# Patient Record
Sex: FEMALE | Race: WHITE | NOT HISPANIC OR LATINO | Employment: PART TIME | URBAN - NONMETROPOLITAN AREA
[De-identification: names, ages, dates, MRNs, and addresses within clinical notes are randomized per-mention and may not be internally consistent; named-entity substitution may affect disease eponyms.]

---
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
## 2020-01-02 ENCOUNTER — OFFICE VISIT (OUTPATIENT)
Dept: ENDOCRINOLOGY | Facility: CLINIC | Age: 20
End: 2020-01-02

## 2020-01-02 VITALS
HEIGHT: 68 IN | OXYGEN SATURATION: 99 % | WEIGHT: 186.4 LBS | HEART RATE: 107 BPM | BODY MASS INDEX: 28.25 KG/M2 | SYSTOLIC BLOOD PRESSURE: 120 MMHG | DIASTOLIC BLOOD PRESSURE: 78 MMHG

## 2020-01-02 DIAGNOSIS — E10.65 TYPE 1 DIABETES MELLITUS WITH HYPERGLYCEMIA (HCC): Primary | ICD-10-CM

## 2020-01-02 DIAGNOSIS — E55.9 VITAMIN D DEFICIENCY: ICD-10-CM

## 2020-01-02 PROCEDURE — 99214 OFFICE O/P EST MOD 30 MIN: CPT | Performed by: NURSE PRACTITIONER

## 2020-01-02 NOTE — PROGRESS NOTES
Subjective    Anna García is a 19 y.o. female. she is here today for follow-up.    History of Present Illness       History of Present Illness     Reason -  Diabetes      Duration/Timing:  Diabetes mellitus type 1, Age at onset of diabetes: 8 years  constant     not controlled                 Severity (Complications/Hospitalizations)  Secondary Macrovascular Complications:  No CAD, No CVA, No PAD  Secondary Microvascular Complications:  No Diabetic Nephropathy, No proteinuria, No Diabetic Retinopathy, No Diabetic Neuropathy     Context  Diabetes Regimen:  Insulin,       Lab Results   Component Value Date    HGBA1C 8.8 (H) 10/10/2019                            Blood Glucose Readings        Variable from 60 up to 300               Has had some low sugars before bed when missing super      Exercise:  Does not exercise     Associated Signs/Symptoms  Hyperglycemic Symptoms:  Positive  polyuria, No polydipsia, No polyphagia  Hypoglycemic Episodes:  + documented hypoglycemia                The following portions of the patient's history were reviewed and updated as appropriate:   Past Medical History:   Diagnosis Date   • Abdominal pain    • Acute bronchitis    • Acute pharyngitis    • Allergic rhinitis    • Asteatotic eczema    • Carbuncle of buttock    • Chalazion     - right upper and lower eyelids   • Conjunctivitis    • Constipation    • Contact dermatitis    • Diabetic ketoacidosis (CMS/HCC)     - history of   • Diarrhea    • DKA (diabetic ketoacidoses) (CMS/HCC)    • Esotropia    • Fatigue    • Folliculitis    • Hordeolum internum of right lower eyelid    • Influenza    • Laceration of skin of chin    • Nausea and vomiting    • Otitis media    • Tibial torsion      - left leg   • Type 1 diabetes mellitus (CMS/HCC)    • Vitamin D deficiency      Past Surgical History:   Procedure Laterality Date   • CRYOTHERAPY  10/13/2010    acne   • OTHER SURGICAL HISTORY  05/04/2011    Remove Impacted Cerumen 89574       Family History   Problem Relation Age of Onset   • Breast cancer Maternal Grandmother    • Hypertension Mother    • Asthma Sister    • Heart disease Maternal Grandfather    • Colon cancer Neg Hx    • Endometrial cancer Neg Hx    • Ovarian cancer Neg Hx      OB History    None       Current Outpatient Medications   Medication Sig Dispense Refill   • albuterol (PROAIR RESPICLICK) 108 (90 Base) MCG/ACT inhaler Inhale 2 puffs Every 4 (Four) Hours As Needed for Wheezing or Shortness of Air. 1 inhaler 11   • Blood Glucose/Ketone Monitor device Use as indicated 50 each 11   • clotrimazole-betamethasone (LOTRISONE) 1-0.05 % cream Apply  topically 2 (Two) Times a Day As Needed (itching). 45 g 1   • fluconazole (DIFLUCAN) 150 MG tablet Take 1 tablet by mouth today and repeat in 4 days if symptoms still present. 2 tablet 6   • GLUCAGON EMERGENCY 1 MG injection USE AS DIRECTED IN THE SETTING OF HYPOGLYCEMIA (LESS THAN 70) AND NONRESPONSIVENESS, WHEN IT IS UNSAFE TO TAKE ORAL GLUCOSE 1 each 6   • glucose blood (ACCU-CHEK SONA PLUS) test strip 200 each by Other route 6 (Six) Times a Day. Use as instructed 200 each 11   • glucose blood test strip Testing 4 times daily, DX E10.65 120 each 11   • insulin lispro (humaLOG) 100 UNIT/ML injection Up to 30 units with meals. 1 unit per 6 grams and 1 unit per 30 above 140 27 mL 5   • Insulin Pen Needle (B-D UF III MINI PEN NEEDLES) 31G X 5 MM misc USE 4-6 TIMES PER  each 10   • ondansetron ODT (ZOFRAN-ODT) 4 MG disintegrating tablet Take 1 tablet by mouth Every 8 (Eight) Hours As Needed for Nausea or Vomiting. 30 tablet 11   • TILIA FE 1-20/1-30/1-35 MG-MCG tablet TAKE 1 TABLET BY MOUTH DAILY 84 tablet 4   • TOUJEO SOLOSTAR 300 UNIT/ML solution pen-injector INJECT 40 UNITS UNDER THE SKIN INTO THE APPROPRIATE AREA AS DIRECTED AT BEDTIME 4.5 mL 11   • Urine Glucose-Ketones Test (KETO-DIASTIX) strip Test if BG greater than 250 50 each 11     No current facility-administered  "medications for this visit.      Allergies   Allergen Reactions   • Cefprozil Hives     Social History     Socioeconomic History   • Marital status:      Spouse name: Not on file   • Number of children: Not on file   • Years of education: Not on file   • Highest education level: Not on file   Tobacco Use   • Smoking status: Never Smoker   • Smokeless tobacco: Never Used   Substance and Sexual Activity   • Alcohol use: No   • Drug use: No   • Sexual activity: Never       Review of Systems  Review of Systems   Constitutional: Negative for activity change, appetite change, diaphoresis and fatigue.   HENT: Negative for facial swelling, sneezing, sore throat, tinnitus, trouble swallowing and voice change.    Eyes: Negative for photophobia, pain, discharge, redness, itching and visual disturbance.   Respiratory: Negative for apnea, cough, choking, chest tightness and shortness of breath.    Cardiovascular: Negative for chest pain, palpitations and leg swelling.   Gastrointestinal: Negative for abdominal distention, abdominal pain, constipation, diarrhea, nausea and vomiting.   Endocrine: Negative for cold intolerance, heat intolerance, polydipsia, polyphagia and polyuria.   Genitourinary: Negative for difficulty urinating, dysuria, frequency, hematuria and urgency.   Musculoskeletal: Negative for arthralgias, back pain, gait problem, joint swelling, myalgias, neck pain and neck stiffness.   Skin: Negative for color change, pallor, rash and wound.   Neurological: Negative for dizziness, tremors, weakness, light-headedness, numbness and headaches.   Hematological: Negative for adenopathy. Does not bruise/bleed easily.   Psychiatric/Behavioral: Negative for behavioral problems, confusion and sleep disturbance.        Objective    /78   Pulse 107   Ht 172.7 cm (68\")   Wt 84.6 kg (186 lb 6.4 oz)   SpO2 99%   BMI 28.34 kg/m²   Physical Exam   Constitutional: She is oriented to person, place, and time. She " appears well-developed and well-nourished. No distress.   HENT:   Head: Normocephalic and atraumatic.   Right Ear: External ear normal.   Left Ear: External ear normal.   Nose: Nose normal.   Eyes: Pupils are equal, round, and reactive to light. Conjunctivae and EOM are normal.   Neck: Normal range of motion. Neck supple. No tracheal deviation present. No thyromegaly present.   Cardiovascular: Normal rate, regular rhythm and normal heart sounds.   No murmur heard.  Pulmonary/Chest: Effort normal and breath sounds normal. No respiratory distress. She has no wheezes.   Abdominal: Soft. Bowel sounds are normal. There is no tenderness. There is no rebound and no guarding.   Musculoskeletal: Normal range of motion. She exhibits no edema, tenderness or deformity.   Neurological: She is alert and oriented to person, place, and time. No cranial nerve deficit.   Skin: Skin is warm and dry. No rash noted.   Psychiatric: She has a normal mood and affect. Her behavior is normal. Judgment and thought content normal.       Lab Review  Glucose (mg/dL)   Date Value   09/05/2019 117 (H)   06/03/2019 280 (H)   05/25/2019 310 (H)     Sodium (mmol/L)   Date Value   09/05/2019 141   06/03/2019 139   05/25/2019 137     Potassium (mmol/L)   Date Value   09/05/2019 3.8   06/03/2019 4.5   05/25/2019 4.0     Chloride (mmol/L)   Date Value   09/05/2019 98   06/03/2019 102   05/25/2019 101     CO2 (mmol/L)   Date Value   09/05/2019 27.4   06/03/2019 25.2   05/25/2019 20.0 (L)     BUN (mg/dL)   Date Value   09/05/2019 11   06/03/2019 10   05/25/2019 11     Creatinine (mg/dL)   Date Value   09/05/2019 0.63   06/03/2019 0.54 (L)   05/25/2019 0.66     Hemoglobin A1C (%)   Date Value   10/10/2019 8.8 (H)   06/03/2019 8.80 (H)   03/20/2019 11.1 (H)   01/09/2019 11.5 (H)   11/21/2018 13.0 (H)     Triglycerides (mg/dl)   Date Value   01/14/2016 41     LDL Cholesterol  (mg/dl)   Date Value   01/14/2016 99       Assessment/Plan      1. Type 1 diabetes  mellitus with hyperglycemia (CMS/[facility])    2. Vitamin D deficiency    .    Medications prescribed:  Outpatient Encounter Medications as of 1/2/2020   Medication Sig Dispense Refill   • albuterol (PROAIR RESPICLICK) 108 (90 Base) MCG/ACT inhaler Inhale 2 puffs Every 4 (Four) Hours As Needed for Wheezing or Shortness of Air. 1 inhaler 11   • Blood Glucose/Ketone Monitor device Use as indicated 50 each 11   • clotrimazole-betamethasone (LOTRISONE) 1-0.05 % cream Apply  topically 2 (Two) Times a Day As Needed (itching). 45 g 1   • fluconazole (DIFLUCAN) 150 MG tablet Take 1 tablet by mouth today and repeat in 4 days if symptoms still present. 2 tablet 6   • GLUCAGON EMERGENCY 1 MG injection USE AS DIRECTED IN THE SETTING OF HYPOGLYCEMIA (LESS THAN 70) AND NONRESPONSIVENESS, WHEN IT IS UNSAFE TO TAKE ORAL GLUCOSE 1 each 6   • glucose blood (ACCU-CHEK SONA PLUS) test strip 200 each by Other route 6 (Six) Times a Day. Use as instructed 200 each 11   • glucose blood test strip Testing 4 times daily, DX E10.65 120 each 11   • insulin lispro (humaLOG) 100 UNIT/ML injection Up to 30 units with meals. 1 unit per 6 grams and 1 unit per 30 above 140 27 mL 5   • Insulin Pen Needle (B-D UF III MINI PEN NEEDLES) 31G X 5 MM misc USE 4-6 TIMES PER  each 10   • ondansetron ODT (ZOFRAN-ODT) 4 MG disintegrating tablet Take 1 tablet by mouth Every 8 (Eight) Hours As Needed for Nausea or Vomiting. 30 tablet 11   • TILIA FE 1-20/1-30/1-35 MG-MCG tablet TAKE 1 TABLET BY MOUTH DAILY 84 tablet 4   • TOUJEO SOLOSTAR 300 UNIT/ML solution pen-injector INJECT 40 UNITS UNDER THE SKIN INTO THE APPROPRIATE AREA AS DIRECTED AT BEDTIME 4.5 mL 11   • Urine Glucose-Ketones Test (KETO-DIASTIX) strip Test if BG greater than 250 50 each 11     No facility-administered encounter medications on file as of 1/2/2020.        Orders placed during this encounter include:  Orders Placed This Encounter   Procedures   • TSH   • Comprehensive Metabolic Panel   •  Hemoglobin A1c   • Vitamin D 25 Hydroxy   • CBC & Differential     Order Specific Question:   Manual Differential     Answer:   No     Glycemic Management:     Lab Results   Component Value Date    HGBA1C 8.8 (H) 10/10/2019             Presently off pump         Toujeo taking 36 units--        Humalog  using 1 per 4 carb      Sliding scale      1 unit for every 30 above 140         Approve for  Insulin pump and or Continuous Glucose Sensor      #1  Patient has diabetes mellitus, insulin-dependent.     #2 She performs blood glucose testing at least times daily and blood glucose log was brought to office with variability from .     #3  She is requiring  Basal insulin  and Prandial Insulin for a total of at least  4 injections per day and has been doing this for at least 6 months      #4 Patient tests blood sugars at least 4 times daily and makes frequent self-adjustments and patient is injecting insulin at least 4 times daily. She has been doing this for more than 6 months . She tests frequently due to hypoglycemia and hyperglycemia.      #5 I have personally seen patient within the past 6 months     #6 We plan on seeing her every 2-3 months for continuous adjustment of her diabetes regimen      #7 patient has hypoglycemia with episodes of unawareness.     #8 patient has day-to-day variation in her mealtime which confounds the degree of insulin dosing with multiple daily injections.     #9 patient has completed diabetes education program with us.     #10 she has demonstrated the ability to self monitor her glucose.         #11 Patient is motivated in improving  diabetes control                 Lipid Management      Triglycerides   Date Value Ref Range Status   01/14/2016 41 20 - 199 mg/dl Final     Comment:     TRIG DESIRED: < 200 MG/DL     HDL Cholesterol   Date Value Ref Range Status   01/14/2016 61 60 - 200 mg/dl Final     Comment:     HDL DESIRED: > 60 MG/DL     LDL Cholesterol    Date Value Ref Range Status    01/14/2016 99 0 - 129 mg/dl Final     Comment:     Calculated LDL results only valid if Triglycerides are < 400 mg/dL.  LDL DESIRED: < 130 MG/DL  LDL DESIRED: < 130 MG/DL             Blood Pressure Management        nl w/o ace I        Microvascular Complication Monitoring:             No neuropathy      Immunizations:   Last pneumococcal immunization Jan 2016, pneumovax        Preventive Care:  Patient is not smoking     Weight Related:  Not overweight, No obesity     Bone Health     low vit D            Component      Latest Ref Rng & Units 6/3/2019   25 Hydroxy, Vitamin D      30.0 - 100.0 ng/ml 20.7 (L)      vitamin d 1000 units daily         Other Diabetes Related Aspects  Jan 2016     nl celiac            Lab Results   Component Value Date     TSH 2.230 06/03/2019            Lab Results   Component Value Date     [id number] 06/03/2019               Labs today            Tachycardia     States heart rate is always stay elevated     Denies drinking energy drinks     Will check TSH and refer to cardiology              Lab Results   Component Value Date     TSH 2.230 06/03/2019                     -------------------------------        Now on omeprazole 20 mg daily          4. Follow-up: Return for Recheck.

## 2020-02-11 DIAGNOSIS — R00.0 TACHYCARDIA: Primary | ICD-10-CM

## 2020-02-12 ENCOUNTER — OFFICE VISIT (OUTPATIENT)
Dept: CARDIOLOGY | Facility: CLINIC | Age: 20
End: 2020-02-12

## 2020-02-12 VITALS
OXYGEN SATURATION: 97 % | WEIGHT: 183.8 LBS | BODY MASS INDEX: 27.86 KG/M2 | HEIGHT: 68 IN | DIASTOLIC BLOOD PRESSURE: 70 MMHG | HEART RATE: 95 BPM | SYSTOLIC BLOOD PRESSURE: 124 MMHG

## 2020-02-12 DIAGNOSIS — R00.2 PALPITATION: ICD-10-CM

## 2020-02-12 DIAGNOSIS — E10.65 TYPE 1 DIABETES MELLITUS WITH HYPERGLYCEMIA (HCC): ICD-10-CM

## 2020-02-12 DIAGNOSIS — R00.0 TACHYCARDIA: Primary | ICD-10-CM

## 2020-02-12 PROCEDURE — 99214 OFFICE O/P EST MOD 30 MIN: CPT | Performed by: INTERNAL MEDICINE

## 2020-02-12 PROCEDURE — 93000 ELECTROCARDIOGRAM COMPLETE: CPT | Performed by: INTERNAL MEDICINE

## 2020-02-12 NOTE — PROGRESS NOTES
Anna García  19 y.o. female      1. Tachycardia    2. Type 1 diabetes mellitus with hyperglycemia (CMS/[facility])    3. 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  She is known to have type 1 diabetes mellitus with history of hospital admissions for DKA in the past.     She underwent a Holter monitor which showed the following findings:  The total QRS complexes studied were 285810.  The rhythm was sinus throughout the recording with heart rate ranging from 60 bpm to 176 bpm with an average heart rate of 97 bpm.  Several episodes of sinus tachycardia was noted.  There were no supraventricular ectopics were rare and 2 ventricular ectopics were noted.  No sustained supraventricular or ventricular arrhythmias were noted.  No significant pauses noted.  Impression: Holter recording showing essentially sinus rhythm with episodes of sinus tachycardia.  No sustained supraventricular or ventricular arrhythmias noted.    Echocardiogram showed normal left ventricular systolic function with no significant valve abnormalities.    EKG today showed sinus rhythm with heart rate of 92 bpm.  No ST-T wave changes.  No preexcitation noted.    She continues to have intermittent episodes of increased heart rate, though she has been maintaining a high fluid intake.  I did start her on low-dose metoprolol succinate on the previous visit but apparently this masked some of the symptoms of hypoglycemia and after discussion with Dr. Sexton, the medication was discontinued.    SUBJECTIVE    Allergies   Allergen Reactions   • Cefprozil Hives         Past Medical History:   Diagnosis Date   • Abdominal pain    • Acute bronchitis    • Acute pharyngitis    • Allergic rhinitis    • Asteatotic eczema    • Carbuncle of buttock    • Chalazion     - right upper and lower eyelids   • Conjunctivitis    • Constipation    • Contact dermatitis     • Diabetic ketoacidosis (CMS/HCC)     - history of   • Diarrhea    • DKA (diabetic ketoacidoses) (CMS/HCC)    • Esotropia    • Fatigue    • Folliculitis    • Hordeolum internum of right lower eyelid    • Influenza    • Laceration of skin of chin    • Nausea and vomiting    • Otitis media    • Tibial torsion      - left leg   • Type 1 diabetes mellitus (CMS/HCC)    • Vitamin D deficiency          Past Surgical History:   Procedure Laterality Date   • CRYOTHERAPY  10/13/2010    acne   • OTHER SURGICAL HISTORY  05/04/2011    Remove Impacted Cerumen 88602          Family History   Problem Relation Age of Onset   • Breast cancer Maternal Grandmother    • Hypertension Mother    • Asthma Sister    • Heart disease Maternal Grandfather    • Colon cancer Neg Hx    • Endometrial cancer Neg Hx    • Ovarian cancer Neg Hx          Social History     Socioeconomic History   • Marital status:      Spouse name: Not on file   • Number of children: Not on file   • Years of education: Not on file   • Highest education level: Not on file   Tobacco Use   • Smoking status: Never Smoker   • Smokeless tobacco: Never Used   Substance and Sexual Activity   • Alcohol use: No   • Drug use: No   • Sexual activity: Never         Current Outpatient Medications   Medication Sig Dispense Refill   • albuterol (PROAIR RESPICLICK) 108 (90 Base) MCG/ACT inhaler Inhale 2 puffs Every 4 (Four) Hours As Needed for Wheezing or Shortness of Air. 1 inhaler 11   • Blood Glucose/Ketone Monitor device Use as indicated 50 each 11   • glucose blood (ACCU-CHEK SONA PLUS) test strip 200 each by Other route 6 (Six) Times a Day. Use as instructed 200 each 11   • glucose blood test strip Testing 4 times daily, DX E10.65 120 each 11   • insulin lispro (humaLOG) 100 UNIT/ML injection Up to 30 units with meals. 1 unit per 6 grams and 1 unit per 30 above 140 27 mL 5   • ondansetron ODT (ZOFRAN-ODT) 4 MG disintegrating tablet Take 1 tablet by mouth Every 8 (Eight)  "Hours As Needed for Nausea or Vomiting. 30 tablet 11   • TILIA FE 1-20/1-30/1-35 MG-MCG tablet TAKE 1 TABLET BY MOUTH DAILY 84 tablet 4   • TOUJEO SOLOSTAR 300 UNIT/ML solution pen-injector INJECT 40 UNITS UNDER THE SKIN INTO THE APPROPRIATE AREA AS DIRECTED AT BEDTIME 4.5 mL 11   • Urine Glucose-Ketones Test (KETO-DIASTIX) strip Test if BG greater than 250 50 each 11   • clotrimazole-betamethasone (LOTRISONE) 1-0.05 % cream Apply  topically 2 (Two) Times a Day As Needed (itching). 45 g 1   • fluconazole (DIFLUCAN) 150 MG tablet Take 1 tablet by mouth today and repeat in 4 days if symptoms still present. 2 tablet 6   • GLUCAGON EMERGENCY 1 MG injection USE AS DIRECTED IN THE SETTING OF HYPOGLYCEMIA (LESS THAN 70) AND NONRESPONSIVENESS, WHEN IT IS UNSAFE TO TAKE ORAL GLUCOSE 1 each 6   • Insulin Pen Needle (B-D UF III MINI PEN NEEDLES) 31G X 5 MM misc USE 4-6 TIMES PER  each 10     No current facility-administered medications for this visit.          OBJECTIVE    /70 (BP Location: Left arm, Patient Position: Sitting, Cuff Size: Adult)   Pulse 95   Ht 172.7 cm (68\")   Wt 83.4 kg (183 lb 12.8 oz)   SpO2 97%   BMI 27.95 kg/m²         Review of Systems     Constitutional:  Denies recent weight loss, weight gain, fever or chills, no change in exercise tolerance     HENT:  Denies any hearing loss, epistaxis, hoarseness, or difficulty speaking.     Eyes: Wears eyeglasses or contact lenses     Respiratory:  Denies dyspnea with exertion,no cough, wheezing, or hemoptysis.     Cardiovascular:  palpations, no chest pain, orthopnea, PND, peripheral edema, syncope, or claudication.     Gastrointestinal:  Denies change in bowel habits, dyspepsia, ulcer disease, hematochezia, or melena.     Endocrine: Negative for cold intolerance, heat intolerance, polydipsia, polyphagia and polyuria.  Juvenile diabetes on insulin    Genitourinary: Negative.      Musculoskeletal: Denies any history of arthritic symptoms or back " problems     Skin:  Denies any change in hair or nails, rashes, or skin lesions.     Allergic/Immunologic: Negative.  Negative for environmental allergies, food allergies and immunocompromised state.     Neurological:  Denies any history of recurrent headaches, strokes, TIA, or seizure disorder.     Hematological: Denies any food allergies, seasonal allergies, bleeding disorders, or lymphadenopathy.     Psychiatric/Behavioral: Denies any history of depression, substance abuse, or change in cognitive function.         Physical Exam     Constitutional: Cooperative, alert and oriented, in no acute distress.     HENT:   Head: Normocephalic, normal hair patterns, no masses or tenderness.  Ears, Nose, and Throat: No gross abnormalities. No pallor or cyanosis.   Eyes: EOMS intact, PERRL, conjunctivae and lids unremarkable. Fundoscopic exam and visual fields not performed.   Neck: No palpable masses or adenopathy, no thyromegaly, no JVD, carotid pulses are full and equal bilaterally and without  Bruits.     Cardiovascular: Regular rhythm, S1 and S2 normal, no S3 or S4.No murmurs, gallops, or rubs detected.     Pulmonary/Chest: Chest: normal symmetry,  normal respiratory excursion, no intercostal retraction, no use of accessory muscles.            Pulmonary: Normal breath sounds. No rales or ronchi.    Abdominal: Abdomen soft, bowel sounds normoactive, no masses, no hepatosplenomegaly, non-tender, no bruits.     Musculoskeletal: No deformities, clubbing, cyanosis, erythema, or edema observed. There are no spinal abnormalities noted. Normal muscle strength and tone. Pulses full and equal in all extremities, no bruits auscultated.     Neurological: No gross motor or sensory deficits noted, affect appropriate, oriented to time, person, place.     Skin: Warm and dry to the touch, no apparent skin lesions or masses noted.     Psychiatric: She has a normal mood and affect. Her behavior is normal. Judgment and thought content  normal.         Procedures      Lab Results   Component Value Date    WBC 4.7 10/10/2019    HGB 13.2 10/10/2019    HCT 41.5 10/10/2019    MCV 89 10/10/2019     10/10/2019     Lab Results   Component Value Date    GLUCOSE 117 (H) 09/05/2019    BUN 11 09/05/2019    CREATININE 0.63 09/05/2019    EGFRIFNONA 122 09/05/2019    EGFRIFAFRI  01/09/2019      Comment:      Unable to calculate GFR, patient age <=18.    BCR 17.5 09/05/2019    CO2 27.4 09/05/2019    CALCIUM 9.8 09/05/2019    ALBUMIN 4.50 09/05/2019    AST 15 09/05/2019    ALT 12 09/05/2019     No results found for: CHOL  Lab Results   Component Value Date    TRIG 41 01/14/2016     Lab Results   Component Value Date    HDL 61 01/14/2016     No components found for: LDLCALC  Lab Results   Component Value Date    LDL 99 01/14/2016     No results found for: HDLLDLRATIO  No components found for: CHOLHDL  Lab Results   Component Value Date    HGBA1C 8.8 (H) 10/10/2019     Lab Results   Component Value Date    TSH 2.230 06/03/2019    F7VIDAC 152.0 03/20/2019    THYROIDAB 16 01/14/2016           ASSESSMENT AND PLAN  Ms. García has intermittent tachycardia which is sinus tachycardia as per the Holter monitor.  After discussing the possible etiologies in detail I have advised her to continue maintaining a high fluid intake up to 70 ounces per day.  Some degree of autonomic changes cannot be ruled out.  I have advised her to use compression stockings on a regular basis which could improve her tachycardia.  No medication has been advised at this time.    Anna was seen today for follow-up.    Diagnoses and all orders for this visit:    Tachycardia    Type 1 diabetes mellitus with hyperglycemia (CMS/[facility])    Palpitation        Patient's Body mass index is 27.95 kg/m². BMI is above normal parameters. Recommendations include: exercise counseling and nutrition counseling.  Patient is a non-smoker          Renetta Mcneil MD  2/12/2020  3:39 PM

## 2020-03-04 PROBLEM — E66.3 OVERWEIGHT (BMI 25.0-29.9): Status: ACTIVE | Noted: 2020-03-04

## 2020-03-06 ENCOUNTER — TELEPHONE (OUTPATIENT)
Dept: PULMONOLOGY | Facility: CLINIC | Age: 20
End: 2020-03-06

## 2020-03-09 ENCOUNTER — OFFICE VISIT (OUTPATIENT)
Dept: PULMONOLOGY | Facility: CLINIC | Age: 20
End: 2020-03-09

## 2020-03-09 VITALS
HEART RATE: 105 BPM | WEIGHT: 184 LBS | OXYGEN SATURATION: 98 % | DIASTOLIC BLOOD PRESSURE: 62 MMHG | HEIGHT: 68 IN | BODY MASS INDEX: 27.89 KG/M2 | SYSTOLIC BLOOD PRESSURE: 104 MMHG

## 2020-03-09 DIAGNOSIS — E66.3 OVERWEIGHT (BMI 25.0-29.9): ICD-10-CM

## 2020-03-09 DIAGNOSIS — J45.20 MILD INTERMITTENT ASTHMA WITHOUT COMPLICATION: Primary | ICD-10-CM

## 2020-03-09 PROCEDURE — 99213 OFFICE O/P EST LOW 20 MIN: CPT | Performed by: INTERNAL MEDICINE

## 2020-03-09 NOTE — PROGRESS NOTES
Pulmonary Office Follow-up     Anna García is seen in the office today for   Chief Complaint   Patient presents with   • Asthma   .    Subjective     History of Present Illness  Anna García is a 20 y.o. female with a PMH significant for asthma, type 1 diabetes mellitus, vitamin D deficiency, and GERD who presents for follow-up of asthma.     9/5/2019: She was doing better since starting Asmanex twice daily so I recommended continuing with it and advised that if she does develop persistent dyspnea while on metoprolol I would recommend changing it to a different agent that would not have the beta blocking effect.    12/5/2019: She was doing well after de-escalating from Asmanex with rare albuterol use.  I counseled her on indications for restarting Asmanex and encouraged regular comprehensive exercise.    3/9/20: She states that she continues to do well off Asmanex and has only needed her albuterol once since she saw me last.  She admits that she has not been very active but is planning to start walking regularly with the weather warming up.  Cough, sputum, chest pain, or leg swelling.  She continues to have issues with her blood sugars but is hoping to get a new insulin pump soon.  Patient was taken off her metoprolol as it was not helping with her heart rate and she has not noticed any issues with tachycardia since that time.      Review of Systems: History obtained from chart review and the patient.  Review of Systems   Constitutional: Negative for unexpected weight change.   HENT: Negative for congestion and postnasal drip.    Respiratory: Negative for cough, chest tightness, shortness of breath and wheezing.    Cardiovascular: Negative for chest pain and palpitations.     As described in the HPI. Otherwise, remainder of ROS (14 systems) were negative.    Patient Active Problem List   Diagnosis   • Vitamin D deficiency   • Type 1 diabetes mellitus with hyperglycemia (CMS/[facility])   •  Menorrhagia with regular cycle   • Recurrent vaginitis   • Tachycardia   • Palpitation   • Mild intermittent asthma without complication   • Overweight (BMI 25.0-29.9)         Current Outpatient Medications:   •  albuterol (PROAIR RESPICLICK) 108 (90 Base) MCG/ACT inhaler, Inhale 2 puffs Every 4 (Four) Hours As Needed for Wheezing or Shortness of Air., Disp: 1 inhaler, Rfl: 11  •  Blood Glucose/Ketone Monitor device, Use as indicated, Disp: 50 each, Rfl: 11  •  clotrimazole-betamethasone (LOTRISONE) 1-0.05 % cream, Apply  topically 2 (Two) Times a Day As Needed (itching)., Disp: 45 g, Rfl: 1  •  fluconazole (DIFLUCAN) 150 MG tablet, Take 1 tablet by mouth today and repeat in 4 days if symptoms still present., Disp: 2 tablet, Rfl: 6  •  GLUCAGON EMERGENCY 1 MG injection, USE AS DIRECTED IN THE SETTING OF HYPOGLYCEMIA (LESS THAN 70) AND NONRESPONSIVENESS, WHEN IT IS UNSAFE TO TAKE ORAL GLUCOSE, Disp: 1 each, Rfl: 6  •  glucose blood (ACCU-CHEK SONA PLUS) test strip, 200 each by Other route 6 (Six) Times a Day. Use as instructed, Disp: 200 each, Rfl: 11  •  glucose blood test strip, Testing 4 times daily, DX E10.65, Disp: 120 each, Rfl: 11  •  insulin lispro (humaLOG) 100 UNIT/ML injection, Up to 30 units with meals. 1 unit per 6 grams and 1 unit per 30 above 140, Disp: 27 mL, Rfl: 5  •  Insulin Pen Needle (B-D UF III MINI PEN NEEDLES) 31G X 5 MM misc, USE 4-6 TIMES PER DAY, Disp: 200 each, Rfl: 10  •  ondansetron ODT (ZOFRAN-ODT) 4 MG disintegrating tablet, Take 1 tablet by mouth Every 8 (Eight) Hours As Needed for Nausea or Vomiting., Disp: 30 tablet, Rfl: 11  •  ONE TOUCH ULTRA TEST test strip, TEST BLOOD SUGAR 6 TIMES DAILY AS DIRECTED, Disp: 150 each, Rfl: 4  •  TILIA FE 1-20/1-30/1-35 MG-MCG tablet, TAKE 1 TABLET BY MOUTH DAILY, Disp: 84 tablet, Rfl: 4  •  TOUJEO SOLOSTAR 300 UNIT/ML solution pen-injector, INJECT 40 UNITS UNDER THE SKIN INTO THE APPROPRIATE AREA AS DIRECTED AT BEDTIME, Disp: 4.5 mL, Rfl: 11  •   "Urine Glucose-Ketones Test (KETO-DIASTIX) strip, Test if BG greater than 250, Disp: 50 each, Rfl: 11    Allergies   Allergen Reactions   • Cefprozil Hives       Past Medical History:   Diagnosis Date   • Abdominal pain    • Acute bronchitis    • Acute pharyngitis    • Allergic rhinitis    • Asteatotic eczema    • Carbuncle of buttock    • Chalazion     - right upper and lower eyelids   • Conjunctivitis    • Constipation    • Contact dermatitis    • Diabetic ketoacidosis (CMS/HCC)     - history of   • Diarrhea    • DKA (diabetic ketoacidoses) (CMS/HCC)    • Esotropia    • Fatigue    • Folliculitis    • Hordeolum internum of right lower eyelid    • Influenza    • Laceration of skin of chin    • Nausea and vomiting    • Otitis media    • Tibial torsion      - left leg   • Type 1 diabetes mellitus (CMS/HCC)    • Vitamin D deficiency      Past Surgical History:   Procedure Laterality Date   • CRYOTHERAPY  10/13/2010    acne   • OTHER SURGICAL HISTORY  05/04/2011    Remove Impacted Cerumen 26413      Social History     Socioeconomic History   • Marital status:      Spouse name: Not on file   • Number of children: Not on file   • Years of education: Not on file   • Highest education level: Not on file   Tobacco Use   • Smoking status: Never Smoker   • Smokeless tobacco: Never Used   Substance and Sexual Activity   • Alcohol use: No   • Drug use: No   • Sexual activity: Never     Family History   Problem Relation Age of Onset   • Breast cancer Maternal Grandmother    • Hypertension Mother    • Asthma Sister    • Heart disease Maternal Grandfather    • Colon cancer Neg Hx    • Endometrial cancer Neg Hx    • Ovarian cancer Neg Hx           Objective     Blood pressure 104/62, pulse 105, height 172.7 cm (68\"), weight 83.5 kg (184 lb), SpO2 98 %, not currently breastfeeding.  Physical Exam   Constitutional: She is oriented to person, place, and time. Vital signs are normal. She appears well-developed and well-nourished. "   HENT:   Head: Normocephalic and atraumatic.   Nose: Nose normal.   Mouth/Throat: Uvula is midline, oropharynx is clear and moist and mucous membranes are normal. Tonsils are 1+ on the right. Tonsils are 1+ on the left. No tonsillar exudate.   Mallampati 1   Eyes: Pupils are equal, round, and reactive to light. Conjunctivae, EOM and lids are normal.   Neck: Trachea normal and normal range of motion. No tracheal tenderness present. No thyroid mass present.   Cardiovascular: Normal rate, regular rhythm and normal heart sounds. PMI is not displaced. Exam reveals no gallop.   No murmur heard.  Pulmonary/Chest: Effort normal and breath sounds normal. No respiratory distress. She has no decreased breath sounds. She has no wheezes. She has no rhonchi. She exhibits no tenderness.   Abdominal: Soft. Normal appearance and bowel sounds are normal. There is no hepatomegaly. There is no tenderness.   Musculoskeletal:   Normal gait, no extremity edema   Lymphadenopathy:        Head (right side): No submandibular adenopathy present.        Head (left side): No submandibular adenopathy present.     She has no cervical adenopathy.        Right: No supraclavicular adenopathy present.        Left: No supraclavicular adenopathy present.   Neurological: She is alert and oriented to person, place, and time.   Skin: Skin is warm and dry. No rash noted. No cyanosis. Nails show no clubbing.   Psychiatric: She has a normal mood and affect. Her speech is normal and behavior is normal. Judgment normal.   Nursing note and vitals reviewed.      PFTs: 8/5/19 (independently reviewed and interpreted by me)  Ratio 46  FVC 3.41/ 79%  FEV1 1.55/ 41%  TLC 4.28/ 75%  DLCO 32.21/ 108%  Severe obstruction with no significant bronchodilator response.  Borderline TLC.  Normal diffusing capacity.  No comparative data available.    Radiology (independently reviewed and interpreted by me): CXR 8/5/19 showed NACPD         Assessment/Plan     Anna was seen  today for asthma.    Diagnoses and all orders for this visit:    Mild intermittent asthma without complication    Overweight (BMI 25.0-29.9)         Discussion/ Recommendations:   She continues to do well with rare albuterol use I think is reasonable to monitor.  I did advise her to try using her albuterol prior to exercise if she noticed that dyspnea is an issue.  If she does else increased dyspnea or albuterol use, I would recommend starting back on ICS.    -Continue off Asmanex.  If dyspnea and albuterol use increases, will need to discuss restarting an ICS.  -Use ProAir Respiclick 2 puffs every 4 hours as needed for dyspnea or wheeze.  Okay to use prior to exercise if necessary.  -Counseled on allergen and trigger avoidance.  -Encouraged her to start regular, progressive exercise.  -Encouraged her to get annual flu vaccine soon.    Patient's Body mass index is 27.98 kg/m². BMI is within normal parameters. No follow-up required..           Return in about 6 months (around 9/9/2020) for Recheck asthma.      Thank you for allowing me to participate in the care of Anna García. Please do not hesitate to contact me with any questions.         This document has been electronically signed by Jocelin Vergara MD on March 9, 2020 2:30 PM      Dictated using Dragon

## 2020-03-21 ENCOUNTER — NURSE TRIAGE (OUTPATIENT)
Dept: CALL CENTER | Facility: HOSPITAL | Age: 20
End: 2020-03-21

## 2020-03-21 ENCOUNTER — HOSPITAL ENCOUNTER (EMERGENCY)
Facility: HOSPITAL | Age: 20
Discharge: HOME | End: 2020-03-21
Attending: FAMILY MEDICINE | Admitting: FAMILY MEDICINE

## 2020-03-21 VITALS
HEART RATE: 98 BPM | RESPIRATION RATE: 16 BRPM | HEIGHT: 68 IN | SYSTOLIC BLOOD PRESSURE: 109 MMHG | WEIGHT: 184.9 LBS | DIASTOLIC BLOOD PRESSURE: 71 MMHG | TEMPERATURE: 98.4 F | BODY MASS INDEX: 28.02 KG/M2 | OXYGEN SATURATION: 99 %

## 2020-03-21 DIAGNOSIS — R73.9 HYPERGLYCEMIA: Primary | ICD-10-CM

## 2020-03-21 LAB
ACETONE BLD QL: NEGATIVE
ALBUMIN SERPL-MCNC: 4.1 G/DL (ref 3.5–5.2)
ALBUMIN/GLOB SERPL: 1.1 G/DL
ALP SERPL-CCNC: 79 U/L (ref 39–117)
ALT SERPL W P-5'-P-CCNC: 9 U/L (ref 1–33)
ANION GAP SERPL CALCULATED.3IONS-SCNC: 17 MMOL/L (ref 5–15)
AST SERPL-CCNC: 15 U/L (ref 1–32)
BASOPHILS # BLD AUTO: 0.04 10*3/MM3 (ref 0–0.2)
BASOPHILS NFR BLD AUTO: 0.7 % (ref 0–1.5)
BILIRUB SERPL-MCNC: 0.2 MG/DL (ref 0.2–1.2)
BILIRUB UR QL STRIP: NEGATIVE
BUN BLD-MCNC: 11 MG/DL (ref 6–20)
BUN/CREAT SERPL: 13.4 (ref 7–25)
CALCIUM SPEC-SCNC: 9.4 MG/DL (ref 8.6–10.5)
CHLORIDE SERPL-SCNC: 97 MMOL/L (ref 98–107)
CLARITY UR: CLEAR
CO2 SERPL-SCNC: 23 MMOL/L (ref 22–29)
COLOR UR: ABNORMAL
CREAT BLD-MCNC: 0.82 MG/DL (ref 0.57–1)
DEPRECATED RDW RBC AUTO: 39.5 FL (ref 37–54)
EOSINOPHIL # BLD AUTO: 0.03 10*3/MM3 (ref 0–0.4)
EOSINOPHIL NFR BLD AUTO: 0.5 % (ref 0.3–6.2)
ERYTHROCYTE [DISTWIDTH] IN BLOOD BY AUTOMATED COUNT: 12.4 % (ref 12.3–15.4)
GFR SERPL CREATININE-BSD FRML MDRD: 89 ML/MIN/1.73
GLOBULIN UR ELPH-MCNC: 3.7 GM/DL
GLUCOSE BLD-MCNC: 379 MG/DL (ref 65–99)
GLUCOSE BLDC GLUCOMTR-MCNC: 246 MG/DL (ref 70–130)
GLUCOSE UR STRIP-MCNC: ABNORMAL MG/DL
HCT VFR BLD AUTO: 41.5 % (ref 34–46.6)
HGB BLD-MCNC: 14.1 G/DL (ref 12–15.9)
HGB UR QL STRIP.AUTO: NEGATIVE
HOLD SPECIMEN: NORMAL
IMM GRANULOCYTES # BLD AUTO: 0.02 10*3/MM3 (ref 0–0.05)
IMM GRANULOCYTES NFR BLD AUTO: 0.3 % (ref 0–0.5)
KETONES UR QL STRIP: ABNORMAL
LEUKOCYTE ESTERASE UR QL STRIP.AUTO: NEGATIVE
LIPASE SERPL-CCNC: 14 U/L (ref 13–60)
LYMPHOCYTES # BLD AUTO: 1.84 10*3/MM3 (ref 0.7–3.1)
LYMPHOCYTES NFR BLD AUTO: 30.9 % (ref 19.6–45.3)
MCH RBC QN AUTO: 29.6 PG (ref 26.6–33)
MCHC RBC AUTO-ENTMCNC: 34 G/DL (ref 31.5–35.7)
MCV RBC AUTO: 87 FL (ref 79–97)
MONOCYTES # BLD AUTO: 0.51 10*3/MM3 (ref 0.1–0.9)
MONOCYTES NFR BLD AUTO: 8.6 % (ref 5–12)
NEUTROPHILS # BLD AUTO: 3.51 10*3/MM3 (ref 1.7–7)
NEUTROPHILS NFR BLD AUTO: 59 % (ref 42.7–76)
NITRITE UR QL STRIP: NEGATIVE
NRBC BLD AUTO-RTO: 0 /100 WBC (ref 0–0.2)
PH UR STRIP.AUTO: 6 [PH] (ref 5–9)
PLATELET # BLD AUTO: 368 10*3/MM3 (ref 140–450)
PMV BLD AUTO: 9.9 FL (ref 6–12)
POTASSIUM BLD-SCNC: 4 MMOL/L (ref 3.5–5.2)
PROT SERPL-MCNC: 7.8 G/DL (ref 6–8.5)
PROT UR QL STRIP: NEGATIVE
RBC # BLD AUTO: 4.77 10*6/MM3 (ref 3.77–5.28)
SODIUM BLD-SCNC: 137 MMOL/L (ref 136–145)
SP GR UR STRIP: 1.04 (ref 1–1.03)
UROBILINOGEN UR QL STRIP: ABNORMAL
WBC NRBC COR # BLD: 5.95 10*3/MM3 (ref 3.4–10.8)
WHOLE BLOOD HOLD SPECIMEN: NORMAL
WHOLE BLOOD HOLD SPECIMEN: NORMAL

## 2020-03-21 PROCEDURE — 99283 EMERGENCY DEPT VISIT LOW MDM: CPT

## 2020-03-21 PROCEDURE — 82009 KETONE BODYS QUAL: CPT | Performed by: FAMILY MEDICINE

## 2020-03-21 PROCEDURE — 63710000001 INSULIN REGULAR HUMAN PER 5 UNITS: Performed by: FAMILY MEDICINE

## 2020-03-21 PROCEDURE — 85025 COMPLETE CBC W/AUTO DIFF WBC: CPT | Performed by: FAMILY MEDICINE

## 2020-03-21 PROCEDURE — 96374 THER/PROPH/DIAG INJ IV PUSH: CPT

## 2020-03-21 PROCEDURE — 96361 HYDRATE IV INFUSION ADD-ON: CPT

## 2020-03-21 PROCEDURE — 25010000002 ONDANSETRON PER 1 MG: Performed by: FAMILY MEDICINE

## 2020-03-21 PROCEDURE — 82962 GLUCOSE BLOOD TEST: CPT

## 2020-03-21 PROCEDURE — 80053 COMPREHEN METABOLIC PANEL: CPT | Performed by: FAMILY MEDICINE

## 2020-03-21 PROCEDURE — 81003 URINALYSIS AUTO W/O SCOPE: CPT | Performed by: FAMILY MEDICINE

## 2020-03-21 PROCEDURE — 83690 ASSAY OF LIPASE: CPT | Performed by: FAMILY MEDICINE

## 2020-03-21 RX ORDER — ONDANSETRON 2 MG/ML
4 INJECTION INTRAMUSCULAR; INTRAVENOUS ONCE
Status: COMPLETED | OUTPATIENT
Start: 2020-03-21 | End: 2020-03-21

## 2020-03-21 RX ORDER — SODIUM CHLORIDE 0.9 % (FLUSH) 0.9 %
10 SYRINGE (ML) INJECTION AS NEEDED
Status: DISCONTINUED | OUTPATIENT
Start: 2020-03-21 | End: 2020-03-22 | Stop reason: HOSPADM

## 2020-03-21 RX ADMIN — ONDANSETRON 4 MG: 2 INJECTION INTRAMUSCULAR; INTRAVENOUS at 21:12

## 2020-03-21 RX ADMIN — HUMAN INSULIN 16.8 UNITS: 100 INJECTION, SOLUTION SUBCUTANEOUS at 21:15

## 2020-03-21 RX ADMIN — SODIUM CHLORIDE 1000 ML: 900 INJECTION, SOLUTION INTRAVENOUS at 19:45

## 2020-03-21 NOTE — TELEPHONE ENCOUNTER
"She is type 1 diabetic has blood sugar of 440 after insulin, some nausea, her keytones are 2.2, nausea fever 100.5 sent to be seen.     Reason for Disposition  • [1] Blood glucose > 240 mg/dL (13.3 mmol/L) AND [2] blood ketones > 1.4 mmol/L    Additional Information  • Negative: Unconscious or difficult to awaken  • Negative: Acting confused (e.g., disoriented, slurred speech)  • Negative: Very weak (e.g., can't stand)  • Negative: Sounds like a life-threatening emergency to the triager  • Negative: [1] Vomiting AND [2] signs of dehydration (e.g., very dry mouth, lightheaded, dark urine)  • Negative: [1] Blood glucose > 240 mg/dL (13.3 mmol/L) AND [2] rapid breathing  • Negative: Blood glucose > 500 mg/dL (27.8 mmol/L)  • Negative: [1] Blood glucose > 240 mg/dL (13.3 mmol/L) AND [2] urine ketones moderate-large (or more than 1+)  • Negative: [1] Blood glucose > 240 mg/dL (13.3 mmol/L) AND [2] vomiting AND [3] unable to check for ketones (in blood or urine)    Answer Assessment - Initial Assessment Questions  1. BLOOD GLUCOSE: \"What is your blood glucose level?\"       440 last blood sugar was 498  2. ONSET: \"When did you check the blood glucose?\"      Just now  3. USUAL RANGE: \"What is your glucose level usually?\" (e.g., usual fasting morning value, usual evening value)      Usually 250 -270 in mornings  4. KETONES: \"Do you check for ketones (urine or blood test strips)?\" If yes, ask: \"What does the test show now?\"       keytones 2.2  5. TYPE 1 or 2:  \"Do you know what type of diabetes you have?\"  (e.g., Type 1, Type 2, Gestational; doesn't know)       Type 1  6. INSULIN: \"Do you take insulin?\" \"What type of insulin(s) do you use? What is the mode of delivery? (syringe, pen (e.g., injection or  pump)?\"       Takes humalog and toujeo  7. DIABETES PILLS: \"Do you take any pills for your diabetes?\" If yes, ask: \"Have you missed taking any pills recently?\"      Takes insulin only  8. OTHER SYMPTOMS: \"Do you have any " "symptoms?\" (e.g., fever, frequent urination, difficulty breathing, dizziness, weakness, vomiting)      Fever,freq. Urination,nausea  9. PREGNANCY: \"Is there any chance you are pregnant?\" \"When was your last menstrual period?\"      No, last period week ago    Protocols used: DIABETES - HIGH BLOOD SUGAR-ADULT-AH      "

## 2020-04-16 RX ORDER — BLOOD KETONE TEST, STRIPS
STRIP MISCELLANEOUS
Qty: 60 EACH | Refills: 4 | Status: SHIPPED | OUTPATIENT
Start: 2020-04-16 | End: 2022-07-15

## 2020-06-17 ENCOUNTER — OFFICE VISIT (OUTPATIENT)
Dept: ENDOCRINOLOGY | Facility: CLINIC | Age: 20
End: 2020-06-17

## 2020-06-17 ENCOUNTER — LAB (OUTPATIENT)
Dept: LAB | Facility: HOSPITAL | Age: 20
End: 2020-06-17

## 2020-06-17 VITALS
WEIGHT: 189 LBS | HEART RATE: 97 BPM | DIASTOLIC BLOOD PRESSURE: 70 MMHG | BODY MASS INDEX: 28.64 KG/M2 | OXYGEN SATURATION: 98 % | SYSTOLIC BLOOD PRESSURE: 125 MMHG | HEIGHT: 68 IN

## 2020-06-17 DIAGNOSIS — E10.65 TYPE 1 DIABETES MELLITUS WITH HYPERGLYCEMIA (HCC): Primary | ICD-10-CM

## 2020-06-17 DIAGNOSIS — E55.9 VITAMIN D DEFICIENCY: ICD-10-CM

## 2020-06-17 LAB
25(OH)D3 SERPL-MCNC: 19.8 NG/ML (ref 30–100)
ALBUMIN SERPL-MCNC: 3.9 G/DL (ref 3.5–5.2)
ALBUMIN/GLOB SERPL: 1.1 G/DL
ALP SERPL-CCNC: 81 U/L (ref 39–117)
ALT SERPL W P-5'-P-CCNC: 15 U/L (ref 1–33)
ANION GAP SERPL CALCULATED.3IONS-SCNC: 12 MMOL/L (ref 5–15)
AST SERPL-CCNC: 15 U/L (ref 1–32)
BASOPHILS # BLD AUTO: 0.05 10*3/MM3 (ref 0–0.2)
BASOPHILS NFR BLD AUTO: 0.8 % (ref 0–1.5)
BILIRUB SERPL-MCNC: <0.2 MG/DL (ref 0.2–1.2)
BUN BLD-MCNC: 9 MG/DL (ref 6–20)
BUN/CREAT SERPL: 17 (ref 7–25)
CALCIUM SPEC-SCNC: 9.2 MG/DL (ref 8.6–10.5)
CHLORIDE SERPL-SCNC: 101 MMOL/L (ref 98–107)
CO2 SERPL-SCNC: 24 MMOL/L (ref 22–29)
CREAT BLD-MCNC: 0.53 MG/DL (ref 0.57–1)
DEPRECATED RDW RBC AUTO: 43.2 FL (ref 37–54)
EOSINOPHIL # BLD AUTO: 0.06 10*3/MM3 (ref 0–0.4)
EOSINOPHIL NFR BLD AUTO: 1 % (ref 0.3–6.2)
ERYTHROCYTE [DISTWIDTH] IN BLOOD BY AUTOMATED COUNT: 13.2 % (ref 12.3–15.4)
GFR SERPL CREATININE-BSD FRML MDRD: 147 ML/MIN/1.73
GLOBULIN UR ELPH-MCNC: 3.4 GM/DL
GLUCOSE BLD-MCNC: 166 MG/DL (ref 65–99)
HCT VFR BLD AUTO: 40.6 % (ref 34–46.6)
HGB BLD-MCNC: 13.3 G/DL (ref 12–15.9)
IMM GRANULOCYTES # BLD AUTO: 0.01 10*3/MM3 (ref 0–0.05)
IMM GRANULOCYTES NFR BLD AUTO: 0.2 % (ref 0–0.5)
LYMPHOCYTES # BLD AUTO: 2.74 10*3/MM3 (ref 0.7–3.1)
LYMPHOCYTES NFR BLD AUTO: 45.4 % (ref 19.6–45.3)
MCH RBC QN AUTO: 29.2 PG (ref 26.6–33)
MCHC RBC AUTO-ENTMCNC: 32.8 G/DL (ref 31.5–35.7)
MCV RBC AUTO: 89 FL (ref 79–97)
MONOCYTES # BLD AUTO: 0.54 10*3/MM3 (ref 0.1–0.9)
MONOCYTES NFR BLD AUTO: 9 % (ref 5–12)
NEUTROPHILS # BLD AUTO: 2.63 10*3/MM3 (ref 1.7–7)
NEUTROPHILS NFR BLD AUTO: 43.6 % (ref 42.7–76)
NRBC BLD AUTO-RTO: 0 /100 WBC (ref 0–0.2)
PLATELET # BLD AUTO: 385 10*3/MM3 (ref 140–450)
PMV BLD AUTO: 10.5 FL (ref 6–12)
POTASSIUM BLD-SCNC: 3.7 MMOL/L (ref 3.5–5.2)
PROT SERPL-MCNC: 7.3 G/DL (ref 6–8.5)
RBC # BLD AUTO: 4.56 10*6/MM3 (ref 3.77–5.28)
SODIUM BLD-SCNC: 137 MMOL/L (ref 136–145)
TSH SERPL DL<=0.05 MIU/L-ACNC: 5.54 UIU/ML (ref 0.27–4.2)
WBC NRBC COR # BLD: 6.03 10*3/MM3 (ref 3.4–10.8)

## 2020-06-17 PROCEDURE — 85025 COMPLETE CBC W/AUTO DIFF WBC: CPT | Performed by: NURSE PRACTITIONER

## 2020-06-17 PROCEDURE — 95251 CONT GLUC MNTR ANALYSIS I&R: CPT | Performed by: NURSE PRACTITIONER

## 2020-06-17 PROCEDURE — 84443 ASSAY THYROID STIM HORMONE: CPT | Performed by: NURSE PRACTITIONER

## 2020-06-17 PROCEDURE — 99214 OFFICE O/P EST MOD 30 MIN: CPT | Performed by: NURSE PRACTITIONER

## 2020-06-17 PROCEDURE — 82306 VITAMIN D 25 HYDROXY: CPT | Performed by: NURSE PRACTITIONER

## 2020-06-17 PROCEDURE — 80053 COMPREHEN METABOLIC PANEL: CPT | Performed by: NURSE PRACTITIONER

## 2020-06-17 PROCEDURE — 83036 HEMOGLOBIN GLYCOSYLATED A1C: CPT | Performed by: NURSE PRACTITIONER

## 2020-06-17 PROCEDURE — 36415 COLL VENOUS BLD VENIPUNCTURE: CPT | Performed by: NURSE PRACTITIONER

## 2020-06-17 RX ORDER — URINE GLUCOSE-ACET TEST STRIP
STRIP MISCELLANEOUS
Qty: 50 EACH | Refills: 11 | Status: SHIPPED | OUTPATIENT
Start: 2020-06-17 | End: 2021-04-22 | Stop reason: SDUPTHER

## 2020-06-17 NOTE — PROGRESS NOTES
Subjective    Anna García is a 20 y.o. female. she is here today for follow-up.    History of Present Illness     IN OFFICE VISIT       History of Present Illness      Reason -  Diabetes        Duration/Timing:  Diabetes mellitus type 1, Age at onset of diabetes: 8 years    Timing - constant      Quality -  not controlled               Severity (Complications/Hospitalizations)    Secondary Macrovascular Complications:  No CAD, No CVA, No PAD  Secondary Microvascular Complications:  No Diabetic Nephropathy, No proteinuria, No Diabetic Retinopathy, No Diabetic Neuropathy     Context  Diabetes Regimen:  Insulin,             Lab Results   Component Value Date     HGBA1C 8.8 (H) 10/10/2019                              Blood Glucose Readings                Dexcom g6        Dexcom G6     May 19 - June 17, 2020      Average BG -- 310     93 % high     7% in range              Exercise:  Does not exercise     Associated Signs/Symptoms  Hyperglycemic Symptoms:  Positive  polyuria, No polydipsia, No polyphagia  Hypoglycemic Episodes:  + documented hypoglycemia            The following portions of the patient's history were reviewed and updated as appropriate:   Past Medical History:   Diagnosis Date   • Abdominal pain    • Acute bronchitis    • Acute pharyngitis    • Allergic rhinitis    • Asteatotic eczema    • Carbuncle of buttock    • Chalazion     - right upper and lower eyelids   • Conjunctivitis    • Constipation    • Contact dermatitis    • Diabetic ketoacidosis (CMS/HCC)     - history of   • Diarrhea    • DKA (diabetic ketoacidoses) (CMS/HCC)    • Esotropia    • Fatigue    • Folliculitis    • Hordeolum internum of right lower eyelid    • Influenza    • Laceration of skin of chin    • Nausea and vomiting    • Otitis media    • Tibial torsion      - left leg   • Type 1 diabetes mellitus (CMS/HCC)    • Vitamin D deficiency      Past Surgical History:   Procedure Laterality Date   • CRYOTHERAPY  10/13/2010     acne   • OTHER SURGICAL HISTORY  05/04/2011    Remove Impacted Cerumen 26256      Family History   Problem Relation Age of Onset   • Breast cancer Maternal Grandmother    • Hypertension Mother    • Asthma Sister    • Heart disease Maternal Grandfather    • Colon cancer Neg Hx    • Endometrial cancer Neg Hx    • Ovarian cancer Neg Hx      OB History    None       Current Outpatient Medications   Medication Sig Dispense Refill   • albuterol (PROAIR RESPICLICK) 108 (90 Base) MCG/ACT inhaler Inhale 2 puffs Every 4 (Four) Hours As Needed for Wheezing or Shortness of Air. 1 inhaler 11   • Blood Glucose/Ketone Monitor device Use as indicated 50 each 11   • clotrimazole-betamethasone (LOTRISONE) 1-0.05 % cream Apply  topically 2 (Two) Times a Day As Needed (itching). 45 g 1   • fluconazole (DIFLUCAN) 150 MG tablet Take 1 tablet by mouth today and repeat in 4 days if symptoms still present. 2 tablet 6   • GLUCAGON EMERGENCY 1 MG injection USE AS DIRECTED IN THE SETTING OF HYPOGLYCEMIA (LESS THAN 70) AND NONRESPONSIVENESS, WHEN IT IS UNSAFE TO TAKE ORAL GLUCOSE 1 each 6   • glucose blood (ACCU-CHEK SONA PLUS) test strip 200 each by Other route 6 (Six) Times a Day. Use as instructed 200 each 11   • glucose blood test strip Testing 4 times daily, DX E10.65 120 each 11   • Insulin Glargine, 1 Unit Dial, (Toujeo SoloStar) 300 UNIT/ML solution pen-injector injection Inject 45 Units under the skin into the appropriate area as directed Daily. 4.5 mL 11   • insulin lispro (humaLOG) 100 UNIT/ML injection Up to 30 units with meals. 1 unit per 6 grams and 1 unit per 30 above 140 27 mL 5   • Insulin Pen Needle (B-D UF III MINI PEN NEEDLES) 31G X 5 MM misc USE 4-6 TIMES PER  each 10   • ondansetron ODT (ZOFRAN-ODT) 4 MG disintegrating tablet Take 1 tablet by mouth Every 8 (Eight) Hours As Needed for Nausea or Vomiting. 30 tablet 11   • ONE TOUCH ULTRA TEST test strip TEST BLOOD SUGAR 6 TIMES DAILY AS DIRECTED 150 each 4   •  PRECISION XTRA strip CHECK AS NEEDED WHEN BLOOD SUGAR ABOVE 250 60 each 4   • sertraline (ZOLOFT) 50 MG tablet Take 50 mg by mouth Daily.     • TILIA FE 1-20/1-30/1-35 MG-MCG tablet TAKE 1 TABLET BY MOUTH DAILY 84 tablet 4   • Urine Glucose-Ketones Test (KETO-DIASTIX) strip Test if BG greater than 250 50 each 11   • Glucagon (Gvoke HypoPen 1-Pack) 1 MG/0.2ML solution auto-injector Inject 1 mg under the skin into the appropriate area as directed As Needed (hypoglycemia). 2 mL 11     No current facility-administered medications for this visit.      Allergies   Allergen Reactions   • Cefprozil Hives     Social History     Socioeconomic History   • Marital status:      Spouse name: Not on file   • Number of children: Not on file   • Years of education: Not on file   • Highest education level: Not on file   Tobacco Use   • Smoking status: Never Smoker   • Smokeless tobacco: Never Used   Substance and Sexual Activity   • Alcohol use: No   • Drug use: No   • Sexual activity: Never       Review of Systems  Review of Systems   Constitutional: Negative for activity change, appetite change, diaphoresis and fatigue.   HENT: Negative for facial swelling, sneezing, sore throat, tinnitus, trouble swallowing and voice change.    Eyes: Negative for photophobia, pain, discharge, redness, itching and visual disturbance.   Respiratory: Negative for apnea, cough, choking, chest tightness and shortness of breath.    Cardiovascular: Negative for chest pain, palpitations and leg swelling.   Gastrointestinal: Negative for abdominal distention, abdominal pain, constipation, diarrhea, nausea and vomiting.   Endocrine: Negative for cold intolerance, heat intolerance, polydipsia, polyphagia and polyuria.   Genitourinary: Negative for difficulty urinating, dysuria, frequency, hematuria and urgency.   Musculoskeletal: Negative for arthralgias, back pain, gait problem, joint swelling, myalgias, neck pain and neck stiffness.   Skin: Negative  "for color change, pallor, rash and wound.   Neurological: Negative for dizziness, tremors, weakness, light-headedness, numbness and headaches.   Hematological: Negative for adenopathy. Does not bruise/bleed easily.   Psychiatric/Behavioral: Negative for behavioral problems, confusion and sleep disturbance.        Objective    /70 (BP Location: Right arm)   Pulse 97   Ht 172.7 cm (68\")   Wt 85.7 kg (189 lb)   SpO2 98%   BMI 28.74 kg/m²   Physical Exam   Constitutional: She is oriented to person, place, and time. She appears well-developed and well-nourished. No distress.   HENT:   Head: Normocephalic and atraumatic.   Right Ear: External ear normal.   Left Ear: External ear normal.   Nose: Nose normal.   Eyes: Pupils are equal, round, and reactive to light. Conjunctivae and EOM are normal.   Neck: Normal range of motion. Neck supple. No tracheal deviation present. No thyromegaly present.   Cardiovascular: Normal rate, regular rhythm and normal heart sounds.   No murmur heard.  Pulmonary/Chest: Effort normal and breath sounds normal. No respiratory distress. She has no wheezes.   Abdominal: Soft. Bowel sounds are normal. There is no tenderness. There is no rebound and no guarding.   Musculoskeletal: Normal range of motion. She exhibits no edema, tenderness or deformity.   Neurological: She is alert and oriented to person, place, and time. No cranial nerve deficit.   Skin: Skin is warm and dry. No rash noted.   Psychiatric: She has a normal mood and affect. Her behavior is normal. Judgment and thought content normal.       Lab Review  Glucose (mg/dL)   Date Value   03/21/2020 379 (H)   09/05/2019 117 (H)   06/03/2019 280 (H)     Sodium (mmol/L)   Date Value   03/21/2020 137   09/05/2019 141   06/03/2019 139     Potassium (mmol/L)   Date Value   03/21/2020 4.0   09/05/2019 3.8   06/03/2019 4.5     Chloride (mmol/L)   Date Value   03/21/2020 97 (L)   09/05/2019 98   06/03/2019 102     CO2 (mmol/L)   Date Value "   03/21/2020 23.0   09/05/2019 27.4   06/03/2019 25.2     BUN (mg/dL)   Date Value   03/21/2020 11   09/05/2019 11   06/03/2019 10     Creatinine (mg/dL)   Date Value   03/21/2020 0.82   09/05/2019 0.63   06/03/2019 0.54 (L)     Hemoglobin A1C (%)   Date Value   10/10/2019 8.8 (H)   06/03/2019 8.80 (H)   03/20/2019 11.1 (H)   01/09/2019 11.5 (H)   11/21/2018 13.0 (H)     Triglycerides (mg/dl)   Date Value   01/14/2016 41     LDL Cholesterol  (mg/dl)   Date Value   01/14/2016 99       Assessment/Plan      1. Type 1 diabetes mellitus with hyperglycemia (CMS/[facility])    2. Vitamin D deficiency    .    Medications prescribed:  Outpatient Encounter Medications as of 6/17/2020   Medication Sig Dispense Refill   • albuterol (PROAIR RESPICLICK) 108 (90 Base) MCG/ACT inhaler Inhale 2 puffs Every 4 (Four) Hours As Needed for Wheezing or Shortness of Air. 1 inhaler 11   • Blood Glucose/Ketone Monitor device Use as indicated 50 each 11   • clotrimazole-betamethasone (LOTRISONE) 1-0.05 % cream Apply  topically 2 (Two) Times a Day As Needed (itching). 45 g 1   • fluconazole (DIFLUCAN) 150 MG tablet Take 1 tablet by mouth today and repeat in 4 days if symptoms still present. 2 tablet 6   • GLUCAGON EMERGENCY 1 MG injection USE AS DIRECTED IN THE SETTING OF HYPOGLYCEMIA (LESS THAN 70) AND NONRESPONSIVENESS, WHEN IT IS UNSAFE TO TAKE ORAL GLUCOSE 1 each 6   • glucose blood (ACCU-CHEK SONA PLUS) test strip 200 each by Other route 6 (Six) Times a Day. Use as instructed 200 each 11   • glucose blood test strip Testing 4 times daily, DX E10.65 120 each 11   • Insulin Glargine, 1 Unit Dial, (Toujeo SoloStar) 300 UNIT/ML solution pen-injector injection Inject 45 Units under the skin into the appropriate area as directed Daily. 4.5 mL 11   • insulin lispro (humaLOG) 100 UNIT/ML injection Up to 30 units with meals. 1 unit per 6 grams and 1 unit per 30 above 140 27 mL 5   • Insulin Pen Needle (B-D UF III MINI PEN NEEDLES) 31G X 5 MM misc USE  4-6 TIMES PER  each 10   • ondansetron ODT (ZOFRAN-ODT) 4 MG disintegrating tablet Take 1 tablet by mouth Every 8 (Eight) Hours As Needed for Nausea or Vomiting. 30 tablet 11   • ONE TOUCH ULTRA TEST test strip TEST BLOOD SUGAR 6 TIMES DAILY AS DIRECTED 150 each 4   • PRECISION XTRA strip CHECK AS NEEDED WHEN BLOOD SUGAR ABOVE 250 60 each 4   • sertraline (ZOLOFT) 50 MG tablet Take 50 mg by mouth Daily.     • TILIA FE 1-20/1-30/1-35 MG-MCG tablet TAKE 1 TABLET BY MOUTH DAILY 84 tablet 4   • Urine Glucose-Ketones Test (KETO-DIASTIX) strip Test if BG greater than 250 50 each 11   • [DISCONTINUED] insulin lispro (humaLOG) 100 UNIT/ML injection Up to 30 units with meals. 1 unit per 6 grams and 1 unit per 30 above 140 27 mL 5   • [DISCONTINUED] Insulin Pen Needle (B-D UF III MINI PEN NEEDLES) 31G X 5 MM misc USE 4-6 TIMES PER  each 10   • [DISCONTINUED] TOUJEO SOLOSTAR 300 UNIT/ML solution pen-injector INJECT 40 UNITS UNDER THE SKIN INTO THE APPROPRIATE AREA AS DIRECTED AT BEDTIME 4.5 mL 11   • [DISCONTINUED] Urine Glucose-Ketones Test (KETO-DIASTIX) strip Test if BG greater than 250 50 each 11   • Glucagon (Gvoke HypoPen 1-Pack) 1 MG/0.2ML solution auto-injector Inject 1 mg under the skin into the appropriate area as directed As Needed (hypoglycemia). 2 mL 11     No facility-administered encounter medications on file as of 6/17/2020.        Orders placed during this encounter include:  No orders of the defined types were placed in this encounter.    Glycemic Management:            Lab Results   Component Value Date     HGBA1C 8.8 (H) 10/10/2019                 Toujeo taking 36 units--increase to 38        Humalog  using 1 per 4 carb ---        +       Sliding scale      1 unit for every 30 above 140        Dexcom G6     May 19 - June 17, 2020      Average BG -- 310     93 % high     7% in range     Follow up iin 3 days          Approve for  Insulin pump and or Continuous Glucose Sensor      #1  Patient has  diabetes mellitus, insulin-dependent.     #2 She performs blood glucose testing at least times daily and blood glucose log was brought to office with variability from .     #3  She is requiring  Basal insulin  and Prandial Insulin for a total of at least  4 injections per day and has been doing this for at least 6 months      #4 Patient tests blood sugars at least 4 times daily and makes frequent self-adjustments and patient is injecting insulin at least 4 times daily. She has been doing this for more than 6 months . She tests frequently due to hypoglycemia and hyperglycemia.      #5 I have personally seen patient within the past 6 months     #6 We plan on seeing her every 2-3 months for continuous adjustment of her diabetes regimen      #7 patient has hypoglycemia with episodes of unawareness.     #8 patient has day-to-day variation in her mealtime which confounds the degree of insulin dosing with multiple daily injections.     #9 patient has completed diabetes education program with us.     #10 she has demonstrated the ability to self monitor her glucose.         #11 Patient is motivated in improving  diabetes control         Lipid Management                Triglycerides   Date Value Ref Range Status   01/14/2016 41 20 - 199 mg/dl Final       Comment:       TRIG DESIRED: < 200 MG/DL              HDL Cholesterol   Date Value Ref Range Status   01/14/2016 61 60 - 200 mg/dl Final       Comment:       HDL DESIRED: > 60 MG/DL              LDL Cholesterol    Date Value Ref Range Status   01/14/2016 99 0 - 129 mg/dl Final       Comment:       Calculated LDL results only valid if Triglycerides are < 400 mg/dL.  LDL DESIRED: < 130 MG/DL  LDL DESIRED: < 130 MG/DL               Blood Pressure Management        nl w/o ace I        Microvascular Complication Monitoring:             No neuropathy        Preventive Care:  Patient is not smoking     Weight Related:  Not overweight, No obesity     Bone Health     low vit D               Component      Latest Ref Rng & Units 6/3/2019   25 Hydroxy, Vitamin D      30.0 - 100.0 ng/ml 20.7 (L)       vitamin d 1000 units daily         Other Diabetes Related Aspects  Jan 2016     nl celiac                Lab Results   Component Value Date     TSH 2.230 06/03/2019                Lab Results   Component Value Date     [id number] 06/03/2019                                    Lab Results   Component Value Date     TSH 2.230 06/03/2019                     -------------------------------        Now on omeprazole 20 mg daily             4. Follow-up: Return in about 6 years (around 6/17/2026) for Recheck.

## 2020-06-18 ENCOUNTER — TELEPHONE (OUTPATIENT)
Dept: ENDOCRINOLOGY | Facility: CLINIC | Age: 20
End: 2020-06-18

## 2020-06-18 DIAGNOSIS — R79.89 ABNORMAL THYROID BLOOD TEST: Primary | ICD-10-CM

## 2020-06-18 LAB — HBA1C MFR BLD: 11.6 % (ref 4.8–5.6)

## 2020-06-18 NOTE — TELEPHONE ENCOUNTER
Pt aware   ----- Message from BUBBA Mejias sent at 6/18/2020  3:03 PM CDT -----  Let her knwo the A1c was 11.6; we made adjustments yesterday; thyroid was low; we don't treat with one abnormal but we need to repeat with antibodies I will put in order she can do anytime , vitamin d low at 19 needs 50,000 untis weekly

## 2020-06-19 ENCOUNTER — LAB (OUTPATIENT)
Dept: LAB | Facility: HOSPITAL | Age: 20
End: 2020-06-19

## 2020-06-19 PROCEDURE — 84443 ASSAY THYROID STIM HORMONE: CPT | Performed by: NURSE PRACTITIONER

## 2020-06-19 PROCEDURE — 84439 ASSAY OF FREE THYROXINE: CPT | Performed by: NURSE PRACTITIONER

## 2020-06-19 PROCEDURE — 86376 MICROSOMAL ANTIBODY EACH: CPT | Performed by: NURSE PRACTITIONER

## 2020-06-19 PROCEDURE — 84480 ASSAY TRIIODOTHYRONINE (T3): CPT | Performed by: NURSE PRACTITIONER

## 2020-06-19 PROCEDURE — 36415 COLL VENOUS BLD VENIPUNCTURE: CPT | Performed by: NURSE PRACTITIONER

## 2020-06-20 LAB
T3 SERPL-MCNC: 159 NG/DL (ref 80–200)
T4 FREE SERPL-MCNC: 0.98 NG/DL (ref 0.93–1.7)
TSH SERPL DL<=0.05 MIU/L-ACNC: 1.72 UIU/ML (ref 0.27–4.2)

## 2020-06-21 LAB — THYROPEROXIDASE AB SERPL-ACNC: <9 IU/ML (ref 0–34)

## 2020-06-22 ENCOUNTER — TELEPHONE (OUTPATIENT)
Dept: ENDOCRINOLOGY | Facility: CLINIC | Age: 20
End: 2020-06-22

## 2020-06-22 NOTE — TELEPHONE ENCOUNTER
Pt aware   ----- Message from BUBBA Mejias sent at 6/22/2020  7:57 AM CDT -----  Repeat thyroid levels are normal the test for Hashimoto's is negative

## 2020-06-29 ENCOUNTER — DOCUMENTATION (OUTPATIENT)
Dept: ENDOCRINOLOGY | Facility: CLINIC | Age: 20
End: 2020-06-29

## 2020-07-27 ENCOUNTER — OFFICE VISIT (OUTPATIENT)
Dept: ENDOCRINOLOGY | Facility: CLINIC | Age: 20
End: 2020-07-27

## 2020-07-27 VITALS
DIASTOLIC BLOOD PRESSURE: 64 MMHG | WEIGHT: 198.7 LBS | HEIGHT: 68 IN | OXYGEN SATURATION: 99 % | SYSTOLIC BLOOD PRESSURE: 102 MMHG | BODY MASS INDEX: 30.11 KG/M2 | HEART RATE: 108 BPM

## 2020-07-27 DIAGNOSIS — E55.9 VITAMIN D DEFICIENCY: ICD-10-CM

## 2020-07-27 DIAGNOSIS — E10.65 TYPE 1 DIABETES MELLITUS WITH HYPERGLYCEMIA (HCC): Primary | ICD-10-CM

## 2020-07-27 PROCEDURE — 99214 OFFICE O/P EST MOD 30 MIN: CPT | Performed by: NURSE PRACTITIONER

## 2020-07-27 NOTE — PROGRESS NOTES
Subjective    Anna García is a 20 y.o. female. she is here today for follow-up.    History of Present Illness      IN OFFICE VISIT     History of Present Illness      Reason -  Diabetes         Duration/Timing:  Diabetes mellitus type 1, Age at onset of diabetes: 8 years     Timing - constant      Quality -  not controlled               Severity (Complications/Hospitalizations)     Secondary Macrovascular Complications:  No CAD, No CVA, No PAD  Secondary Microvascular Complications:  No Diabetic Nephropathy, No proteinuria, No Diabetic Retinopathy, No Diabetic Neuropathy     Context  Diabetes Regimen:  Insulin,                                          Lab Results   Component Value Date    HGBA1C 11.60 (H) 06/17/2020                   Blood Glucose Readings            now on Omni pod            Dexcom G6         downloaded and reviewed    Dated from July 14 - July 27, 2020      Average BG -- 312     81 % very high     17% high         2%  in range                  Exercise:  Does not exercise     Associated Signs/Symptoms  Hyperglycemic Symptoms:  Positive  polyuria, No polydipsia, No polyphagia  Hypoglycemic Episodes:  + documented hypoglycemia          The following portions of the patient's history were reviewed and updated as appropriate:   Past Medical History:   Diagnosis Date   • Abdominal pain    • Acute bronchitis    • Acute pharyngitis    • Allergic rhinitis    • Asteatotic eczema    • Carbuncle of buttock    • Chalazion     - right upper and lower eyelids   • Conjunctivitis    • Constipation    • Contact dermatitis    • Diabetic ketoacidosis (CMS/HCC)     - history of   • Diarrhea    • DKA (diabetic ketoacidoses) (CMS/HCC)    • Esotropia    • Fatigue    • Folliculitis    • Hordeolum internum of right lower eyelid    • Influenza    • Laceration of skin of chin    • Nausea and vomiting    • Otitis media    • Tibial torsion      - left leg   • Type 1 diabetes mellitus (CMS/HCC)    • Vitamin D  deficiency      Past Surgical History:   Procedure Laterality Date   • CRYOTHERAPY  10/13/2010    acne   • OTHER SURGICAL HISTORY  05/04/2011    Remove Impacted Cerumen 18621      Family History   Problem Relation Age of Onset   • Breast cancer Maternal Grandmother    • Hypertension Mother    • Asthma Sister    • Heart disease Maternal Grandfather    • Colon cancer Neg Hx    • Endometrial cancer Neg Hx    • Ovarian cancer Neg Hx      OB History    None       Current Outpatient Medications   Medication Sig Dispense Refill   • glucose blood (ACCU-CHEK SONA PLUS) test strip 200 each by Other route 6 (Six) Times a Day. Use as instructed 200 each 11   • glucose blood test strip Testing 4 times daily, DX E10.65 120 each 11   • insulin lispro (humaLOG) 100 UNIT/ML injection Up to 30 units with meals. 1 unit per 6 grams and 1 unit per 30 above 140 27 mL 5   • Insulin Pen Needle (B-D UF III MINI PEN NEEDLES) 31G X 5 MM misc USE 4-6 TIMES PER  each 10   • ONE TOUCH ULTRA TEST test strip TEST BLOOD SUGAR 6 TIMES DAILY AS DIRECTED 150 each 4   • PRECISION XTRA strip CHECK AS NEEDED WHEN BLOOD SUGAR ABOVE 250 60 each 4   • sertraline (ZOLOFT) 50 MG tablet Take 50 mg by mouth Daily.     • TILIA FE 1-20/1-30/1-35 MG-MCG tablet TAKE 1 TABLET BY MOUTH DAILY 84 tablet 4   • albuterol (PROAIR RESPICLICK) 108 (90 Base) MCG/ACT inhaler Inhale 2 puffs Every 4 (Four) Hours As Needed for Wheezing or Shortness of Air. 1 inhaler 11   • Blood Glucose/Ketone Monitor device Use as indicated 50 each 11   • clotrimazole-betamethasone (LOTRISONE) 1-0.05 % cream Apply  topically 2 (Two) Times a Day As Needed (itching). 45 g 1   • fluconazole (DIFLUCAN) 150 MG tablet Take 1 tablet by mouth today and repeat in 4 days if symptoms still present. 2 tablet 6   • Glucagon (Gvoke HypoPen 1-Pack) 1 MG/0.2ML solution auto-injector Inject 1 mg under the skin into the appropriate area as directed As Needed (hypoglycemia). 2 mL 11   • GLUCAGON EMERGENCY  1 MG injection USE AS DIRECTED IN THE SETTING OF HYPOGLYCEMIA (LESS THAN 70) AND NONRESPONSIVENESS, WHEN IT IS UNSAFE TO TAKE ORAL GLUCOSE 1 each 6   • Insulin Glargine, 1 Unit Dial, (Toujeo SoloStar) 300 UNIT/ML solution pen-injector injection Inject 45 Units under the skin into the appropriate area as directed Daily. 4.5 mL 11   • insulin lispro (HumaLOG) 100 UNIT/ML injection 100 units daily through 30 mL 12   • ondansetron ODT (ZOFRAN-ODT) 4 MG disintegrating tablet Take 1 tablet by mouth Every 8 (Eight) Hours As Needed for Nausea or Vomiting. 30 tablet 11   • Urine Glucose-Ketones Test (KETO-DIASTIX) strip Test if BG greater than 250 50 each 11     No current facility-administered medications for this visit.      Allergies   Allergen Reactions   • Cefprozil Hives     Social History     Socioeconomic History   • Marital status:      Spouse name: Not on file   • Number of children: Not on file   • Years of education: Not on file   • Highest education level: Not on file   Tobacco Use   • Smoking status: Never Smoker   • Smokeless tobacco: Never Used   Substance and Sexual Activity   • Alcohol use: No   • Drug use: No   • Sexual activity: Never       Review of Systems  Review of Systems   Constitutional: Negative for activity change, appetite change, diaphoresis and fatigue.   HENT: Negative for facial swelling, sneezing, sore throat, tinnitus, trouble swallowing and voice change.    Eyes: Negative for photophobia, pain, discharge, redness, itching and visual disturbance.   Respiratory: Negative for apnea, cough, choking, chest tightness and shortness of breath.    Cardiovascular: Negative for chest pain, palpitations and leg swelling.   Gastrointestinal: Negative for abdominal distention, abdominal pain, constipation, diarrhea, nausea and vomiting.   Endocrine: Negative for cold intolerance, heat intolerance, polydipsia, polyphagia and polyuria.   Genitourinary: Negative for difficulty urinating, dysuria,  "frequency, hematuria and urgency.   Musculoskeletal: Negative for arthralgias, back pain, gait problem, joint swelling, myalgias, neck pain and neck stiffness.   Skin: Negative for color change, pallor, rash and wound.   Neurological: Negative for dizziness, tremors, weakness, light-headedness, numbness and headaches.   Hematological: Negative for adenopathy. Does not bruise/bleed easily.   Psychiatric/Behavioral: Negative for behavioral problems, confusion and sleep disturbance.        Objective    /64   Pulse 108   Ht 172.7 cm (68\")   Wt 90.1 kg (198 lb 11.2 oz)   SpO2 99%   BMI 30.21 kg/m²   Physical Exam   Constitutional: She is oriented to person, place, and time. She appears well-developed and well-nourished. No distress.   HENT:   Head: Normocephalic and atraumatic.   Right Ear: External ear normal.   Left Ear: External ear normal.   Nose: Nose normal.   Eyes: Pupils are equal, round, and reactive to light. Conjunctivae and EOM are normal.   Neck: Normal range of motion. Neck supple. No tracheal deviation present. No thyromegaly present.   Cardiovascular: Normal rate, regular rhythm and normal heart sounds.   No murmur heard.  Pulmonary/Chest: Effort normal and breath sounds normal. No respiratory distress. She has no wheezes.   Abdominal: Soft. Bowel sounds are normal. There is no tenderness. There is no rebound and no guarding.   Musculoskeletal: Normal range of motion. She exhibits no edema, tenderness or deformity.   Neurological: She is alert and oriented to person, place, and time. No cranial nerve deficit.   Skin: Skin is warm and dry. No rash noted.   Psychiatric: She has a normal mood and affect. Her behavior is normal. Judgment and thought content normal.       Lab Review  Glucose (mg/dL)   Date Value   06/17/2020 166 (H)   03/21/2020 379 (H)   09/05/2019 117 (H)     Sodium (mmol/L)   Date Value   06/17/2020 137   03/21/2020 137   09/05/2019 141     Potassium (mmol/L)   Date Value "   06/17/2020 3.7   03/21/2020 4.0   09/05/2019 3.8     Chloride (mmol/L)   Date Value   06/17/2020 101   03/21/2020 97 (L)   09/05/2019 98     CO2 (mmol/L)   Date Value   06/17/2020 24.0   03/21/2020 23.0   09/05/2019 27.4     BUN (mg/dL)   Date Value   06/17/2020 9   03/21/2020 11   09/05/2019 11     Creatinine (mg/dL)   Date Value   06/17/2020 0.53 (L)   03/21/2020 0.82   09/05/2019 0.63     Hemoglobin A1C (%)   Date Value   06/17/2020 11.60 (H)   10/10/2019 8.8 (H)   06/03/2019 8.80 (H)   03/20/2019 11.1 (H)   01/09/2019 11.5 (H)   11/21/2018 13.0 (H)     Triglycerides (mg/dl)   Date Value   01/14/2016 41     LDL Cholesterol  (mg/dl)   Date Value   01/14/2016 99       Assessment/Plan      1. Type 1 diabetes mellitus with hyperglycemia (CMS/[facility])    2. Vitamin D deficiency    .    Medications prescribed:  Outpatient Encounter Medications as of 7/27/2020   Medication Sig Dispense Refill   • glucose blood (ACCU-CHEK SONA PLUS) test strip 200 each by Other route 6 (Six) Times a Day. Use as instructed 200 each 11   • glucose blood test strip Testing 4 times daily, DX E10.65 120 each 11   • insulin lispro (humaLOG) 100 UNIT/ML injection Up to 30 units with meals. 1 unit per 6 grams and 1 unit per 30 above 140 27 mL 5   • Insulin Pen Needle (B-D UF III MINI PEN NEEDLES) 31G X 5 MM misc USE 4-6 TIMES PER  each 10   • ONE TOUCH ULTRA TEST test strip TEST BLOOD SUGAR 6 TIMES DAILY AS DIRECTED 150 each 4   • PRECISION XTRA strip CHECK AS NEEDED WHEN BLOOD SUGAR ABOVE 250 60 each 4   • sertraline (ZOLOFT) 50 MG tablet Take 50 mg by mouth Daily.     • TILIA FE 1-20/1-30/1-35 MG-MCG tablet TAKE 1 TABLET BY MOUTH DAILY 84 tablet 4   • albuterol (PROAIR RESPICLICK) 108 (90 Base) MCG/ACT inhaler Inhale 2 puffs Every 4 (Four) Hours As Needed for Wheezing or Shortness of Air. 1 inhaler 11   • Blood Glucose/Ketone Monitor device Use as indicated 50 each 11   • clotrimazole-betamethasone (LOTRISONE) 1-0.05 % cream Apply   topically 2 (Two) Times a Day As Needed (itching). 45 g 1   • fluconazole (DIFLUCAN) 150 MG tablet Take 1 tablet by mouth today and repeat in 4 days if symptoms still present. 2 tablet 6   • Glucagon (Gvoke HypoPen 1-Pack) 1 MG/0.2ML solution auto-injector Inject 1 mg under the skin into the appropriate area as directed As Needed (hypoglycemia). 2 mL 11   • GLUCAGON EMERGENCY 1 MG injection USE AS DIRECTED IN THE SETTING OF HYPOGLYCEMIA (LESS THAN 70) AND NONRESPONSIVENESS, WHEN IT IS UNSAFE TO TAKE ORAL GLUCOSE 1 each 6   • Insulin Glargine, 1 Unit Dial, (Toujeo SoloStar) 300 UNIT/ML solution pen-injector injection Inject 45 Units under the skin into the appropriate area as directed Daily. 4.5 mL 11   • insulin lispro (HumaLOG) 100 UNIT/ML injection 100 units daily through 30 mL 12   • ondansetron ODT (ZOFRAN-ODT) 4 MG disintegrating tablet Take 1 tablet by mouth Every 8 (Eight) Hours As Needed for Nausea or Vomiting. 30 tablet 11   • Urine Glucose-Ketones Test (KETO-DIASTIX) strip Test if BG greater than 250 50 each 11     No facility-administered encounter medications on file as of 7/27/2020.        Orders placed during this encounter include:  Orders Placed This Encounter   Procedures   • Comprehensive Metabolic Panel   • Hemoglobin A1c   • Vitamin D 25 Hydroxy   • Vitamin B12   • TSH   • Protein / Creatinine Ratio, Urine - Urine, Clean Catch   • Microalbumin / Creatinine Urine Ratio - Urine, Clean Catch   • CBC & Differential     Order Specific Question:   Manual Differential     Answer:   No     Glycemic Management:    diabetes mellitus type 1 with hyperglycemia            Lab Results   Component Value Date    HGBA1C 11.60 (H) 06/17/2020      Dexcom G6         downloaded and reviewed    Dated from July 14 - July 27, 2020      Average BG -- 312     81 % very high     17% high         2%  in range         now on Omni pod     Basal     MN - 1.45 ---- changed to 1.55 by patient       Carb  ratio      6      Correction     30       Target 150         Previous regimen      Toujeo taking  38        Humalog  using 1 per 4 carb ---           +         Sliding scale      1 unit for every 30 above 140                Lipid Management        Lab Results   Component Value Date    LDL 99 01/14/2016                Blood Pressure Management        nl w/o ace I        Microvascular Complication Monitoring:             No neuropathy         Preventive Care:  Patient is not smoking     Weight Related:  Not overweight, No obesity     Bone Health     low vit D              Component      Latest Ref Rng & Units 6/3/2019   25 Hydroxy, Vitamin D      30.0 - 100.0 ng/ml 20.7 (L)       vitamin d 1000 units daily         Other Diabetes Related Aspects  Jan 2016     nl celiac      Lab Results   Component Value Date    TSH 1.720 06/19/2020             Lab Results   Component Value Date    [id number] 347 06/03/2019                   -------------------------------        Now on omeprazole 20 mg daily                4. Follow-up: Return for Recheck.

## 2020-09-08 PROBLEM — E66.09 CLASS 1 OBESITY DUE TO EXCESS CALORIES WITHOUT SERIOUS COMORBIDITY WITH BODY MASS INDEX (BMI) OF 30.0 TO 30.9 IN ADULT: Status: ACTIVE | Noted: 2020-03-04

## 2020-09-08 NOTE — PROGRESS NOTES
Pulmonary Office Follow-up     Anna García is seen in the office today for   Chief Complaint   Patient presents with   • Asthma     6-month follow-up   .    Subjective     History of Present Illness  Anna García is a 20 y.o. female with a PMH significant for asthma, type 1 diabetes mellitus, vitamin D deficiency, and GERD who presents for follow-up of asthma.     9/5/2019: She was doing better since starting Asmanex twice daily so I recommended continuing with it and advised that if she does develop persistent dyspnea while on metoprolol I would recommend changing it to a different agent that would not have the beta blocking effect.    12/5/2019: She was doing well after de-escalating from Asmanex with rare albuterol use.  I counseled her on indications for restarting Asmanex and encouraged regular comprehensive exercise.    3/9/20: She continued to do well with rare albuterol use so I recommended monitoring her symptoms and restarting Asmanex if she had increased dyspnea and albuterol.    9/9/20: She states she has been doing well and not had any issues with her asthma.  She has not needed to use her albuterol recently.  Patient denies any cough, sputum, wheeze, or chest pain.  She did finally get her new insulin pump and states that her blood sugars are better controlled.  Her estimated A1c has trended down from over 11 to just over 8.  She denies any issues with hypoglycemia.  Patient is starting school to become a teacher.  She is already gotten her influenza vaccination.      Review of Systems: History obtained from chart review and the patient.  Review of Systems   Constitutional: Negative for unexpected weight change.   HENT: Negative for congestion and postnasal drip.    Respiratory: Negative for cough, chest tightness, shortness of breath and wheezing.    Cardiovascular: Negative for chest pain and palpitations.     As described in the HPI. Otherwise, remainder of ROS (14 systems)  were negative.    Patient Active Problem List   Diagnosis   • Vitamin D deficiency   • Type 1 diabetes mellitus with hyperglycemia (CMS/[facility])   • Menorrhagia with regular cycle   • Recurrent vaginitis   • Tachycardia   • Palpitation   • Mild intermittent asthma without complication   • Class 1 obesity due to excess calories without serious comorbidity with body mass index (BMI) of 30.0 to 30.9 in adult         Current Outpatient Medications:   •  albuterol (PROAIR RESPICLICK) 108 (90 Base) MCG/ACT inhaler, Inhale 2 puffs Every 4 (Four) Hours As Needed for Wheezing or Shortness of Air., Disp: 1 inhaler, Rfl: 11  •  Blood Glucose/Ketone Monitor device, Use as indicated, Disp: 50 each, Rfl: 11  •  clotrimazole-betamethasone (LOTRISONE) 1-0.05 % cream, Apply  topically 2 (Two) Times a Day As Needed (itching)., Disp: 45 g, Rfl: 1  •  fluconazole (DIFLUCAN) 150 MG tablet, Take 1 tablet by mouth today and repeat in 4 days if symptoms still present., Disp: 2 tablet, Rfl: 6  •  Glucagon (Gvoke HypoPen 1-Pack) 1 MG/0.2ML solution auto-injector, Inject 1 mg under the skin into the appropriate area as directed As Needed (hypoglycemia)., Disp: 2 mL, Rfl: 11  •  GLUCAGON EMERGENCY 1 MG injection, USE AS DIRECTED IN THE SETTING OF HYPOGLYCEMIA (LESS THAN 70) AND NONRESPONSIVENESS, WHEN IT IS UNSAFE TO TAKE ORAL GLUCOSE, Disp: 1 each, Rfl: 6  •  glucose blood (ACCU-CHEK SONA PLUS) test strip, 200 each by Other route 6 (Six) Times a Day. Use as instructed, Disp: 200 each, Rfl: 11  •  glucose blood test strip, Testing 4 times daily, DX E10.65, Disp: 120 each, Rfl: 11  •  insulin lispro (HumaLOG) 100 UNIT/ML injection, 100 units daily through, Disp: 30 mL, Rfl: 12  •  Insulin Pen Needle (B-D UF III MINI PEN NEEDLES) 31G X 5 MM misc, USE 4-6 TIMES PER DAY, Disp: 200 each, Rfl: 10  •  ondansetron ODT (ZOFRAN-ODT) 4 MG disintegrating tablet, Take 1 tablet by mouth Every 8 (Eight) Hours As Needed for Nausea or Vomiting., Disp: 30 tablet,  Rfl: 11  •  ONE TOUCH ULTRA TEST test strip, TEST BLOOD SUGAR 6 TIMES DAILY AS DIRECTED, Disp: 150 each, Rfl: 4  •  PRECISION XTRA strip, CHECK AS NEEDED WHEN BLOOD SUGAR ABOVE 250, Disp: 60 each, Rfl: 4  •  sertraline (ZOLOFT) 50 MG tablet, Take 50 mg by mouth Daily., Disp: , Rfl:   •  TILIA FE 1-20/1-30/1-35 MG-MCG tablet, TAKE 1 TABLET BY MOUTH DAILY, Disp: 84 tablet, Rfl: 4  •  Urine Glucose-Ketones Test (KETO-DIASTIX) strip, Test if BG greater than 250, Disp: 50 each, Rfl: 11    Allergies   Allergen Reactions   • Cefprozil Hives       Past Medical History:   Diagnosis Date   • Abdominal pain    • Acute bronchitis    • Acute pharyngitis    • Allergic rhinitis    • Asteatotic eczema    • Carbuncle of buttock    • Chalazion     - right upper and lower eyelids   • Conjunctivitis    • Constipation    • Contact dermatitis    • Diabetic ketoacidosis (CMS/HCC)     - history of   • Diarrhea    • DKA (diabetic ketoacidoses) (CMS/HCC)    • Esotropia    • Fatigue    • Folliculitis    • Hordeolum internum of right lower eyelid    • Influenza    • Laceration of skin of chin    • Nausea and vomiting    • Otitis media    • Tibial torsion      - left leg   • Type 1 diabetes mellitus (CMS/HCC)    • Vitamin D deficiency      Past Surgical History:   Procedure Laterality Date   • CRYOTHERAPY  10/13/2010    acne   • OTHER SURGICAL HISTORY  05/04/2011    Remove Impacted Cerumen 92171      Social History     Socioeconomic History   • Marital status:      Spouse name: Not on file   • Number of children: Not on file   • Years of education: Not on file   • Highest education level: Not on file   Tobacco Use   • Smoking status: Never Smoker   • Smokeless tobacco: Never Used   Substance and Sexual Activity   • Alcohol use: No   • Drug use: No   • Sexual activity: Never     Family History   Problem Relation Age of Onset   • Breast cancer Maternal Grandmother    • Hypertension Mother    • Asthma Sister    • Heart disease Maternal  "Grandfather    • Colon cancer Neg Hx    • Endometrial cancer Neg Hx    • Ovarian cancer Neg Hx           Objective     Blood pressure 102/78, pulse 95, height 172.7 cm (68\"), weight 93.9 kg (207 lb), SpO2 97 %, not currently breastfeeding.  Physical Exam   Constitutional: She is oriented to person, place, and time. Vital signs are normal. She appears well-developed and well-nourished.   HENT:   Head: Normocephalic and atraumatic.   Masked   Eyes: Pupils are equal, round, and reactive to light. Conjunctivae, EOM and lids are normal.   Neck: Trachea normal and normal range of motion. No tracheal tenderness present. No thyroid mass present.   Cardiovascular: Normal rate, regular rhythm and normal heart sounds. PMI is not displaced. Exam reveals no gallop.   No murmur heard.  Pulmonary/Chest: Effort normal and breath sounds normal. No respiratory distress. She has no decreased breath sounds. She has no wheezes. She has no rhonchi. She exhibits no tenderness.   Abdominal: Soft. Normal appearance and bowel sounds are normal. There is no hepatomegaly. There is no tenderness.   Musculoskeletal:   Normal gait, no extremity edema   Lymphadenopathy:        Head (right side): No submandibular adenopathy present.        Head (left side): No submandibular adenopathy present.     She has no cervical adenopathy.        Right: No supraclavicular adenopathy present.        Left: No supraclavicular adenopathy present.   Neurological: She is alert and oriented to person, place, and time.   Skin: Skin is warm and dry. No rash noted. No cyanosis. Nails show no clubbing.   Psychiatric: She has a normal mood and affect. Her speech is normal and behavior is normal. Judgment normal.   Nursing note and vitals reviewed.      PFTs: 8/5/19 (independently reviewed and interpreted by me)  Ratio 46  FVC 3.41/ 79%  FEV1 1.55/ 41%  TLC 4.28/ 75%  DLCO 32.21/ 108%  Severe obstruction with no significant bronchodilator response.  Borderline TLC.  " Normal diffusing capacity.  No comparative data available.    Radiology (independently reviewed and interpreted by me): CXR 8/5/19 showed NACPD         Assessment/Plan     Anna was seen today for asthma.    Diagnoses and all orders for this visit:    Mild intermittent asthma without complication    Class 1 obesity due to excess calories without serious comorbidity with body mass index (BMI) of 30.0 to 30.9 in adult         Discussion/ Recommendations:   She she remained stable with rare albuterol use and she is not having any issues with allergies.  I did encourage her to try to stay active and to use her albuterol when needed.    -Use ProAir Respiclick 2 puffs every 4 hours as needed for dyspnea or wheeze.  Okay to use prior to exercise if necessary.  -Counseled on allergen and trigger avoidance.  -Encouraged her to start regular, progressive exercise.  -Up-to-date with influenza vaccination.    Patient's Body mass index is 31.47 kg/m². BMI is within normal parameters. No follow-up required..           Return in about 6 months (around 3/9/2021) for Recheck asthma.      Thank you for allowing me to participate in the care of Anna García. Please do not hesitate to contact me with any questions.         This document has been electronically signed by Jocelin Vergara MD on September 9, 2020 14:04      Dictated using Dragon

## 2020-09-09 ENCOUNTER — OFFICE VISIT (OUTPATIENT)
Dept: PULMONOLOGY | Facility: CLINIC | Age: 20
End: 2020-09-09

## 2020-09-09 VITALS
HEIGHT: 68 IN | WEIGHT: 207 LBS | SYSTOLIC BLOOD PRESSURE: 102 MMHG | OXYGEN SATURATION: 97 % | BODY MASS INDEX: 31.37 KG/M2 | DIASTOLIC BLOOD PRESSURE: 78 MMHG | HEART RATE: 95 BPM

## 2020-09-09 DIAGNOSIS — J45.30 MILD PERSISTENT ASTHMA WITHOUT COMPLICATION: ICD-10-CM

## 2020-09-09 DIAGNOSIS — J45.20 MILD INTERMITTENT ASTHMA WITHOUT COMPLICATION: Primary | ICD-10-CM

## 2020-09-09 DIAGNOSIS — E66.09 CLASS 1 OBESITY DUE TO EXCESS CALORIES WITHOUT SERIOUS COMORBIDITY WITH BODY MASS INDEX (BMI) OF 30.0 TO 30.9 IN ADULT: ICD-10-CM

## 2020-09-09 PROCEDURE — 99213 OFFICE O/P EST LOW 20 MIN: CPT | Performed by: INTERNAL MEDICINE

## 2020-09-28 ENCOUNTER — DOCUMENTATION (OUTPATIENT)
Dept: ENDOCRINOLOGY | Facility: CLINIC | Age: 20
End: 2020-09-28

## 2020-10-19 ENCOUNTER — TELEPHONE (OUTPATIENT)
Dept: ENDOCRINOLOGY | Facility: CLINIC | Age: 20
End: 2020-10-19

## 2020-10-19 NOTE — TELEPHONE ENCOUNTER
I called and spoke with pt and let her know we could try changing her basel rate to 1.65 and also to be drinking lots of fluids to help her pee out the sugars and to watch her sugars through out the night and if need be change her site if she starts smelling insulin

## 2020-10-19 NOTE — TELEPHONE ENCOUNTER
Would like for you to call her about her sugars running in [phone] says that she has an appointment on 10/27 and wants to know if there is something she can do before that appointment

## 2020-10-27 ENCOUNTER — TELEMEDICINE (OUTPATIENT)
Dept: ENDOCRINOLOGY | Facility: CLINIC | Age: 20
End: 2020-10-27

## 2020-10-27 ENCOUNTER — TELEPHONE (OUTPATIENT)
Dept: ENDOCRINOLOGY | Facility: CLINIC | Age: 20
End: 2020-10-27

## 2020-10-27 DIAGNOSIS — E10.65 TYPE 1 DIABETES MELLITUS WITH HYPERGLYCEMIA (HCC): Primary | ICD-10-CM

## 2020-10-27 PROCEDURE — 95251 CONT GLUC MNTR ANALYSIS I&R: CPT | Performed by: INTERNAL MEDICINE

## 2020-10-27 PROCEDURE — 99213 OFFICE O/P EST LOW 20 MIN: CPT | Performed by: INTERNAL MEDICINE

## 2020-10-27 RX ORDER — INSULIN LISPRO-AABC 100 [IU]/ML
INJECTION, SOLUTION INTRAVENOUS; SUBCUTANEOUS
Qty: 30 ML | Refills: 11 | Status: SHIPPED | OUTPATIENT
Start: 2020-10-27 | End: 2020-12-15 | Stop reason: SDUPTHER

## 2020-10-27 RX ORDER — GLUCAGON 3 MG/1
1 POWDER NASAL AS NEEDED
Qty: 2 EACH | Refills: 11 | Status: SHIPPED | OUTPATIENT
Start: 2020-10-27 | End: 2020-12-03 | Stop reason: SDUPTHER

## 2020-10-27 NOTE — PROGRESS NOTES
Subjective    Anna García is a 20 y.o. female. she is here today for follow-up.          This was a Telephone Encounter. Benefits and Disadvantages of a Telephone Visit were discussed and accepted by patient. .  Patient agreed to receive service through Telephone Visit as patient is being compliant with social distancing recommendations imparted by CDC.     You have chosen to receive care through a telephone visit. Do you consent to use a telephone visit for your medical care today? Yes        History of Present Illness      IN OFFICE VISIT     History of Present Illness      Reason -  Diabetes         Duration/Timing:  Diabetes mellitus type 1, Age at onset of diabetes: 8 years     Timing - constant      Quality -  not controlled               Severity (Complications/Hospitalizations)     Secondary Macrovascular Complications:  No CAD, No CVA, No PAD  Secondary Microvascular Complications:  No Diabetic Nephropathy, No proteinuria, No Diabetic Retinopathy, No Diabetic Neuropathy     Context  Diabetes Regimen:      omnipod  and dexcom         Lab Results   Component Value Date    HGBA1C 11.60 (H) 06/17/2020                   Exercise:  Does not exercise     Associated Signs/Symptoms  Hyperglycemic Symptoms:  Positive  polyuria, No polydipsia, No polyphagia  Hypoglycemic Episodes:  + documented hypoglycemia  See dexcom below           The following portions of the patient's history were reviewed and updated as appropriate:   Past Medical History:   Diagnosis Date   • Abdominal pain    • Acute bronchitis    • Acute pharyngitis    • Allergic rhinitis    • Asteatotic eczema    • Carbuncle of buttock    • Chalazion     - right upper and lower eyelids   • Conjunctivitis    • Constipation    • Contact dermatitis    • Diabetic ketoacidosis (CMS/HCC)     - history of   • Diarrhea    • DKA (diabetic ketoacidoses) (CMS/HCC)    • Esotropia    • Fatigue    • Folliculitis    • Hordeolum internum of right lower eyelid     • Influenza    • Laceration of skin of chin    • Nausea and vomiting    • Otitis media    • Tibial torsion      - left leg   • Type 1 diabetes mellitus (CMS/HCC)    • Vitamin D deficiency      Past Surgical History:   Procedure Laterality Date   • CRYOTHERAPY  10/13/2010    acne   • OTHER SURGICAL HISTORY  05/04/2011    Remove Impacted Cerumen 71504      Family History   Problem Relation Age of Onset   • Breast cancer Maternal Grandmother    • Hypertension Mother    • Asthma Sister    • Heart disease Maternal Grandfather    • Colon cancer Neg Hx    • Endometrial cancer Neg Hx    • Ovarian cancer Neg Hx      OB History    No obstetric history on file.       Current Outpatient Medications   Medication Sig Dispense Refill   • albuterol (PROAIR RESPICLICK) 108 (90 Base) MCG/ACT inhaler Inhale 2 puffs Every 4 (Four) Hours As Needed for Wheezing or Shortness of Air. 1 inhaler 11   • Blood Glucose/Ketone Monitor device Use as indicated 50 each 11   • clotrimazole-betamethasone (LOTRISONE) 1-0.05 % cream Apply  topically 2 (Two) Times a Day As Needed (itching). 45 g 1   • fluconazole (DIFLUCAN) 150 MG tablet Take 1 tablet by mouth today and repeat in 4 days if symptoms still present. 2 tablet 6   • Glucagon (Baqsimi One Pack) 3 MG/DOSE powder 1 each into the nostril(s) as directed by provider As Needed (Hypoglycemia). Apply intranasal if hypoglycemia 2 each 11   • glucose blood (ACCU-CHEK SONA PLUS) test strip 200 each by Other route 6 (Six) Times a Day. Use as instructed 200 each 11   • glucose blood test strip Testing 4 times daily, DX E10.65 120 each 11   • Insulin Lispro-aabc (Lyumjev) 100 UNIT/ML injection 35 units three times daily 30 mL 11   • Insulin Pen Needle (B-D UF III MINI PEN NEEDLES) 31G X 5 MM misc USE 4-6 TIMES PER  each 10   • ondansetron ODT (ZOFRAN-ODT) 4 MG disintegrating tablet Take 1 tablet by mouth Every 8 (Eight) Hours As Needed for Nausea or Vomiting. 30 tablet 11   • ONE TOUCH ULTRA TEST  test strip TEST BLOOD SUGAR 6 TIMES DAILY AS DIRECTED 150 each 4   • PRECISION XTRA strip CHECK AS NEEDED WHEN BLOOD SUGAR ABOVE 250 60 each 4   • sertraline (ZOLOFT) 50 MG tablet Take 50 mg by mouth Daily.     • TILIA FE 1-20/1-30/1-35 MG-MCG tablet TAKE 1 TABLET BY MOUTH DAILY 84 tablet 4   • Urine Glucose-Ketones Test (KETO-DIASTIX) strip Test if BG greater than 250 50 each 11     No current facility-administered medications for this visit.      Allergies   Allergen Reactions   • Cefprozil Hives     Social History     Socioeconomic History   • Marital status:      Spouse name: Not on file   • Number of children: Not on file   • Years of education: Not on file   • Highest education level: Not on file   Tobacco Use   • Smoking status: Never Smoker   • Smokeless tobacco: Never Used   Substance and Sexual Activity   • Alcohol use: No   • Drug use: No   • Sexual activity: Never       Review of Systems  Review of Systems   Constitutional: Negative for activity change, appetite change, diaphoresis and fatigue.   HENT: Negative for facial swelling, sneezing, sore throat, tinnitus, trouble swallowing and voice change.    Eyes: Negative for photophobia, pain, discharge, redness, itching and visual disturbance.   Respiratory: Negative for apnea, cough, choking, chest tightness and shortness of breath.    Cardiovascular: Negative for chest pain, palpitations and leg swelling.   Gastrointestinal: Negative for abdominal distention, abdominal pain, constipation, diarrhea, nausea and vomiting.   Endocrine: Negative for cold intolerance, heat intolerance, polydipsia, polyphagia and polyuria.   Genitourinary: Negative for difficulty urinating, dysuria, frequency, hematuria and urgency.   Musculoskeletal: Negative for arthralgias, back pain, gait problem, joint swelling, myalgias, neck pain and neck stiffness.   Skin: Negative for color change, pallor, rash and wound.   Neurological: Negative for dizziness, tremors,  weakness, light-headedness, numbness and headaches.   Hematological: Negative for adenopathy. Does not bruise/bleed easily.   Psychiatric/Behavioral: Negative for behavioral problems, confusion and sleep disturbance.        Objective    There were no vitals taken for this visit.    Reported Weight   Lab Review  Glucose (mg/dL)   Date Value   06/17/2020 166 (H)   03/21/2020 379 (H)   09/05/2019 117 (H)     Sodium (mmol/L)   Date Value   06/17/2020 137   03/21/2020 137   09/05/2019 141     Potassium (mmol/L)   Date Value   06/17/2020 3.7   03/21/2020 4.0   09/05/2019 3.8     Chloride (mmol/L)   Date Value   06/17/2020 101   03/21/2020 97 (L)   09/05/2019 98     CO2 (mmol/L)   Date Value   06/17/2020 24.0   03/21/2020 23.0   09/05/2019 27.4     BUN (mg/dL)   Date Value   06/17/2020 9   03/21/2020 11   09/05/2019 11     Creatinine (mg/dL)   Date Value   06/17/2020 0.53 (L)   03/21/2020 0.82   09/05/2019 0.63     Hemoglobin A1C (%)   Date Value   06/17/2020 11.60 (H)   10/10/2019 8.8 (H)   06/03/2019 8.80 (H)   03/20/2019 11.1 (H)   01/09/2019 11.5 (H)   11/21/2018 13.0 (H)     Triglycerides (mg/dl)   Date Value   01/14/2016 41     LDL Cholesterol  (mg/dl)   Date Value   01/14/2016 99       Assessment/Plan      1. Type 1 diabetes mellitus with hyperglycemia (CMS/[facility])    .    Medications prescribed:  Outpatient Encounter Medications as of 10/27/2020   Medication Sig Dispense Refill   • albuterol (PROAIR RESPICLICK) 108 (90 Base) MCG/ACT inhaler Inhale 2 puffs Every 4 (Four) Hours As Needed for Wheezing or Shortness of Air. 1 inhaler 11   • Blood Glucose/Ketone Monitor device Use as indicated 50 each 11   • clotrimazole-betamethasone (LOTRISONE) 1-0.05 % cream Apply  topically 2 (Two) Times a Day As Needed (itching). 45 g 1   • fluconazole (DIFLUCAN) 150 MG tablet Take 1 tablet by mouth today and repeat in 4 days if symptoms still present. 2 tablet 6   • Glucagon (Baqsimi One Pack) 3 MG/DOSE powder 1 each into the  nostril(s) as directed by provider As Needed (Hypoglycemia). Apply intranasal if hypoglycemia 2 each 11   • glucose blood (ACCU-CHEK SONA PLUS) test strip 200 each by Other route 6 (Six) Times a Day. Use as instructed 200 each 11   • glucose blood test strip Testing 4 times daily, DX E10.65 120 each 11   • Insulin Lispro-aabc (Lyumjev) 100 UNIT/ML injection 35 units three times daily 30 mL 11   • Insulin Pen Needle (B-D UF III MINI PEN NEEDLES) 31G X 5 MM misc USE 4-6 TIMES PER  each 10   • ondansetron ODT (ZOFRAN-ODT) 4 MG disintegrating tablet Take 1 tablet by mouth Every 8 (Eight) Hours As Needed for Nausea or Vomiting. 30 tablet 11   • ONE TOUCH ULTRA TEST test strip TEST BLOOD SUGAR 6 TIMES DAILY AS DIRECTED 150 each 4   • PRECISION XTRA strip CHECK AS NEEDED WHEN BLOOD SUGAR ABOVE 250 60 each 4   • sertraline (ZOLOFT) 50 MG tablet Take 50 mg by mouth Daily.     • TILIA FE 1-20/1-30/1-35 MG-MCG tablet TAKE 1 TABLET BY MOUTH DAILY 84 tablet 4   • Urine Glucose-Ketones Test (KETO-DIASTIX) strip Test if BG greater than 250 50 each 11   • [DISCONTINUED] Glucagon (Gvoke HypoPen 1-Pack) 1 MG/0.2ML solution auto-injector Inject 1 mg under the skin into the appropriate area as directed As Needed (hypoglycemia). 2 mL 11   • [DISCONTINUED] GLUCAGON EMERGENCY 1 MG injection USE AS DIRECTED IN THE SETTING OF HYPOGLYCEMIA (LESS THAN 70) AND NONRESPONSIVENESS, WHEN IT IS UNSAFE TO TAKE ORAL GLUCOSE 1 each 6   • [DISCONTINUED] insulin lispro (HumaLOG) 100 UNIT/ML injection 100 units daily through 30 mL 12     No facility-administered encounter medications on file as of 10/27/2020.        Orders placed during this encounter include:  No orders of the defined types were placed in this encounter.    Glycemic Management:    diabetes mellitus type 1 with hyperglycemia                Lab Results   Component Value Date    HGBA1C 11.60 (H) 06/17/2020      Dexcom G6  and  Omni pod         downloaded and reviewed  2 weeks       Running too high  States bolusing for all meals but I told her I believe she is missing boluses         Basal     MN - 1.45 ---- changed to 1.55 - 1.65       Carb ratio      6 , bolusing after meals , start bolusing before and change lyumjev       Correction     30       Target 150         nasal glucagon      Lipid Management        Lab Results   Component Value Date    LDL 99 01/14/2016                Blood Pressure Management        nl w/o ace I        Microvascular Complication Monitoring:             No neuropathy         Preventive Care:  Patient is not smoking     Weight Related:  Not overweight, No obesity     Bone Health     low vit D              Component      Latest Ref Rng & Units 6/3/2019   25 Hydroxy, Vitamin D      30.0 - 100.0 ng/ml 20.7 (L)       vitamin d 1000 units daily         Other Diabetes Related Aspects  Jan 2016     nl celiac      Lab Results   Component Value Date    TSH 1.720 06/19/2020             Lab Results   Component Value Date    [id number] 347 06/03/2019                   -------------------------------        Now on omeprazole 20 mg daily                4. Follow-up: No follow-ups on file.      I spent 15 minutes reviewing patient electronic chart , reviewing medications , past history , active problems.   I provided advice regarding management of medical conditions, refilled prescriptions , ordered labs and arranged for future appointment.   Patient was advised to contact us if there were any unanswered questions or ongoing concerns.

## 2020-11-06 ENCOUNTER — DOCUMENTATION (OUTPATIENT)
Dept: ENDOCRINOLOGY | Facility: CLINIC | Age: 20
End: 2020-11-06

## 2020-11-24 ENCOUNTER — DOCUMENTATION (OUTPATIENT)
Dept: ENDOCRINOLOGY | Facility: CLINIC | Age: 20
End: 2020-11-24

## 2020-11-24 NOTE — PROGRESS NOTES
Attempted PA for Lyumjev pen injector for pt. Cover my meds says drug is covered by current benefit plan and no further pa is activity is needed.

## 2020-12-03 ENCOUNTER — TELEMEDICINE (OUTPATIENT)
Dept: ENDOCRINOLOGY | Facility: CLINIC | Age: 20
End: 2020-12-03

## 2020-12-03 DIAGNOSIS — R79.89 ABNORMAL THYROID BLOOD TEST: ICD-10-CM

## 2020-12-03 DIAGNOSIS — E55.9 VITAMIN D DEFICIENCY: ICD-10-CM

## 2020-12-03 DIAGNOSIS — E10.65 TYPE 1 DIABETES MELLITUS WITH HYPERGLYCEMIA (HCC): Primary | ICD-10-CM

## 2020-12-03 PROCEDURE — 99442 PR PHYS/QHP TELEPHONE EVALUATION 11-20 MIN: CPT | Performed by: INTERNAL MEDICINE

## 2020-12-03 RX ORDER — GLUCAGON 3 MG/1
1 POWDER NASAL AS NEEDED
Qty: 2 EACH | Refills: 11 | Status: SHIPPED | OUTPATIENT
Start: 2020-12-03 | End: 2022-07-15 | Stop reason: SDUPTHER

## 2020-12-03 NOTE — PROGRESS NOTES
Subjective    Anna García is a 20 y.o. female. she is here today for follow-up.          This was a Telephone Encounter. Benefits and Disadvantages of a Telephone Visit were discussed and accepted by patient. .  Patient agreed to receive service through Telephone Visit as patient is being compliant with social distancing recommendations imparted by CDC.     You have chosen to receive care through a telephone visit. Do you consent to use a telephone visit for your medical care today? Yes        History of Present Illness      IN OFFICE VISIT     History of Present Illness      Reason -  Diabetes         Duration/Timing:  Diabetes mellitus type 1, Age at onset of diabetes: 8 years     Timing - constant      Quality -  not controlled               Severity (Complications/Hospitalizations)     Secondary Macrovascular Complications:  No CAD, No CVA, No PAD  Secondary Microvascular Complications:  No Diabetic Nephropathy, No proteinuria, No Diabetic Retinopathy, No Diabetic Neuropathy     Context  Diabetes Regimen:      omnipod  and dexcom         Lab Results   Component Value Date    HGBA1C 11.60 (H) 06/17/2020                   Exercise:  Does not exercise     Associated Signs/Symptoms  Hyperglycemic Symptoms:  Positive  polyuria, No polydipsia, No polyphagia  Hypoglycemic Episodes:  + documented hypoglycemia  See dexcom below           The following portions of the patient's history were reviewed and updated as appropriate:   Past Medical History:   Diagnosis Date   • Abdominal pain    • Acute bronchitis    • Acute pharyngitis    • Allergic rhinitis    • Asteatotic eczema    • Carbuncle of buttock    • Chalazion     - right upper and lower eyelids   • Conjunctivitis    • Constipation    • Contact dermatitis    • Diabetic ketoacidosis (CMS/HCC)     - history of   • Diarrhea    • DKA (diabetic ketoacidoses) (CMS/HCC)    • Esotropia    • Fatigue    • Folliculitis    • Hordeolum internum of right lower eyelid     • Influenza    • Laceration of skin of chin    • Nausea and vomiting    • Otitis media    • Tibial torsion      - left leg   • Type 1 diabetes mellitus (CMS/HCC)    • Vitamin D deficiency      Past Surgical History:   Procedure Laterality Date   • CRYOTHERAPY  10/13/2010    acne   • OTHER SURGICAL HISTORY  05/04/2011    Remove Impacted Cerumen 61587      Family History   Problem Relation Age of Onset   • Breast cancer Maternal Grandmother    • Hypertension Mother    • Asthma Sister    • Heart disease Maternal Grandfather    • Colon cancer Neg Hx    • Endometrial cancer Neg Hx    • Ovarian cancer Neg Hx      OB History    No obstetric history on file.       Current Outpatient Medications   Medication Sig Dispense Refill   • albuterol (PROAIR RESPICLICK) 108 (90 Base) MCG/ACT inhaler Inhale 2 puffs Every 4 (Four) Hours As Needed for Wheezing or Shortness of Air. 1 inhaler 11   • Blood Glucose/Ketone Monitor device Use as indicated 50 each 11   • clotrimazole-betamethasone (LOTRISONE) 1-0.05 % cream Apply  topically 2 (Two) Times a Day As Needed (itching). 45 g 1   • fluconazole (DIFLUCAN) 150 MG tablet Take 1 tablet by mouth today and repeat in 4 days if symptoms still present. 2 tablet 6   • Glucagon (Baqsimi One Pack) 3 MG/DOSE powder 1 each into the nostril(s) as directed by provider As Needed (Hypoglycemia). Apply intranasal if hypoglycemia 2 each 11   • glucose blood (ACCU-CHEK SONA PLUS) test strip 200 each by Other route 6 (Six) Times a Day. Use as instructed 200 each 11   • glucose blood test strip Testing 4 times daily, DX E10.65 120 each 11   • Insulin Lispro-aabc (Lyumjev) 100 UNIT/ML injection 35 units three times daily 30 mL 11   • Insulin Pen Needle (B-D UF III MINI PEN NEEDLES) 31G X 5 MM misc USE 4-6 TIMES PER  each 10   • ondansetron ODT (ZOFRAN-ODT) 4 MG disintegrating tablet Take 1 tablet by mouth Every 8 (Eight) Hours As Needed for Nausea or Vomiting. 30 tablet 11   • ONE TOUCH ULTRA TEST  test strip TEST BLOOD SUGAR 6 TIMES DAILY AS DIRECTED 150 each 4   • PRECISION XTRA strip CHECK AS NEEDED WHEN BLOOD SUGAR ABOVE 250 60 each 4   • sertraline (ZOLOFT) 50 MG tablet Take 50 mg by mouth Daily.     • TILIA FE 1-20/1-30/1-35 MG-MCG tablet TAKE 1 TABLET BY MOUTH DAILY 84 tablet 4   • Urine Glucose-Ketones Test (KETO-DIASTIX) strip Test if BG greater than 250 50 each 11     No current facility-administered medications for this visit.      Allergies   Allergen Reactions   • Cefprozil Hives     Social History     Socioeconomic History   • Marital status:      Spouse name: Not on file   • Number of children: Not on file   • Years of education: Not on file   • Highest education level: Not on file   Tobacco Use   • Smoking status: Never Smoker   • Smokeless tobacco: Never Used   Substance and Sexual Activity   • Alcohol use: No   • Drug use: No   • Sexual activity: Never       Review of Systems  Review of Systems   Constitutional: Negative for activity change, appetite change, diaphoresis and fatigue.   HENT: Negative for facial swelling, sneezing, sore throat, tinnitus, trouble swallowing and voice change.    Eyes: Negative for photophobia, pain, discharge, redness, itching and visual disturbance.   Respiratory: Negative for apnea, cough, choking, chest tightness and shortness of breath.    Cardiovascular: Negative for chest pain, palpitations and leg swelling.   Gastrointestinal: Negative for abdominal distention, abdominal pain, constipation, diarrhea, nausea and vomiting.   Endocrine: Negative for cold intolerance, heat intolerance, polydipsia, polyphagia and polyuria.   Genitourinary: Negative for difficulty urinating, dysuria, frequency, hematuria and urgency.   Musculoskeletal: Negative for arthralgias, back pain, gait problem, joint swelling, myalgias, neck pain and neck stiffness.   Skin: Negative for color change, pallor, rash and wound.   Neurological: Negative for dizziness, tremors,  weakness, light-headedness, numbness and headaches.   Hematological: Negative for adenopathy. Does not bruise/bleed easily.   Psychiatric/Behavioral: Negative for behavioral problems, confusion and sleep disturbance.        Objective    There were no vitals taken for this visit.    Reported Weight   Lab Review  Glucose (mg/dL)   Date Value   06/17/2020 166 (H)   03/21/2020 379 (H)   09/05/2019 117 (H)     Sodium (mmol/L)   Date Value   06/17/2020 137   03/21/2020 137   09/05/2019 141     Potassium (mmol/L)   Date Value   06/17/2020 3.7   03/21/2020 4.0   09/05/2019 3.8     Chloride (mmol/L)   Date Value   06/17/2020 101   03/21/2020 97 (L)   09/05/2019 98     CO2 (mmol/L)   Date Value   06/17/2020 24.0   03/21/2020 23.0   09/05/2019 27.4     BUN (mg/dL)   Date Value   06/17/2020 9   03/21/2020 11   09/05/2019 11     Creatinine (mg/dL)   Date Value   06/17/2020 0.53 (L)   03/21/2020 0.82   09/05/2019 0.63     Hemoglobin A1C (%)   Date Value   06/17/2020 11.60 (H)   10/10/2019 8.8 (H)   06/03/2019 8.80 (H)   03/20/2019 11.1 (H)   01/09/2019 11.5 (H)   11/21/2018 13.0 (H)     Triglycerides (mg/dl)   Date Value   01/14/2016 41     LDL Cholesterol  (mg/dl)   Date Value   01/14/2016 99       Assessment/Plan      1. Type 1 diabetes mellitus with hyperglycemia (CMS/[facility])    2. Vitamin D deficiency    3. Abnormal thyroid blood test    .    Medications prescribed:  Outpatient Encounter Medications as of 12/3/2020   Medication Sig Dispense Refill   • albuterol (PROAIR RESPICLICK) 108 (90 Base) MCG/ACT inhaler Inhale 2 puffs Every 4 (Four) Hours As Needed for Wheezing or Shortness of Air. 1 inhaler 11   • Blood Glucose/Ketone Monitor device Use as indicated 50 each 11   • clotrimazole-betamethasone (LOTRISONE) 1-0.05 % cream Apply  topically 2 (Two) Times a Day As Needed (itching). 45 g 1   • fluconazole (DIFLUCAN) 150 MG tablet Take 1 tablet by mouth today and repeat in 4 days if symptoms still present. 2 tablet 6   • Glucagon  (Baqsimi One Pack) 3 MG/DOSE powder 1 each into the nostril(s) as directed by provider As Needed (Hypoglycemia). Apply intranasal if hypoglycemia 2 each 11   • glucose blood (ACCU-CHEK SONA PLUS) test strip 200 each by Other route 6 (Six) Times a Day. Use as instructed 200 each 11   • glucose blood test strip Testing 4 times daily, DX E10.65 120 each 11   • Insulin Lispro-aabc (Lyumjev) 100 UNIT/ML injection 35 units three times daily 30 mL 11   • Insulin Pen Needle (B-D UF III MINI PEN NEEDLES) 31G X 5 MM misc USE 4-6 TIMES PER  each 10   • ondansetron ODT (ZOFRAN-ODT) 4 MG disintegrating tablet Take 1 tablet by mouth Every 8 (Eight) Hours As Needed for Nausea or Vomiting. 30 tablet 11   • ONE TOUCH ULTRA TEST test strip TEST BLOOD SUGAR 6 TIMES DAILY AS DIRECTED 150 each 4   • PRECISION XTRA strip CHECK AS NEEDED WHEN BLOOD SUGAR ABOVE 250 60 each 4   • sertraline (ZOLOFT) 50 MG tablet Take 50 mg by mouth Daily.     • TILIA FE 1-20/1-30/1-35 MG-MCG tablet TAKE 1 TABLET BY MOUTH DAILY 84 tablet 4   • Urine Glucose-Ketones Test (KETO-DIASTIX) strip Test if BG greater than 250 50 each 11   • [DISCONTINUED] Glucagon (Baqsimi One Pack) 3 MG/DOSE powder 1 each into the nostril(s) as directed by provider As Needed (Hypoglycemia). Apply intranasal if hypoglycemia 2 each 11     No facility-administered encounter medications on file as of 12/3/2020.        Orders placed during this encounter include:  No orders of the defined types were placed in this encounter.    Glycemic Management:    diabetes mellitus type 1 with hyperglycemia                Lab Results   Component Value Date    HGBA1C 11.60 (H) 06/17/2020      Dexcom G6  and  Omni pod         downloaded and reviewed  2 weeks              States bolusing for all meals but I told her I believe she is missing boluses   This is a repeat pattern       Basal     MN - 1.45 ---- changed to 1.55 - 1.65     Between 10 am to 10 pm ,increase daily 0.1 units per hour until  during the day the sugars are in the upper 100s     Limit 2.5 units hour    Carb ratio      6 , bolusing after meals , start bolusing before and change lyumjev  -- didn't do     Try again       Correction     30       Target 150         nasal glucagon     No charge for dexcom since I just billed within 1 month      Lipid Management        Lab Results   Component Value Date    LDL 99 01/14/2016                Blood Pressure Management        nl w/o ace I        Microvascular Complication Monitoring:             No neuropathy         Preventive Care:  Patient is not smoking     Weight Related:  Not overweight, No obesity     Bone Health     low vit D              Component      Latest Ref Rng & Units 6/3/2019   25 Hydroxy, Vitamin D      30.0 - 100.0 ng/ml 20.7 (L)       vitamin d 1000 units daily         Other Diabetes Related Aspects  Jan 2016     nl celiac      Lab Results   Component Value Date    TSH 1.720 06/19/2020             Lab Results   Component Value Date    [id number] 347 06/03/2019                   -------------------------------        Now on omeprazole 20 mg daily                4. Follow-up: No follow-ups on file.      I spent 15 minutes reviewing patient electronic chart , reviewing medications , past history , active problems.   I provided advice regarding management of medical conditions, refilled prescriptions , ordered labs and arranged for future appointment.   Patient was advised to contact us if there were any unanswered questions or ongoing concerns.

## 2020-12-11 ENCOUNTER — TELEPHONE (OUTPATIENT)
Dept: ENDOCRINOLOGY | Facility: CLINIC | Age: 20
End: 2020-12-11

## 2020-12-11 NOTE — TELEPHONE ENCOUNTER
Pt left a  vm stating that her insurance will not cover the 28 day supplies of Insulin Lispro-aabc (Lyumjev) 100 UNIT/ML injection     She is needing this changed to a 30 day supply, sent to     [id number] - [place] [address] AT [facility] - [phone] [facility] [phone] FX

## 2020-12-15 RX ORDER — INSULIN LISPRO-AABC 100 [IU]/ML
INJECTION, SOLUTION INTRAVENOUS; SUBCUTANEOUS
Qty: 30 ML | Refills: 11 | Status: SHIPPED | OUTPATIENT
Start: 2020-12-15 | End: 2022-07-15

## 2020-12-29 ENCOUNTER — TELEPHONE (OUTPATIENT)
Dept: ENDOCRINOLOGY | Facility: CLINIC | Age: 20
End: 2020-12-29

## 2020-12-29 NOTE — TELEPHONE ENCOUNTER
Pt left a vm yesterday stating that she had some concerns and our office needed to contact her per her PCP, we tried to call and could not get in touch, she has left another VM now stating that she has been having constant diarrhea for the past week and a half, she went to [facility] and they gave her an IV to help with the dehydration, however her stools are completely mucus and bright yellow. She is wanting to know if this is something she needs to come in to have checked or if this is going to be normal now. She did not state any medication changes or why her PCP told her to contact us, as this sounds like it needs to go through them?

## 2020-12-29 NOTE — TELEPHONE ENCOUNTER
Spoke with pt. She was recently hospitalized for DKA. This is why PCP instructed her to call. Pt explained she switched insulins and ran out in between. She has the insulin now and sugars are in the 100s. She has ketone strips and is monitoring. Discussed keeping hydrated due to diarrhea. Pt stated electrolyte water and BRAT diet. Also, stated she will be tested for COVID tomorrow. Pt will call back if further questions arise.

## 2020-12-31 ENCOUNTER — NURSE TRIAGE (OUTPATIENT)
Dept: CALL CENTER | Facility: HOSPITAL | Age: 20
End: 2020-12-31

## 2021-01-03 ENCOUNTER — APPOINTMENT (OUTPATIENT)
Dept: GENERAL RADIOLOGY | Facility: HOSPITAL | Age: 21
End: 2021-01-03

## 2021-01-03 ENCOUNTER — HOSPITAL ENCOUNTER (EMERGENCY)
Facility: HOSPITAL | Age: 21
Discharge: HOME | End: 2021-01-03
Attending: EMERGENCY MEDICINE | Admitting: EMERGENCY MEDICINE

## 2021-01-03 VITALS
TEMPERATURE: 97.6 F | WEIGHT: 208.3 LBS | HEIGHT: 68 IN | SYSTOLIC BLOOD PRESSURE: 133 MMHG | RESPIRATION RATE: 20 BRPM | OXYGEN SATURATION: 98 % | DIASTOLIC BLOOD PRESSURE: 72 MMHG | BODY MASS INDEX: 31.57 KG/M2 | HEART RATE: 93 BPM

## 2021-01-03 DIAGNOSIS — R19.7 DIARRHEA OF PRESUMED INFECTIOUS ORIGIN: Primary | ICD-10-CM

## 2021-01-03 LAB
ACETONE BLD QL: NEGATIVE
ALBUMIN SERPL-MCNC: 3.6 G/DL (ref 3.5–5.2)
ALBUMIN/GLOB SERPL: 1.4 G/DL
ALP SERPL-CCNC: 91 U/L (ref 39–117)
ALT SERPL W P-5'-P-CCNC: 16 U/L (ref 1–33)
ANION GAP SERPL CALCULATED.3IONS-SCNC: 10 MMOL/L (ref 5–15)
AST SERPL-CCNC: 17 U/L (ref 1–32)
BASOPHILS # BLD AUTO: 0.04 10*3/MM3 (ref 0–0.2)
BASOPHILS NFR BLD AUTO: 0.4 % (ref 0–1.5)
BILIRUB SERPL-MCNC: 0.2 MG/DL (ref 0–1.2)
BILIRUB UR QL STRIP: NEGATIVE
BUN SERPL-MCNC: 5 MG/DL (ref 6–20)
BUN/CREAT SERPL: 11.6 (ref 7–25)
CALCIUM SPEC-SCNC: 8.8 MG/DL (ref 8.6–10.5)
CHLORIDE SERPL-SCNC: 104 MMOL/L (ref 98–107)
CLARITY UR: ABNORMAL
CO2 SERPL-SCNC: 25 MMOL/L (ref 22–29)
COLOR UR: YELLOW
CREAT SERPL-MCNC: 0.43 MG/DL (ref 0.57–1)
DEPRECATED RDW RBC AUTO: 42 FL (ref 37–54)
EOSINOPHIL # BLD AUTO: 0.07 10*3/MM3 (ref 0–0.4)
EOSINOPHIL NFR BLD AUTO: 0.8 % (ref 0.3–6.2)
ERYTHROCYTE [DISTWIDTH] IN BLOOD BY AUTOMATED COUNT: 13.2 % (ref 12.3–15.4)
GFR SERPL CREATININE-BSD FRML MDRD: >150 ML/MIN/1.73
GLOBULIN UR ELPH-MCNC: 2.6 GM/DL
GLUCOSE BLDC GLUCOMTR-MCNC: 74 MG/DL (ref 70–130)
GLUCOSE BLDC GLUCOMTR-MCNC: 94 MG/DL (ref 70–130)
GLUCOSE SERPL-MCNC: 123 MG/DL (ref 65–99)
GLUCOSE UR STRIP-MCNC: NEGATIVE MG/DL
HCG SERPL QL: NEGATIVE
HCT VFR BLD AUTO: 41.9 % (ref 34–46.6)
HGB BLD-MCNC: 13.9 G/DL (ref 12–15.9)
HGB UR QL STRIP.AUTO: NEGATIVE
IMM GRANULOCYTES # BLD AUTO: 0.02 10*3/MM3 (ref 0–0.05)
IMM GRANULOCYTES NFR BLD AUTO: 0.2 % (ref 0–0.5)
KETONES UR QL STRIP: ABNORMAL
LEUKOCYTE ESTERASE UR QL STRIP.AUTO: NEGATIVE
LIPASE SERPL-CCNC: 8 U/L (ref 13–60)
LYMPHOCYTES # BLD AUTO: 1.87 10*3/MM3 (ref 0.7–3.1)
LYMPHOCYTES NFR BLD AUTO: 20.3 % (ref 19.6–45.3)
MCH RBC QN AUTO: 28.9 PG (ref 26.6–33)
MCHC RBC AUTO-ENTMCNC: 33.2 G/DL (ref 31.5–35.7)
MCV RBC AUTO: 87.1 FL (ref 79–97)
MONOCYTES # BLD AUTO: 0.99 10*3/MM3 (ref 0.1–0.9)
MONOCYTES NFR BLD AUTO: 10.7 % (ref 5–12)
NEUTROPHILS NFR BLD AUTO: 6.24 10*3/MM3 (ref 1.7–7)
NEUTROPHILS NFR BLD AUTO: 67.6 % (ref 42.7–76)
NITRITE UR QL STRIP: NEGATIVE
NRBC BLD AUTO-RTO: 0 /100 WBC (ref 0–0.2)
PH UR STRIP.AUTO: 6 [PH] (ref 5–9)
PLATELET # BLD AUTO: 360 10*3/MM3 (ref 140–450)
PMV BLD AUTO: 9.5 FL (ref 6–12)
POTASSIUM SERPL-SCNC: 3.9 MMOL/L (ref 3.5–5.2)
PROT SERPL-MCNC: 6.2 G/DL (ref 6–8.5)
PROT UR QL STRIP: NEGATIVE
RBC # BLD AUTO: 4.81 10*6/MM3 (ref 3.77–5.28)
SODIUM SERPL-SCNC: 139 MMOL/L (ref 136–145)
SP GR UR STRIP: 1.01 (ref 1–1.03)
UROBILINOGEN UR QL STRIP: ABNORMAL
WBC # BLD AUTO: 9.23 10*3/MM3 (ref 3.4–10.8)

## 2021-01-03 PROCEDURE — 81003 URINALYSIS AUTO W/O SCOPE: CPT | Performed by: EMERGENCY MEDICINE

## 2021-01-03 PROCEDURE — 96374 THER/PROPH/DIAG INJ IV PUSH: CPT

## 2021-01-03 PROCEDURE — 83690 ASSAY OF LIPASE: CPT | Performed by: EMERGENCY MEDICINE

## 2021-01-03 PROCEDURE — 82962 GLUCOSE BLOOD TEST: CPT

## 2021-01-03 PROCEDURE — 74022 RADEX COMPL AQT ABD SERIES: CPT

## 2021-01-03 PROCEDURE — 82009 KETONE BODYS QUAL: CPT | Performed by: EMERGENCY MEDICINE

## 2021-01-03 PROCEDURE — 80053 COMPREHEN METABOLIC PANEL: CPT | Performed by: EMERGENCY MEDICINE

## 2021-01-03 PROCEDURE — 84703 CHORIONIC GONADOTROPIN ASSAY: CPT | Performed by: EMERGENCY MEDICINE

## 2021-01-03 PROCEDURE — 85025 COMPLETE CBC W/AUTO DIFF WBC: CPT | Performed by: EMERGENCY MEDICINE

## 2021-01-03 PROCEDURE — 96361 HYDRATE IV INFUSION ADD-ON: CPT

## 2021-01-03 PROCEDURE — 99283 EMERGENCY DEPT VISIT LOW MDM: CPT

## 2021-01-03 RX ORDER — DICYCLOMINE HYDROCHLORIDE 10 MG/1
10 CAPSULE ORAL 4 TIMES DAILY PRN
Qty: 4 CAPSULE | Refills: 0 | Status: SHIPPED | OUTPATIENT
Start: 2021-01-03 | End: 2022-07-15

## 2021-01-03 RX ORDER — DEXTROSE MONOHYDRATE 25 G/50ML
50 INJECTION, SOLUTION INTRAVENOUS
Status: DISCONTINUED | OUTPATIENT
Start: 2021-01-03 | End: 2021-01-03 | Stop reason: HOSPADM

## 2021-01-03 RX ORDER — LOPERAMIDE HYDROCHLORIDE 2 MG/1
2 CAPSULE ORAL 4 TIMES DAILY PRN
Qty: 6 CAPSULE | Refills: 0 | Status: SHIPPED | OUTPATIENT
Start: 2021-01-03 | End: 2022-07-15

## 2021-01-03 RX ORDER — SODIUM CHLORIDE 0.9 % (FLUSH) 0.9 %
10 SYRINGE (ML) INJECTION AS NEEDED
Status: DISCONTINUED | OUTPATIENT
Start: 2021-01-03 | End: 2021-01-03 | Stop reason: HOSPADM

## 2021-01-03 RX ORDER — ONDANSETRON 4 MG/1
4 TABLET, ORALLY DISINTEGRATING ORAL EVERY 8 HOURS PRN
Qty: 10 TABLET | Refills: 0 | Status: ON HOLD | OUTPATIENT
Start: 2021-01-03 | End: 2022-04-04 | Stop reason: SDUPTHER

## 2021-01-03 RX ORDER — SODIUM CHLORIDE 9 MG/ML
125 INJECTION, SOLUTION INTRAVENOUS CONTINUOUS
Status: DISCONTINUED | OUTPATIENT
Start: 2021-01-03 | End: 2021-01-03 | Stop reason: HOSPADM

## 2021-01-03 RX ORDER — AZITHROMYCIN 500 MG/1
500 TABLET, FILM COATED ORAL DAILY
Qty: 3 TABLET | Refills: 0 | Status: SHIPPED | OUTPATIENT
Start: 2021-01-03 | End: 2021-01-06

## 2021-01-03 RX ADMIN — DEXTROSE MONOHYDRATE 50 ML: 500 INJECTION PARENTERAL at 20:49

## 2021-01-03 RX ADMIN — SODIUM CHLORIDE 1000 ML: 900 INJECTION, SOLUTION INTRAVENOUS at 19:14

## 2021-01-04 NOTE — ED NOTES
Checked FSBS 94, pt wants to wait in the D50, plans to eat peanut butter and crackers and if sugar keeps going down, then she will take the D50.     Laura Maharaj RN  01/03/21 1957

## 2021-01-04 NOTE — DISCHARGE INSTRUCTIONS
Return ED fever, abdominal pain, vomiting, dehydration, bleeding, worse condition, any other concerns

## 2021-01-04 NOTE — ED PROVIDER NOTES
Subjective   21yo female pmh significant dm1 presents ED c/o 2wk hx nonbloody diarrheal stools x 8-12/day.  ROS (+) abdominal cramping.  Denies fever/chest pain/cough/soa/dysuria/hematuria/melena/hematochoezia/hematemesis/sick contact/antibiotic use.      History provided by:  Patient  Diarrhea  The primary symptoms include abdominal pain, nausea and diarrhea. Primary symptoms do not include fever or vomiting.   The illness is also significant for chills.       Review of Systems   Constitutional: Positive for chills. Negative for fever.   HENT: Negative.    Eyes: Negative for redness.   Cardiovascular: Negative.    Gastrointestinal: Positive for abdominal pain, diarrhea and nausea. Negative for blood in stool and vomiting.   Genitourinary: Negative.    Musculoskeletal: Negative.    Allergic/Immunologic: Negative for immunocompromised state.   All other systems reviewed and are negative.      Past Medical History:   Diagnosis Date   • Abdominal pain    • Acute bronchitis    • Acute pharyngitis    • Allergic rhinitis    • Asteatotic eczema    • Carbuncle of buttock    • Chalazion     - right upper and lower eyelids   • Conjunctivitis    • Constipation    • Contact dermatitis    • Diabetic ketoacidosis (CMS/HCC)     - history of   • Diarrhea    • DKA (diabetic ketoacidoses) (CMS/HCC)    • Esotropia    • Fatigue    • Folliculitis    • Hordeolum internum of right lower eyelid    • Influenza    • Laceration of skin of chin    • Nausea and vomiting    • Otitis media    • Tibial torsion      - left leg   • Type 1 diabetes mellitus (CMS/HCC)    • Vitamin D deficiency        Allergies   Allergen Reactions   • Cefprozil Hives       Past Surgical History:   Procedure Laterality Date   • CRYOTHERAPY  10/13/2010    acne   • OTHER SURGICAL HISTORY  05/04/2011    Remove Impacted Cerumen [id number]        Family History   Problem Relation Age of Onset   • Breast cancer Maternal Grandmother    • Hypertension Mother    • Asthma Sister    •  Heart disease Maternal Grandfather    • Colon cancer Neg Hx    • Endometrial cancer Neg Hx    • Ovarian cancer Neg Hx        Social History     Socioeconomic History   • Marital status:      Spouse name: Not on file   • Number of children: Not on file   • Years of education: Not on file   • Highest education level: Not on file   Tobacco Use   • Smoking status: Never Smoker   • Smokeless tobacco: Never Used   Substance and Sexual Activity   • Alcohol use: No   • Drug use: No   • Sexual activity: Never           Objective   Physical Exam  Vitals signs and nursing note reviewed.   Constitutional:       Appearance: Normal appearance.   HENT:      Head: Normocephalic and atraumatic.      Mouth/Throat:      Mouth: Mucous membranes are moist.   Eyes:      Pupils: Pupils are equal, round, and reactive to light.   Neck:      Musculoskeletal: Normal range of motion and neck supple. No neck rigidity.   Cardiovascular:      Rate and Rhythm: Normal rate and regular rhythm.      Pulses: Normal pulses.      Heart sounds: Normal heart sounds. No murmur. No friction rub. No gallop.    Pulmonary:      Effort: Pulmonary effort is normal. No respiratory distress.      Breath sounds: Normal breath sounds. No wheezing, rhonchi or rales.   Abdominal:      General: Bowel sounds are normal. There is no distension.      Palpations: Abdomen is soft.      Tenderness: There is no abdominal tenderness. There is no right CVA tenderness, left CVA tenderness, guarding or rebound.   Musculoskeletal: Normal range of motion.   Lymphadenopathy:      Cervical: No cervical adenopathy.   Skin:     General: Skin is warm and dry.   Neurological:      Mental Status: She is alert.         Procedures           ED Course      Labs Reviewed   COMPREHENSIVE METABOLIC PANEL - Abnormal; Notable for the following components:       Result Value    Glucose 123 (*)     BUN 5 (*)     Creatinine 0.43 (*)     All other components within normal limits    Narrative:      GFR Normal >60  Chronic Kidney Disease <60  Kidney Failure <15     LIPASE - Abnormal; Notable for the following components:    Lipase 8 (*)     All other components within normal limits   URINALYSIS W/ MICROSCOPIC IF INDICATED (NO CULTURE) - Abnormal; Notable for the following components:    Appearance, UA Cloudy (*)     Ketones, UA Trace (*)     All other components within normal limits    Narrative:     Urine microscopic not indicated.   CBC WITH AUTO DIFFERENTIAL - Abnormal; Notable for the following components:    Monocytes, Absolute 0.99 (*)     All other components within normal limits   HCG, SERUM, QUALITATIVE - Normal   ACETONE - Normal   POCT GLUCOSE FINGERSTICK - Normal   POCT GLUCOSE FINGERSTICK - Normal   GASTROINTESTINAL PANEL, PCR   CLOSTRIDIUM DIFFICILE TOXIN    Narrative:     The following orders were created for panel order Clostridium Difficile Toxin - Stool, Per Rectum.  Procedure                               Abnormality         Status                     ---------                               -----------         ------                     Clostridium Difficile To...[[id number]]                                                   Please view results for these tests on the individual orders.   CLOSTRIDIUM DIFFICILE TOXIN, PCR   PH, VENOUS   CBC AND DIFFERENTIAL    Narrative:     The following orders were created for panel order CBC & Differential.  Procedure                               Abnormality         Status                     ---------                               -----------         ------                     Scan Slide[[id number]]                                                                  CBC Auto Differential[[id number]]        Abnormal            Final result                 Please view results for these tests on the individual orders.     Xr Abdomen 2+ Vw With Chest 1 Vw    Result Date: 1/3/2021  Narrative: PROCEDURE: XR ABDOMEN 2+ VIEWS W CHEST 1 VW INDICATION:  Diarrhea,  abdominal pain COMPARISON VIEWS:  Chest x-ray dated 8/5/2019 FINDINGS: An acute abdominal series was performed by obtaining a frontal chest radiograph and two abdominal radiographs.  CHEST: Heart size: Within normal limits Mediastinal contour: Within normal limits Lungs: No evidence of focal air space disease. Mild streaky bibasilar opacity appears similar to previous study Pleura: No pleural fluid Osseous: Limited assessment of the osseous structures is unremarkable for age. ABDOMEN: Bowel gas pattern: Nonobstructive No gross evidence of organomegaly. There is no evidence of free intraperitoneal air. Osseous:  Limited assessment of the osseous structures is unremarkable for age.     Impression: No acute abnormality identified. Mild streaky bibasilar pulmonary opacities appear similar to previous study. Electronically signed by:  Laura Lemus MD  1/3/2021 8:19 PM CST Workstation: [id number]                                         MDM    Final diagnoses:   Diarrhea of presumed infectious origin            Yung Davila MD  01/03/21 [id number]

## 2021-01-05 ENCOUNTER — TELEPHONE (OUTPATIENT)
Dept: ENDOCRINOLOGY | Facility: CLINIC | Age: 21
End: 2021-01-05

## 2021-01-06 NOTE — TELEPHONE ENCOUNTER
I spoke with pt and she just said they she had been having low sugars and so she changed her basal to half and that has helped so that fixed her problem

## 2021-02-02 ENCOUNTER — TELEPHONE (OUTPATIENT)
Dept: ENDOCRINOLOGY | Facility: CLINIC | Age: 21
End: 2021-02-02

## 2021-02-02 NOTE — TELEPHONE ENCOUNTER
Pt left a vm asking to be switched back to pen insulin, says she is no longer going to be using the insulin pump.     [id number] - [place], [place] - [facility] [address]  AT [facility] [address] - [phone] [facility] [phone] FX

## 2021-02-03 ENCOUNTER — TELEPHONE (OUTPATIENT)
Dept: ENDOCRINOLOGY | Facility: CLINIC | Age: 21
End: 2021-02-03

## 2021-02-03 RX ORDER — FLURBIPROFEN SODIUM 0.3 MG/ML
SOLUTION/ DROPS OPHTHALMIC
Qty: 200 EACH | Refills: 10 | Status: SHIPPED | OUTPATIENT
Start: 2021-02-03 | End: 2022-07-15 | Stop reason: SDUPTHER

## 2021-02-03 RX ORDER — INSULIN LISPRO 200 [IU]/ML
25 INJECTION, SOLUTION SUBCUTANEOUS 3 TIMES DAILY
Qty: 6 PEN | Refills: 11 | Status: SHIPPED | OUTPATIENT
Start: 2021-02-03 | End: 2022-07-15

## 2021-02-03 RX ORDER — INSULIN GLARGINE 300 U/ML
42 INJECTION, SOLUTION SUBCUTANEOUS DAILY
Qty: 5 PEN | Refills: 11 | Status: SHIPPED | OUTPATIENT
Start: 2021-02-03 | End: 2022-07-15 | Stop reason: SDUPTHER

## 2021-02-03 NOTE — TELEPHONE ENCOUNTER
Pt needs to change from vials to injectors and has to have today is almost out and leaving town tomorrow.

## 2021-02-18 ENCOUNTER — TELEPHONE (OUTPATIENT)
Dept: ENDOCRINOLOGY | Facility: CLINIC | Age: 21
End: 2021-02-18

## 2021-02-18 NOTE — TELEPHONE ENCOUNTER
Pt wants to know why the Toujeo and the Humalog has been changed to Toujeo Max and now   Humalog U200 and that was different  And wants to know is the strength also different and how she might need to adjust accordingly was put back on pens because she was going off the pump.    Thank You

## 2021-04-22 ENCOUNTER — TELEPHONE (OUTPATIENT)
Dept: ENDOCRINOLOGY | Facility: CLINIC | Age: 21
End: 2021-04-22

## 2021-04-22 RX ORDER — URINE GLUCOSE-ACET TEST STRIP
STRIP MISCELLANEOUS
Qty: 50 EACH | Refills: 11 | Status: SHIPPED | OUTPATIENT
Start: 2021-04-22

## 2021-04-22 NOTE — TELEPHONE ENCOUNTER
Called and left a VM and let her know we didn't have samples and if she is still sick she should go back to the ER

## 2021-04-22 NOTE — TELEPHONE ENCOUNTER
Pt needs ketone strips been vomitting and  diurea  went urgent care  And cannot find ketone strips anywhere do we know where she could get them that would have them on hand or do we have samples [phone]

## 2021-04-26 ENCOUNTER — TELEPHONE (OUTPATIENT)
Dept: ENDOCRINOLOGY | Facility: CLINIC | Age: 21
End: 2021-04-26

## 2021-04-26 NOTE — TELEPHONE ENCOUNTER
Spoke w pt    Viral infection last week    She is doing better    Ketones, 2.9   Hydrate and achieve glucose control less than 180    If more than 5 , please go to ER

## 2021-04-26 NOTE — TELEPHONE ENCOUNTER
Pt left a vm needing a call back in regards to the [employer] messages she sent to both Dr Ellis and Gelacio. She says this is urgent. Please advise.

## 2021-05-10 ENCOUNTER — TELEPHONE (OUTPATIENT)
Dept: ENDOCRINOLOGY | Facility: CLINIC | Age: 21
End: 2021-05-10

## 2021-05-10 NOTE — TELEPHONE ENCOUNTER
Annie Reed with the transplant office at the [facility] left a vm requesting most recent labs and office notes on this patient and for a continuation of care.      [phone]  Fax [phone].

## 2021-06-08 ENCOUNTER — OFFICE VISIT (OUTPATIENT)
Dept: PULMONOLOGY | Facility: CLINIC | Age: 21
End: 2021-06-08

## 2021-06-08 ENCOUNTER — PROCEDURE VISIT (OUTPATIENT)
Dept: PULMONOLOGY | Facility: CLINIC | Age: 21
End: 2021-06-08

## 2021-06-08 VITALS
SYSTOLIC BLOOD PRESSURE: 130 MMHG | TEMPERATURE: 97 F | DIASTOLIC BLOOD PRESSURE: 78 MMHG | HEART RATE: 125 BPM | OXYGEN SATURATION: 98 % | BODY MASS INDEX: 30.77 KG/M2 | WEIGHT: 203 LBS | HEIGHT: 68 IN

## 2021-06-08 DIAGNOSIS — J45.20 MILD INTERMITTENT ASTHMA WITHOUT COMPLICATION: Primary | ICD-10-CM

## 2021-06-08 DIAGNOSIS — J45.20 MILD INTERMITTENT ASTHMA WITHOUT COMPLICATION: ICD-10-CM

## 2021-06-08 PROCEDURE — 99214 OFFICE O/P EST MOD 30 MIN: CPT | Performed by: INTERNAL MEDICINE

## 2021-06-08 PROCEDURE — 94060 EVALUATION OF WHEEZING: CPT | Performed by: INTERNAL MEDICINE

## 2021-06-08 RX ORDER — ACETAMINOPHEN AND CODEINE PHOSPHATE 120; 12 MG/5ML; MG/5ML
SOLUTION ORAL
COMMUNITY
Start: 2021-05-11 | End: 2023-02-01

## 2021-06-08 RX ORDER — ONDANSETRON 4 MG/1
4 TABLET, FILM COATED ORAL EVERY 8 HOURS PRN
COMMUNITY
Start: 2021-03-15

## 2021-06-08 NOTE — PROGRESS NOTES
Spirometry pre/post performed. 4 puffs Albuter given. Post spirometry performed 5-10 mins after bronchodilator given.     Good patient effort and cooperation.     Ordered by Dr. Millie Marti, read by Dr. Millie Marti.

## 2021-06-08 NOTE — PROGRESS NOTES
Pulmonary Office Follow-up    Subjective     Anna García is seen today at the office for   Chief Complaint   Patient presents with   • mild intermittent asthma without complication         HPI  Anna García is a 21 y.o. female with a PMH significant for asthma, type 1 diabetes mellitus, vitamin D deficiency, and GERD who presents for follow-up of asthma  She has been doing well from a pulmonary standpoint. She does get winded when her heart rate gets high, which has been an on going problem from her. She gest winded with physical activity  She has a rescue inhaler.which she rarely uses. Hasn' needed controller meds for almost a year    Tobacco use history:  none    Patient Active Problem List   Diagnosis   • Vitamin D deficiency   • Type 1 diabetes mellitus with hyperglycemia (CMS/[facility])   • Menorrhagia with regular cycle   • Recurrent vaginitis   • Tachycardia   • Palpitation   • Mild intermittent asthma without complication   • Class 1 obesity due to excess calories without serious comorbidity with body mass index (BMI) of 30.0 to 30.9 in adult         Medications, Allergies, Social, and Family Histories reviewed as per EMR.    Objective     Vitals:    06/08/21 1532   BP: 130/78   Pulse: (!) 125   Temp: 97 °F (36.1 °C)   SpO2: 98%         06/08/21  1532   Weight: 92.1 kg (203 lb)     [unfilled]  Physical Exam  Vitals reviewed.   Constitutional:       Appearance: Normal appearance.   HENT:      Head: Normocephalic and atraumatic.      Nose: Nose normal.      Mouth/Throat:      Mouth: Mucous membranes are moist.      Pharynx: Oropharynx is clear.   Eyes:      Conjunctiva/sclera: Conjunctivae normal.      Pupils: Pupils are equal, round, and reactive to light.   Cardiovascular:      Rate and Rhythm: Normal rate and regular rhythm.      Pulses: Normal pulses.      Heart sounds: Normal heart sounds.   Pulmonary:      Effort: Pulmonary effort is normal.      Breath sounds: Normal breath sounds.    Abdominal:      General: Abdomen is flat. Bowel sounds are normal.      Palpations: Abdomen is soft.   Musculoskeletal:         General: Normal range of motion.      Cervical back: Normal range of motion.   Skin:     General: Skin is warm and dry.   Neurological:      General: No focal deficit present.      Mental Status: She is alert and oriented to person, place, and time.   Psychiatric:         Mood and Affect: Mood normal.         Behavior: Behavior normal.           PFTs:   6/8/2021  FVC 3.55L, 81%  FEV1 2.78L, 73%  Ratio 80%  No bronchodilator response      8/5/19 (independently reviewed and interpreted by me)  Ratio 46  FVC 3.41/ 79%  FEV1 1.55/ 41%  TLC 4.28/ 75%  DLCO 32.21/ 108%  Severe obstruction with no significant bronchodilator response.  Borderline TLC.  Normal diffusing capacity.  No comparative data available.     Radiology (independently reviewed and interpreted by me): CXR 8/5/19 showed NACPD    Assessment/Plan     Diagnoses and all orders for this visit:    1. Mild intermittent asthma without complication (Primary)  -     Spirometry With Bronchodilator         Discussion/ Recommendations:   Giovanni today was normal without BDR. She doesn't have excessive use of her rescue inhaler which is good. We spent most of the appointment talking about her shortness of breath and all of the contributing factors. She has some issues with chronic dehydration which is not helping anything. I recommended looking into some sugar free electrolyte mixes (like Nuun, LMNT, etc) to see if that helps. Additionally just increasing her physical activity will help with cardiac conditioning. Can just start with daily walks and build from there.    Will see her back in 3 months         No follow-ups on file.          This document has been electronically signed by Millie Marti DO on June 8, 2021 15:41 CDT

## 2021-10-12 ENCOUNTER — HOSPITAL ENCOUNTER (EMERGENCY)
Facility: HOSPITAL | Age: 21
Discharge: HOME | End: 2021-10-12
Admitting: NURSE PRACTITIONER

## 2021-10-12 VITALS
SYSTOLIC BLOOD PRESSURE: 132 MMHG | RESPIRATION RATE: 18 BRPM | TEMPERATURE: 98.4 F | BODY MASS INDEX: 28.79 KG/M2 | WEIGHT: 190 LBS | HEIGHT: 68 IN | DIASTOLIC BLOOD PRESSURE: 84 MMHG | OXYGEN SATURATION: 100 % | HEART RATE: 80 BPM

## 2021-10-12 PROCEDURE — M0245 HC IV INFUSION, BAMLANIVIMAB AND ETESEVIMAB, 2100 MG: HCPCS | Performed by: NURSE PRACTITIONER

## 2021-10-12 PROCEDURE — 25010000002 INJECTION, BAMLANIVIMAB AND ETESEVIMAB, 2100 MG: Performed by: NURSE PRACTITIONER

## 2021-10-12 PROCEDURE — 99202 OFFICE O/P NEW SF 15 MIN: CPT

## 2021-10-12 RX ORDER — EPINEPHRINE 1 MG/ML
0.3 INJECTION, SOLUTION INTRAMUSCULAR; SUBCUTANEOUS ONCE AS NEEDED
Status: DISCONTINUED | OUTPATIENT
Start: 2021-10-12 | End: 2021-10-12 | Stop reason: HOSPADM

## 2021-10-12 RX ORDER — DIPHENHYDRAMINE HYDROCHLORIDE 50 MG/ML
50 INJECTION INTRAMUSCULAR; INTRAVENOUS ONCE AS NEEDED
Status: DISCONTINUED | OUTPATIENT
Start: 2021-10-12 | End: 2021-10-12 | Stop reason: HOSPADM

## 2021-10-12 RX ORDER — SODIUM CHLORIDE 9 MG/ML
30 INJECTION, SOLUTION INTRAVENOUS ONCE
Status: COMPLETED | OUTPATIENT
Start: 2021-10-12 | End: 2021-10-12

## 2021-10-12 RX ORDER — METHYLPREDNISOLONE SODIUM SUCCINATE 125 MG/2ML
125 INJECTION, POWDER, LYOPHILIZED, FOR SOLUTION INTRAMUSCULAR; INTRAVENOUS ONCE AS NEEDED
Status: DISCONTINUED | OUTPATIENT
Start: 2021-10-12 | End: 2021-10-12 | Stop reason: HOSPADM

## 2021-10-12 RX ORDER — DIPHENHYDRAMINE HCL 50 MG
50 CAPSULE ORAL ONCE AS NEEDED
Status: DISCONTINUED | OUTPATIENT
Start: 2021-10-12 | End: 2021-10-12 | Stop reason: HOSPADM

## 2021-10-12 RX ADMIN — SODIUM CHLORIDE: 9 INJECTION, SOLUTION INTRAVENOUS at 08:52

## 2021-10-12 RX ADMIN — SODIUM CHLORIDE 30 ML: 9 INJECTION, SOLUTION INTRAVENOUS at 09:15

## 2022-04-03 ENCOUNTER — APPOINTMENT (OUTPATIENT)
Dept: CT IMAGING | Facility: HOSPITAL | Age: 22
End: 2022-04-03

## 2022-04-03 ENCOUNTER — HOSPITAL ENCOUNTER (OUTPATIENT)
Facility: HOSPITAL | Age: 22
Setting detail: OBSERVATION
Discharge: HOME | End: 2022-04-04
Attending: STUDENT IN AN ORGANIZED HEALTH CARE EDUCATION/TRAINING PROGRAM | Admitting: FAMILY MEDICINE

## 2022-04-03 DIAGNOSIS — K59.00 CONSTIPATION, UNSPECIFIED CONSTIPATION TYPE: ICD-10-CM

## 2022-04-03 DIAGNOSIS — R11.10 INTRACTABLE VOMITING: Primary | ICD-10-CM

## 2022-04-03 LAB
ACETONE BLD QL: ABNORMAL
ALBUMIN SERPL-MCNC: 4.3 G/DL (ref 3.5–5.2)
ALBUMIN/GLOB SERPL: 1.2 G/DL
ALP SERPL-CCNC: 111 U/L (ref 39–117)
ALT SERPL W P-5'-P-CCNC: 10 U/L (ref 1–33)
ANION GAP SERPL CALCULATED.3IONS-SCNC: 15 MMOL/L (ref 5–15)
AST SERPL-CCNC: 14 U/L (ref 1–32)
BACTERIA UR QL AUTO: ABNORMAL /HPF
BASOPHILS # BLD AUTO: 0.05 10*3/MM3 (ref 0–0.2)
BASOPHILS NFR BLD AUTO: 0.5 % (ref 0–1.5)
BILIRUB SERPL-MCNC: 0.3 MG/DL (ref 0–1.2)
BILIRUB UR QL STRIP: NEGATIVE
BUN SERPL-MCNC: 12 MG/DL (ref 6–20)
BUN/CREAT SERPL: 18.5 (ref 7–25)
CALCIUM SPEC-SCNC: 9.3 MG/DL (ref 8.6–10.5)
CHLORIDE SERPL-SCNC: 101 MMOL/L (ref 98–107)
CLARITY UR: CLEAR
CO2 SERPL-SCNC: 19 MMOL/L (ref 22–29)
COLOR UR: YELLOW
CREAT SERPL-MCNC: 0.65 MG/DL (ref 0.57–1)
DEPRECATED RDW RBC AUTO: 39.7 FL (ref 37–54)
EGFRCR SERPLBLD CKD-EPI 2021: 127.8 ML/MIN/1.73
EOSINOPHIL # BLD AUTO: 0.01 10*3/MM3 (ref 0–0.4)
EOSINOPHIL NFR BLD AUTO: 0.1 % (ref 0.3–6.2)
ERYTHROCYTE [DISTWIDTH] IN BLOOD BY AUTOMATED COUNT: 12.5 % (ref 12.3–15.4)
FLUAV RNA RESP QL NAA+PROBE: NOT DETECTED
FLUBV RNA RESP QL NAA+PROBE: NOT DETECTED
GLOBULIN UR ELPH-MCNC: 3.5 GM/DL
GLUCOSE SERPL-MCNC: 217 MG/DL (ref 65–99)
GLUCOSE UR STRIP-MCNC: ABNORMAL MG/DL
HCG INTACT+B SERPL-ACNC: <0.1 MIU/ML
HCT VFR BLD AUTO: 46.4 % (ref 34–46.6)
HGB BLD-MCNC: 16.2 G/DL (ref 12–15.9)
HGB UR QL STRIP.AUTO: NEGATIVE
HYALINE CASTS UR QL AUTO: ABNORMAL /LPF
IMM GRANULOCYTES # BLD AUTO: 0.04 10*3/MM3 (ref 0–0.05)
IMM GRANULOCYTES NFR BLD AUTO: 0.4 % (ref 0–0.5)
KETONES UR QL STRIP: ABNORMAL
LEUKOCYTE ESTERASE UR QL STRIP.AUTO: NEGATIVE
LIPASE SERPL-CCNC: 13 U/L (ref 13–60)
LYMPHOCYTES # BLD AUTO: 1.38 10*3/MM3 (ref 0.7–3.1)
LYMPHOCYTES NFR BLD AUTO: 12.4 % (ref 19.6–45.3)
MAGNESIUM SERPL-MCNC: 1.3 MG/DL (ref 1.6–2.6)
MAGNESIUM SERPL-MCNC: 1.6 MG/DL (ref 1.6–2.6)
MCH RBC QN AUTO: 30.6 PG (ref 26.6–33)
MCHC RBC AUTO-ENTMCNC: 34.9 G/DL (ref 31.5–35.7)
MCV RBC AUTO: 87.7 FL (ref 79–97)
MONOCYTES # BLD AUTO: 0.56 10*3/MM3 (ref 0.1–0.9)
MONOCYTES NFR BLD AUTO: 5 % (ref 5–12)
NEUTROPHILS NFR BLD AUTO: 81.6 % (ref 42.7–76)
NEUTROPHILS NFR BLD AUTO: 9.05 10*3/MM3 (ref 1.7–7)
NITRITE UR QL STRIP: NEGATIVE
NRBC BLD AUTO-RTO: 0 /100 WBC (ref 0–0.2)
PH UR STRIP.AUTO: 5.5 [PH] (ref 5–9)
PLATELET # BLD AUTO: 423 10*3/MM3 (ref 140–450)
PMV BLD AUTO: 9.7 FL (ref 6–12)
POTASSIUM SERPL-SCNC: 3 MMOL/L (ref 3.5–5.2)
POTASSIUM SERPL-SCNC: 3.4 MMOL/L (ref 3.5–5.2)
PROT SERPL-MCNC: 7.8 G/DL (ref 6–8.5)
PROT UR QL STRIP: ABNORMAL
RBC # BLD AUTO: 5.29 10*6/MM3 (ref 3.77–5.28)
RBC # UR STRIP: ABNORMAL /HPF
REF LAB TEST METHOD: ABNORMAL
SARS-COV-2 RNA RESP QL NAA+PROBE: NOT DETECTED
SODIUM SERPL-SCNC: 135 MMOL/L (ref 136–145)
SP GR UR STRIP: 1.04 (ref 1–1.03)
SQUAMOUS #/AREA URNS HPF: ABNORMAL /HPF
UROBILINOGEN UR QL STRIP: ABNORMAL
WBC # UR STRIP: ABNORMAL /HPF
WBC NRBC COR # BLD: 11.09 10*3/MM3 (ref 3.4–10.8)

## 2022-04-03 PROCEDURE — 83735 ASSAY OF MAGNESIUM: CPT | Performed by: FAMILY MEDICINE

## 2022-04-03 PROCEDURE — 87636 SARSCOV2 & INF A&B AMP PRB: CPT | Performed by: STUDENT IN AN ORGANIZED HEALTH CARE EDUCATION/TRAINING PROGRAM

## 2022-04-03 PROCEDURE — 25010000002 ONDANSETRON PER 1 MG: Performed by: STUDENT IN AN ORGANIZED HEALTH CARE EDUCATION/TRAINING PROGRAM

## 2022-04-03 PROCEDURE — 96376 TX/PRO/DX INJ SAME DRUG ADON: CPT

## 2022-04-03 PROCEDURE — 25010000002 ONDANSETRON PER 1 MG: Performed by: FAMILY MEDICINE

## 2022-04-03 PROCEDURE — 96361 HYDRATE IV INFUSION ADD-ON: CPT

## 2022-04-03 PROCEDURE — 83690 ASSAY OF LIPASE: CPT | Performed by: STUDENT IN AN ORGANIZED HEALTH CARE EDUCATION/TRAINING PROGRAM

## 2022-04-03 PROCEDURE — 63710000001 INSULIN DETEMIR PER 5 UNITS: Performed by: FAMILY MEDICINE

## 2022-04-03 PROCEDURE — G0378 HOSPITAL OBSERVATION PER HR: HCPCS

## 2022-04-03 PROCEDURE — 81001 URINALYSIS AUTO W/SCOPE: CPT | Performed by: STUDENT IN AN ORGANIZED HEALTH CARE EDUCATION/TRAINING PROGRAM

## 2022-04-03 PROCEDURE — 85025 COMPLETE CBC W/AUTO DIFF WBC: CPT | Performed by: STUDENT IN AN ORGANIZED HEALTH CARE EDUCATION/TRAINING PROGRAM

## 2022-04-03 PROCEDURE — 82962 GLUCOSE BLOOD TEST: CPT

## 2022-04-03 PROCEDURE — 84132 ASSAY OF SERUM POTASSIUM: CPT | Performed by: FAMILY MEDICINE

## 2022-04-03 PROCEDURE — 25010000002 IOPAMIDOL 61 % SOLUTION: Performed by: FAMILY MEDICINE

## 2022-04-03 PROCEDURE — 25010000002 MAGNESIUM SULFATE 2 GM/50ML SOLUTION: Performed by: FAMILY MEDICINE

## 2022-04-03 PROCEDURE — 96375 TX/PRO/DX INJ NEW DRUG ADDON: CPT

## 2022-04-03 PROCEDURE — 74177 CT ABD & PELVIS W/CONTRAST: CPT

## 2022-04-03 PROCEDURE — 25010000002 PROCHLORPERAZINE 10 MG/2ML SOLUTION: Performed by: FAMILY MEDICINE

## 2022-04-03 PROCEDURE — 80053 COMPREHEN METABOLIC PANEL: CPT | Performed by: STUDENT IN AN ORGANIZED HEALTH CARE EDUCATION/TRAINING PROGRAM

## 2022-04-03 PROCEDURE — 83735 ASSAY OF MAGNESIUM: CPT | Performed by: STUDENT IN AN ORGANIZED HEALTH CARE EDUCATION/TRAINING PROGRAM

## 2022-04-03 PROCEDURE — 82009 KETONE BODYS QUAL: CPT | Performed by: STUDENT IN AN ORGANIZED HEALTH CARE EDUCATION/TRAINING PROGRAM

## 2022-04-03 PROCEDURE — 96374 THER/PROPH/DIAG INJ IV PUSH: CPT

## 2022-04-03 PROCEDURE — 84702 CHORIONIC GONADOTROPIN TEST: CPT | Performed by: STUDENT IN AN ORGANIZED HEALTH CARE EDUCATION/TRAINING PROGRAM

## 2022-04-03 PROCEDURE — 96366 THER/PROPH/DIAG IV INF ADDON: CPT

## 2022-04-03 PROCEDURE — 99285 EMERGENCY DEPT VISIT HI MDM: CPT

## 2022-04-03 PROCEDURE — 63710000001 INSULIN ASPART PER 5 UNITS: Performed by: FAMILY MEDICINE

## 2022-04-03 PROCEDURE — C9803 HOPD COVID-19 SPEC COLLECT: HCPCS

## 2022-04-03 PROCEDURE — 96365 THER/PROPH/DIAG IV INF INIT: CPT

## 2022-04-03 RX ORDER — ALBUTEROL SULFATE 90 UG/1
2 AEROSOL, METERED RESPIRATORY (INHALATION) EVERY 4 HOURS PRN
Status: DISCONTINUED | OUTPATIENT
Start: 2022-04-03 | End: 2022-04-04 | Stop reason: HOSPADM

## 2022-04-03 RX ORDER — ONDANSETRON 2 MG/ML
4 INJECTION INTRAMUSCULAR; INTRAVENOUS EVERY 6 HOURS PRN
Status: DISCONTINUED | OUTPATIENT
Start: 2022-04-03 | End: 2022-04-04 | Stop reason: HOSPADM

## 2022-04-03 RX ORDER — ACETAMINOPHEN 160 MG/5ML
650 SOLUTION ORAL EVERY 4 HOURS PRN
Status: DISCONTINUED | OUTPATIENT
Start: 2022-04-03 | End: 2022-04-04 | Stop reason: HOSPADM

## 2022-04-03 RX ORDER — DEXTROSE MONOHYDRATE 25 G/50ML
25 INJECTION, SOLUTION INTRAVENOUS
Status: DISCONTINUED | OUTPATIENT
Start: 2022-04-03 | End: 2022-04-04 | Stop reason: HOSPADM

## 2022-04-03 RX ORDER — ONDANSETRON 4 MG/1
4 TABLET, FILM COATED ORAL EVERY 6 HOURS PRN
Status: DISCONTINUED | OUTPATIENT
Start: 2022-04-03 | End: 2022-04-04 | Stop reason: HOSPADM

## 2022-04-03 RX ORDER — POLYETHYLENE GLYCOL 3350 17 G/17G
17 POWDER, FOR SOLUTION ORAL DAILY PRN
Status: DISCONTINUED | OUTPATIENT
Start: 2022-04-03 | End: 2022-04-04 | Stop reason: HOSPADM

## 2022-04-03 RX ORDER — LACTULOSE 10 G/15ML
20 SOLUTION ORAL 3 TIMES DAILY
Status: DISCONTINUED | OUTPATIENT
Start: 2022-04-03 | End: 2022-04-04

## 2022-04-03 RX ORDER — AMOXICILLIN 250 MG
2 CAPSULE ORAL 2 TIMES DAILY
Status: DISCONTINUED | OUTPATIENT
Start: 2022-04-03 | End: 2022-04-04 | Stop reason: HOSPADM

## 2022-04-03 RX ORDER — MAGNESIUM SULFATE HEPTAHYDRATE 40 MG/ML
4 INJECTION, SOLUTION INTRAVENOUS AS NEEDED
Status: DISCONTINUED | OUTPATIENT
Start: 2022-04-03 | End: 2022-04-04 | Stop reason: HOSPADM

## 2022-04-03 RX ORDER — NICOTINE POLACRILEX 4 MG
15 LOZENGE BUCCAL
Status: DISCONTINUED | OUTPATIENT
Start: 2022-04-03 | End: 2022-04-04 | Stop reason: HOSPADM

## 2022-04-03 RX ORDER — MAGNESIUM SULFATE HEPTAHYDRATE 40 MG/ML
2 INJECTION, SOLUTION INTRAVENOUS AS NEEDED
Status: DISCONTINUED | OUTPATIENT
Start: 2022-04-03 | End: 2022-04-04 | Stop reason: HOSPADM

## 2022-04-03 RX ORDER — ACETAMINOPHEN 325 MG/1
650 TABLET ORAL EVERY 4 HOURS PRN
Status: DISCONTINUED | OUTPATIENT
Start: 2022-04-03 | End: 2022-04-04 | Stop reason: HOSPADM

## 2022-04-03 RX ORDER — BISACODYL 10 MG
10 SUPPOSITORY, RECTAL RECTAL DAILY PRN
Status: DISCONTINUED | OUTPATIENT
Start: 2022-04-03 | End: 2022-04-04 | Stop reason: HOSPADM

## 2022-04-03 RX ORDER — ONDANSETRON 2 MG/ML
4 INJECTION INTRAMUSCULAR; INTRAVENOUS ONCE
Status: COMPLETED | OUTPATIENT
Start: 2022-04-03 | End: 2022-04-03

## 2022-04-03 RX ORDER — POTASSIUM CHLORIDE 1.5 G/1.77G
40 POWDER, FOR SOLUTION ORAL AS NEEDED
Status: DISCONTINUED | OUTPATIENT
Start: 2022-04-03 | End: 2022-04-04 | Stop reason: HOSPADM

## 2022-04-03 RX ORDER — BISACODYL 5 MG/1
5 TABLET, DELAYED RELEASE ORAL DAILY PRN
Status: DISCONTINUED | OUTPATIENT
Start: 2022-04-03 | End: 2022-04-04 | Stop reason: HOSPADM

## 2022-04-03 RX ORDER — POTASSIUM CHLORIDE 750 MG/1
40 CAPSULE, EXTENDED RELEASE ORAL AS NEEDED
Status: DISCONTINUED | OUTPATIENT
Start: 2022-04-03 | End: 2022-04-04 | Stop reason: HOSPADM

## 2022-04-03 RX ORDER — ACETAMINOPHEN 650 MG/1
650 SUPPOSITORY RECTAL EVERY 4 HOURS PRN
Status: DISCONTINUED | OUTPATIENT
Start: 2022-04-03 | End: 2022-04-04 | Stop reason: HOSPADM

## 2022-04-03 RX ORDER — SODIUM CHLORIDE 0.9 % (FLUSH) 0.9 %
10 SYRINGE (ML) INJECTION EVERY 12 HOURS SCHEDULED
Status: DISCONTINUED | OUTPATIENT
Start: 2022-04-03 | End: 2022-04-04 | Stop reason: HOSPADM

## 2022-04-03 RX ORDER — SODIUM CHLORIDE 0.9 % (FLUSH) 0.9 %
10 SYRINGE (ML) INJECTION AS NEEDED
Status: DISCONTINUED | OUTPATIENT
Start: 2022-04-03 | End: 2022-04-04 | Stop reason: HOSPADM

## 2022-04-03 RX ORDER — PROCHLORPERAZINE EDISYLATE 5 MG/ML
10 INJECTION INTRAMUSCULAR; INTRAVENOUS EVERY 6 HOURS PRN
Status: DISCONTINUED | OUTPATIENT
Start: 2022-04-03 | End: 2022-04-04 | Stop reason: HOSPADM

## 2022-04-03 RX ORDER — POTASSIUM CHLORIDE 7.45 MG/ML
10 INJECTION INTRAVENOUS
Status: DISCONTINUED | OUTPATIENT
Start: 2022-04-03 | End: 2022-04-04 | Stop reason: HOSPADM

## 2022-04-03 RX ORDER — SODIUM CHLORIDE 9 MG/ML
150 INJECTION, SOLUTION INTRAVENOUS CONTINUOUS
Status: DISCONTINUED | OUTPATIENT
Start: 2022-04-03 | End: 2022-04-04 | Stop reason: HOSPADM

## 2022-04-03 RX ORDER — CALCIUM CARBONATE 200(500)MG
2 TABLET,CHEWABLE ORAL 2 TIMES DAILY PRN
Status: DISCONTINUED | OUTPATIENT
Start: 2022-04-03 | End: 2022-04-04 | Stop reason: HOSPADM

## 2022-04-03 RX ORDER — POTASSIUM CHLORIDE 750 MG/1
40 CAPSULE, EXTENDED RELEASE ORAL ONCE
Status: COMPLETED | OUTPATIENT
Start: 2022-04-03 | End: 2022-04-03

## 2022-04-03 RX ORDER — LANOLIN ALCOHOL/MO/W.PET/CERES
5 CREAM (GRAM) TOPICAL NIGHTLY PRN
Status: DISCONTINUED | OUTPATIENT
Start: 2022-04-03 | End: 2022-04-04 | Stop reason: HOSPADM

## 2022-04-03 RX ADMIN — LACTULOSE 20 G: 20 SOLUTION ORAL at 20:13

## 2022-04-03 RX ADMIN — SODIUM CHLORIDE 200 ML/HR: 9 INJECTION, SOLUTION INTRAVENOUS at 20:14

## 2022-04-03 RX ADMIN — SODIUM CHLORIDE, POTASSIUM CHLORIDE, SODIUM LACTATE AND CALCIUM CHLORIDE 500 ML: 600; 310; 30; 20 INJECTION, SOLUTION INTRAVENOUS at 09:48

## 2022-04-03 RX ADMIN — ONDANSETRON 4 MG: 2 INJECTION INTRAMUSCULAR; INTRAVENOUS at 05:39

## 2022-04-03 RX ADMIN — MAGNESIUM SULFATE HEPTAHYDRATE 2 G: 2 INJECTION, SOLUTION INTRAVENOUS at 21:31

## 2022-04-03 RX ADMIN — SERTRALINE 50 MG: 50 TABLET, FILM COATED ORAL at 20:13

## 2022-04-03 RX ADMIN — SODIUM CHLORIDE 200 ML/HR: 9 INJECTION, SOLUTION INTRAVENOUS at 16:33

## 2022-04-03 RX ADMIN — POTASSIUM CHLORIDE 40 MEQ: 750 CAPSULE, EXTENDED RELEASE ORAL at 06:38

## 2022-04-03 RX ADMIN — INSULIN DETEMIR 40 UNITS: 100 INJECTION, SOLUTION SUBCUTANEOUS at 20:48

## 2022-04-03 RX ADMIN — SODIUM CHLORIDE 200 ML/HR: 9 INJECTION, SOLUTION INTRAVENOUS at 16:30

## 2022-04-03 RX ADMIN — IOPAMIDOL 90 ML: 612 INJECTION, SOLUTION INTRAVENOUS at 08:50

## 2022-04-03 RX ADMIN — SODIUM CHLORIDE, POTASSIUM CHLORIDE, SODIUM LACTATE AND CALCIUM CHLORIDE 1000 ML: 600; 310; 30; 20 INJECTION, SOLUTION INTRAVENOUS at 05:39

## 2022-04-03 RX ADMIN — MAGNESIUM SULFATE HEPTAHYDRATE 2 G: 2 INJECTION, SOLUTION INTRAVENOUS at 19:42

## 2022-04-03 RX ADMIN — Medication 10 ML: at 20:16

## 2022-04-03 RX ADMIN — PROCHLORPERAZINE EDISYLATE 10 MG: 5 INJECTION INTRAMUSCULAR; INTRAVENOUS at 20:54

## 2022-04-03 RX ADMIN — MAGNESIUM SULFATE HEPTAHYDRATE 2 G: 2 INJECTION, SOLUTION INTRAVENOUS at 18:08

## 2022-04-03 RX ADMIN — DOCUSATE SODIUM 50 MG AND SENNOSIDES 8.6 MG 2 TABLET: 8.6; 5 TABLET, FILM COATED ORAL at 20:13

## 2022-04-03 RX ADMIN — SODIUM CHLORIDE, POTASSIUM CHLORIDE, SODIUM LACTATE AND CALCIUM CHLORIDE 500 ML: 600; 310; 30; 20 INJECTION, SOLUTION INTRAVENOUS at 07:49

## 2022-04-03 RX ADMIN — ONDANSETRON 4 MG: 2 INJECTION INTRAMUSCULAR; INTRAVENOUS at 10:30

## 2022-04-03 RX ADMIN — ONDANSETRON 4 MG: 2 INJECTION INTRAMUSCULAR; INTRAVENOUS at 16:24

## 2022-04-03 RX ADMIN — INSULIN ASPART 4 UNITS: 100 INJECTION, SOLUTION INTRAVENOUS; SUBCUTANEOUS at 16:31

## 2022-04-03 RX ADMIN — POTASSIUM CHLORIDE 40 MEQ: 750 CAPSULE, EXTENDED RELEASE ORAL at 18:09

## 2022-04-03 RX ADMIN — PROCHLORPERAZINE EDISYLATE 10 MG: 5 INJECTION INTRAMUSCULAR; INTRAVENOUS at 07:59

## 2022-04-03 RX ADMIN — Medication 10 ML: at 20:15

## 2022-04-03 NOTE — NURSING NOTE
Patient arrived to floor from ER and has a high heart rate. BANDAR Marques, charge nurse, called Dr. Vega for high heart rate and is starting her iv fluids. No new orders placed at this time.

## 2022-04-03 NOTE — H&P
[facility] Admission      Date of Admission: 4/3/2022      Primary Care Physician: Maricruz Davis APRN      Chief Complaint: Nausea and vomiting    HPI: Patient is a 22-year-old type I diabetic who presented to the emergency department due to a 2 to 3-day history of worsening nausea and vomiting.  Patient notes that she had been having some difficulties with controlling her sugars and tested positive for ketones at home so she presented to the emergency department due to concerns for being possibly in DKA.  Patient denies feeling like she has had difficulties with constipation was not had a bowel movement in some time.  She denies any fevers or chills.  She reports compliance with her home insulin dosing and even states she had been adjusting her dosing to account for her sugars at home.  She denies any other current concerns.  In the emergency department her labs showed mildly elevated glucose but normal anion gap.  She did test positive for ketones.    Concurrent Medical History:  has a past medical history of Abdominal pain, Acute bronchitis, Acute pharyngitis, Allergic rhinitis, Asteatotic eczema, Carbuncle of buttock, Chalazion, Conjunctivitis, Constipation, Contact dermatitis, Diabetic ketoacidosis (HCC), Diarrhea, DKA (diabetic ketoacidoses), Esotropia, Fatigue, Folliculitis, Hordeolum internum of right lower eyelid, Influenza, Laceration of skin of chin, Nausea and vomiting, Otitis media, Tibial torsion, Type 1 diabetes mellitus (HCC), and Vitamin D deficiency.    Past Surgical History:  has a past surgical history that includes Cryotherapy (10/13/2010) and Other surgical history (05/04/2011).    Family History: family history includes Asthma in her sister; Breast cancer in her maternal grandmother; Heart disease in her maternal grandfather; Hypertension in her mother. No changes    Social History:  reports that she has never smoked. She has never used  smokeless tobacco. She reports that she does not drink alcohol and does not use drugs.    Allergies:   Allergies   Allergen Reactions   • Cefprozil Hives       Medications:   Prior to Admission medications    Medication Sig Start Date End Date Taking? Authorizing Provider   albuterol (PROAIR RESPICLICK) 108 (90 Base) MCG/ACT inhaler Inhale 2 puffs Every 4 (Four) Hours As Needed for Wheezing or Shortness of Air. 9/9/20  Yes Jocelin Vergara MD   Blood Glucose/Ketone Monitor device Use as indicated 11/23/18  Yes Mariano Fulton MD   Glucagon (Baqsimi One Pack) 3 MG/DOSE powder 1 each into the nostril(s) as directed by provider As Needed (Hypoglycemia). Apply intranasal if hypoglycemia 12/3/20  Yes Mariano Fulton MD   glucose blood (ACCU-CHEK SONA PLUS) test strip 200 each by Other route 6 (Six) Times a Day. Use as instructed 10/2/17  Yes Mariano Fulton MD   glucose blood test strip Testing 4 times daily, DX E10.65 6/3/19  Yes Gelacio Vergara APRN   Insulin Glargine, 2 Unit Dial, (Toujeo Max SoloStar) 300 UNIT/ML solution pen-injector injection Inject 42 Units under the skin into the appropriate area as directed Daily. 2/3/21  Yes Gelacio Vergara APRN   Insulin Lispro-aabc (Lyumjev) 100 UNIT/ML injection 35 units three times daily, provide a 30 day supply 12/15/20  Yes Mariano Fulton MD   Insulin Pen Needle (B-D UF III MINI PEN NEEDLES) 31G X 5 MM misc USE 4-6 TIMES PER DAY 2/3/21  Yes Gelacio Vergara APRN   ondansetron ODT (ZOFRAN-ODT) 4 MG disintegrating tablet Take 1 tablet by mouth Every 8 (Eight) Hours As Needed for Nausea or Vomiting. 11/23/18  Yes Mariano Fulton MD   ONE TOUCH ULTRA TEST test strip TEST BLOOD SUGAR 6 TIMES DAILY AS DIRECTED 3/2/20  Yes Gelacio Vergara APRN   PRECISION XTRA strip CHECK AS NEEDED WHEN BLOOD SUGAR ABOVE 250 4/16/20  Yes Gelacio Vergara APRN   sertraline (ZOLOFT) 50 MG tablet Take 50 mg  by mouth Daily. 3/26/20  Yes Karen Gonzalez MD TILIA FE 1-20/1-30/1-35 MG-MCG tablet TAKE 1 TABLET BY MOUTH DAILY 12/23/19  Yes Dorcas Roberson APRN   Urine Glucose-Ketones Test (Keto-Diastix) strip Test if BG greater than 250 4/22/21  Yes Mariano Fulton MD   clotrimazole-betamethasone (LOTRISONE) 1-0.05 % cream Apply  topically 2 (Two) Times a Day As Needed (itching). 3/6/18   Dorcas Roberson APRN   dicyclomine (BENTYL) 10 MG capsule Take 1 capsule by mouth 4 (Four) Times a Day As Needed (abdominal cramps). 1/3/21   Yung Davila MD   fluconazole (DIFLUCAN) 150 MG tablet Take 1 tablet by mouth today and repeat in 4 days if symptoms still present. 8/1/18   Dorcas Roberson APRN   Insulin Lispro (HumaLOG KwikPen) 200 UNIT/ML solution pen-injector Inject 25 Units under the skin into the appropriate area as directed 3 (Three) Times a Day. 2/3/21   Gelacio Vergara APRN   loperamide (IMODIUM) 2 MG capsule Take 1 capsule by mouth 4 (Four) Times a Day As Needed for Diarrhea (8). 1/3/21   Yung Davila MD   norethindrone (MICRONOR) 0.35 MG tablet  5/11/21   Karen Gonzalez MD   ondansetron (ZOFRAN) 4 MG tablet  3/15/21   Karen Gonzalez MD   ondansetron ODT (ZOFRAN-ODT) 4 MG disintegrating tablet Place 1 tablet on the tongue Every 8 (Eight) Hours As Needed for Nausea or Vomiting. 1/3/21   Yung Davila MD       Review of Systems:  Review of Systems   Constitutional: Positive for activity change and appetite change. Negative for chills and fever.   HENT: Negative for congestion.    Respiratory: Negative for shortness of breath.    Cardiovascular: Negative for chest pain and palpitations.   Gastrointestinal: Positive for constipation, nausea and vomiting. Negative for abdominal pain and diarrhea.   Genitourinary: Negative.    Musculoskeletal: Negative.    Skin: Negative.    Neurological: Negative.    Psychiatric/Behavioral: Negative.    All other systems reviewed and are  negative.     Otherwise complete ROS is negative except as mentioned above.    Physical Exam:   Temp:  [98.7 °F (37.1 °C)] 98.7 °F (37.1 °C)  Heart Rate:  [] 122  Resp:  [16-20] 20  BP: (115-138)/(63-86) 115/67  Physical Exam  Constitutional:       General: She is not in acute distress.     Appearance: She is not toxic-appearing.   HENT:      Head: Normocephalic and atraumatic.      Right Ear: External ear normal.      Left Ear: External ear normal.      Mouth/Throat:      Mouth: Mucous membranes are dry.      Pharynx: Oropharynx is clear.   Eyes:      Conjunctiva/sclera: Conjunctivae normal.   Cardiovascular:      Rate and Rhythm: Regular rhythm. Tachycardia present.      Pulses: Normal pulses.      Heart sounds: Normal heart sounds.   Pulmonary:      Effort: Pulmonary effort is normal.      Breath sounds: Normal breath sounds.   Abdominal:      General: Bowel sounds are normal.      Palpations: Abdomen is soft.      Tenderness: There is abdominal tenderness.   Musculoskeletal:         General: No swelling.      Cervical back: Neck supple.   Skin:     General: Skin is warm and dry.      Capillary Refill: Capillary refill takes less than 2 seconds.   Neurological:      General: No focal deficit present.      Mental Status: She is alert and oriented to person, place, and time. Mental status is at baseline.   Psychiatric:         Behavior: Behavior normal.         Thought Content: Thought content normal.           Results Reviewed:  I have personally reviewed current lab, radiology, and data and agree with results.  Lab Results (last 24 hours)     Procedure Component Value Units Date/Time    Urinalysis, Microscopic Only - Urine, Clean Catch [[id number]]  (Abnormal) Collected: 04/03/22 0751    Specimen: Urine, Clean Catch Updated: 04/03/22 0803     RBC, UA 0-2 /HPF      WBC, UA 0-2 /HPF      Bacteria, UA None Seen /HPF      Squamous Epithelial Cells, UA 0-2 /HPF      Hyaline Casts, UA 0-2 /LPF      Methodology  Automated Microscopy    Urinalysis With Microscopic If Indicated (No Culture) - Urine, Clean Catch [[id number]]  (Abnormal) Collected: 04/03/22 0751    Specimen: Urine, Clean Catch Updated: 04/03/22 0800     Color, UA Yellow     Appearance, UA Clear     pH, UA 5.5     Specific Gravity, UA 1.038     Comment: Result obtained by Refractometer        Glucose, UA >=1000 mg/dL (3+)     Ketones, UA >=160 mg/dL (4+)     Bilirubin, UA Negative     Blood, UA Negative     Protein, UA 30 mg/dL (1+)     Leuk Esterase, UA Negative     Nitrite, UA Negative     Urobilinogen, UA 1.0 E.U./dL    Acetone [[id number]]  (Abnormal) Collected: 04/03/22 0535    Specimen: Blood Updated: 04/03/22 0743     Acetone Small    COVID-19 and FLU A/B PCR - Swab, Nasopharynx [[id number]]  (Normal) Collected: 04/03/22 0612    Specimen: Swab from Nasopharynx Updated: 04/03/22 0652     COVID19 Not Detected     Influenza A PCR Not Detected     Influenza B PCR Not Detected    Narrative:      Fact sheet for providers: https://www.fda.gov/media/055726/download    Fact sheet for patients: https://www.fda.gov/media/354219/download    Test performed by PCR.    hCG, Quantitative, Pregnancy [[id number]] Collected: 04/03/22 0535    Specimen: Blood Updated: 04/03/22 0622     HCG Quantitative <0.10 mIU/mL     Narrative:      HCG Ranges by Gestational Age    Females - non-pregnant premenopausal   </= 1mIU/mL HCG  Females - postmenopausal               </= 7mIU/mL HCG    3 Weeks         5.8 -    71.2 mIU/mL  4 Weeks         9.5 -     750 mIU/mL  5 Weeks         217 -   7,138 mIU/mL  6 Weeks         158 -  31,795 mIU/mL  7 Weeks       3,697 - 163,563 mIU/mL  8 Weeks      32,065 - 149,571 mIU/mL  9 Weeks      63,803 - 151,410 mIU/mL  10 Weeks     46,509 - 186,977 mIU/mL  12 Weeks     27,832 - 210,612 mIU/mL  14 Weeks     13,950 -  62,530 mIU/mL  15 Weeks     12,039 -  70,971 mIU/mL  16 Weeks      9,040 -  56,451 mIU/mL  17 Weeks      8,175 -  55,868 mIU/mL  18 Weeks       8,099 -  58,176 mIU/mL  Results may be falsely decreased if patient taking Biotin.      Comprehensive Metabolic Panel [[id number]]  (Abnormal) Collected: 04/03/22 0535    Specimen: Blood Updated: 04/03/22 0620     Glucose 217 mg/dL      BUN 12 mg/dL      Creatinine 0.65 mg/dL      Sodium 135 mmol/L      Potassium 3.0 mmol/L      Comment: Slight hemolysis detected by analyzer. Results may be affected.        Chloride 101 mmol/L      CO2 19.0 mmol/L      Calcium 9.3 mg/dL      Total Protein 7.8 g/dL      Albumin 4.30 g/dL      ALT (SGPT) 10 U/L      AST (SGOT) 14 U/L      Alkaline Phosphatase 111 U/L      Total Bilirubin 0.3 mg/dL      Globulin 3.5 gm/dL      A/G Ratio 1.2 g/dL      BUN/Creatinine Ratio 18.5     Anion Gap 15.0 mmol/L      eGFR 127.8 mL/min/1.73      Comment: National Kidney Foundation and American Society of Nephrology (ASN) Task Force recommended calculation based on the Chronic Kidney Disease Epidemiology Collaboration (CKD-EPI) equation refit without adjustment for race.       Narrative:      GFR Normal >60  Chronic Kidney Disease <60  Kidney Failure <15      Lipase [[id number]]  (Normal) Collected: 04/03/22 0535    Specimen: Blood Updated: 04/03/22 0617     Lipase 13 U/L     Magnesium [[id number]]  (Normal) Collected: 04/03/22 0535    Specimen: Blood Updated: 04/03/22 0617     Magnesium 1.6 mg/dL     CBC & Differential [[id number]]  (Abnormal) Collected: 04/03/22 0535    Specimen: Blood Updated: 04/03/22 0546    Narrative:      The following orders were created for panel order CBC & Differential.  Procedure                               Abnormality         Status                     ---------                               -----------         ------                     CBC Auto Differential[[id number]]        Abnormal            Final result                 Please view results for these tests on the individual orders.    CBC Auto Differential [[id number]]  (Abnormal) Collected: 04/03/22 0535    Specimen:  Blood Updated: 04/03/22 0546     WBC 11.09 10*3/mm3      RBC 5.29 10*6/mm3      Hemoglobin 16.2 g/dL      Hematocrit 46.4 %      MCV 87.7 fL      MCH 30.6 pg      MCHC 34.9 g/dL      RDW 12.5 %      RDW-SD 39.7 fl      MPV 9.7 fL      Platelets 423 10*3/mm3      Neutrophil % 81.6 %      Lymphocyte % 12.4 %      Monocyte % 5.0 %      Eosinophil % 0.1 %      Basophil % 0.5 %      Immature Grans % 0.4 %      Neutrophils, Absolute 9.05 10*3/mm3      Lymphocytes, Absolute 1.38 10*3/mm3      Monocytes, Absolute 0.56 10*3/mm3      Eosinophils, Absolute 0.01 10*3/mm3      Basophils, Absolute 0.05 10*3/mm3      Immature Grans, Absolute 0.04 10*3/mm3      nRBC 0.0 /100 WBC         Imaging Results (Last 24 Hours)     Procedure Component Value Units Date/Time    CT Abdomen Pelvis With Contrast [[id number]] Collected: 04/03/22 0840     Updated: 04/03/22 1054    Narrative:      CT ABDOMEN PELVIS WITH CONTRAST    INDICATION: Nausea/vomiting    COMPARISON: None    TECHNIQUE: Spiral CT images were acquired of the abdomen and  pelvis with multiplanar reconstructions.    CONTRAST: 90mL Isovue 300 IV    FINDINGS:  INFERIOR THORAX: Unremarkable  LIVER: Focal fatty infiltration along the ligamentum teres.  GALLBLADDER: Unremarkable  SPLEEN: Unremarkable  PANCREAS: Unremarkable  ADRENAL GLANDS: Unremarkable  KIDNEYS: Unremarkable  URETERS: Unremarkable  BLADDER: Unremarkable  REPRODUCTIVE: Right ovarian follicle. Tampon.  GASTROINTESTINAL: Large volume of stool in the rectum with 8 cm  fecalith. there is high density material within the gastric wall  or lumen. Unremarkable appendix. Numerous fluid-filled loops of  small bowel which can be seen in gastroenteritis.   VASCULAR: Unremarkable  LYMPH NODES: No lymphadenopathy per CT criteria.  PERITONEUM/RETROPERITONEUM: Unremarkable.   OSSEOUS STRUCTURES: Unremarkable  SOFT TISSUES: Unremarkable      Impression:      1. Large volume of stool in the rectum with 8 cm fecalith.  2. High density  material within gastric wall or lumen.  Correlation with recent barium administration is recommended.  Alternatively, this could represent artifactual calcification of  the gastric mucosa such as with antihypertensive or calcium  supplement administration or much less likely gastric neoplasm.  3. Numerous fluid-filled loops of small bowel which can be seen  in gastroenteritis.    Electronically signed by:  Liudmila Pedro MD  4/3/2022 10:52 AM CDT  Workstation: [id number]            Assessment:    Active Hospital Problems    Diagnosis    • Intractable vomiting    • Constipation    • Type 1 diabetes mellitus with hyperglycemia (HCC)    Hypokalemia          Plan:  -Patient will be admitted for ongoing care  -At this time she does not appear to be clinically in DKA especially given her normal anion gap on her labs  -Her electrolytes also appear to be normal and her blood glucose is only 217.  -She is hypokalemic thus electrolyte replacement protocol will be ordered  -We will continue with subcutaneous insulin at this time with Levemir 40 units at night and high-dose sliding scale along with glucose checks  -She will be continued on a full liquid diet for now  -Her CT did show fecalith and some constipation so we will start her on oral lactulose as well as bowel regimen to help as this is likely the cause of her current symptoms since she does not appear to be in DKA  -We will continue with IV fluids with normal saline at 200 cc/h for now  -Tachycardia is likely secondary to some mild volume depletion related to her recent nausea and vomiting  -Home medications will otherwise be continued as appropriate  -DVT prophylaxis not indicated given low risk factors  -CODE STATUS: Full    I confirmed that the patient's Advance Care Plan is present, code status is documented, or surrogate decision maker is listed in the patient's medical record.     I have utilized all available immediate resources to obtain, update, or review the  patient's current medications.     I discussed the patient's findings and my recommendations with: The patient and  at bedside    Jay Vega MD

## 2022-04-03 NOTE — ED NOTES
Pt c/o nausea and vomiting. Pt states that her glucometer detected ketones on Friday. Nausea started Friday. Vomiting started about an hour ago.

## 2022-04-03 NOTE — PLAN OF CARE
Goal Outcome Evaluation:  Plan of Care Reviewed With: patient        Progress: no change  Outcome Evaluation: new admit this shift; no vomitting at this time but does have some nausea; replacing mag and kcl; will cont to monitor.

## 2022-04-03 NOTE — ED PROVIDER NOTES
Subjective   22-year-old female history of type 1 insulin-dependent type 1 diabetes comes to the ER 1 day history of nausea and vomiting.  Patient reports for the last couple days her ketones have been running high and blood sugars been high as well.  Today her blood sugar was 300-400.  Patient does have a history of DKA.  She reports that when she started throwing up she knew there is nothing more she could do so she came to the ER to make sure she is not in DKA.  She has been taking her insulin to bring her blood sugar down.      History provided by:  Patient   used: No        Review of Systems   Constitutional: Negative for chills and fever.   HENT: Negative for drooling.    Eyes: Negative for redness.   Respiratory: Negative for shortness of breath.    Cardiovascular: Negative for chest pain.   Gastrointestinal: Positive for nausea and vomiting. Negative for abdominal pain, constipation and diarrhea.   Genitourinary: Negative for flank pain.   Skin: Negative for color change.   Neurological: Negative for seizures.   Psychiatric/Behavioral: Negative for confusion.       Past Medical History:   Diagnosis Date   • Abdominal pain    • Acute bronchitis    • Acute pharyngitis    • Allergic rhinitis    • Asteatotic eczema    • Carbuncle of buttock    • Chalazion     - right upper and lower eyelids   • Conjunctivitis    • Constipation    • Contact dermatitis    • Diabetic ketoacidosis (HCC)     - history of   • Diarrhea    • DKA (diabetic ketoacidoses)    • Esotropia    • Fatigue    • Folliculitis    • Hordeolum internum of right lower eyelid    • Influenza    • Laceration of skin of chin    • Nausea and vomiting    • Otitis media    • Tibial torsion      - left leg   • Type 1 diabetes mellitus (HCC)    • Vitamin D deficiency        Allergies   Allergen Reactions   • Cefprozil Hives       Past Surgical History:   Procedure Laterality Date   • CRYOTHERAPY  10/13/2010    acne   • OTHER SURGICAL HISTORY   05/04/2011    Remove Impacted Cerumen 68189        Family History   Problem Relation Age of Onset   • Breast cancer Maternal Grandmother    • Hypertension Mother    • Asthma Sister    • Heart disease Maternal Grandfather    • Colon cancer Neg Hx    • Endometrial cancer Neg Hx    • Ovarian cancer Neg Hx        Social History     Socioeconomic History   • Marital status:    Tobacco Use   • Smoking status: Never Smoker   • Smokeless tobacco: Never Used   Substance and Sexual Activity   • Alcohol use: No   • Drug use: No   • Sexual activity: Never           Objective   Physical Exam  Vitals and nursing note reviewed.   Constitutional:       General: She is not in acute distress.     Appearance: She is well-developed. She is not ill-appearing, toxic-appearing or diaphoretic.   HENT:      Head: Normocephalic.      Right Ear: External ear normal.      Left Ear: External ear normal.   Cardiovascular:      Rate and Rhythm: Tachycardia present.   Pulmonary:      Effort: Pulmonary effort is normal. No accessory muscle usage or respiratory distress.   Chest:      Chest wall: No tenderness.   Abdominal:      Palpations: Abdomen is soft.      Tenderness: There is no abdominal tenderness (deep palpation).   Skin:     General: Skin is warm and dry.      Capillary Refill: Capillary refill takes less than 2 seconds.   Neurological:      Mental Status: She is alert and oriented to person, place, and time.   Psychiatric:         Behavior: Behavior normal.         Procedures           ED Course  ED Course as of 04/03/22 1106   Sun Apr 03, 2022   0642 Patient checked out to Dr. Spencer. [BH]   1104 Results were discussed with patient.  Dr. hutson was called who accepted patient. [MO]      ED Course User Index  [BH] Santhosh Hill MD  [MO] Tonio Spencer MD      Results for orders placed or performed during the hospital encounter of 04/03/22   COVID-19 and FLU A/B PCR - Swab, Nasopharynx    Specimen: Nasopharynx; Swab    Result Value Ref Range    COVID19 Not Detected Not Detected - Ref. Range    Influenza A PCR Not Detected Not Detected    Influenza B PCR Not Detected Not Detected   Comprehensive Metabolic Panel    Specimen: Blood   Result Value Ref Range    Glucose 217 (H) 65 - 99 mg/dL    BUN 12 6 - 20 mg/dL    Creatinine 0.65 0.57 - 1.00 mg/dL    Sodium 135 (L) 136 - 145 mmol/L    Potassium 3.0 (L) 3.5 - 5.2 mmol/L    Chloride 101 98 - 107 mmol/L    CO2 19.0 (L) 22.0 - 29.0 mmol/L    Calcium 9.3 8.6 - 10.5 mg/dL    Total Protein 7.8 6.0 - 8.5 g/dL    Albumin 4.30 3.50 - 5.20 g/dL    ALT (SGPT) 10 1 - 33 U/L    AST (SGOT) 14 1 - 32 U/L    Alkaline Phosphatase 111 39 - 117 U/L    Total Bilirubin 0.3 0.0 - 1.2 mg/dL    Globulin 3.5 gm/dL    A/G Ratio 1.2 g/dL    BUN/Creatinine Ratio 18.5 7.0 - 25.0    Anion Gap 15.0 5.0 - 15.0 mmol/L    eGFR 127.8 >60.0 mL/min/1.73   hCG, Quantitative, Pregnancy    Specimen: Blood   Result Value Ref Range    HCG Quantitative <0.10 mIU/mL   Lipase    Specimen: Blood   Result Value Ref Range    Lipase 13 13 - 60 U/L   Urinalysis With Microscopic If Indicated (No Culture) - Urine, Clean Catch    Specimen: Urine, Clean Catch   Result Value Ref Range    Color, UA Yellow Yellow, Straw, Dark Yellow, Violet    Appearance, UA Clear Clear    pH, UA 5.5 5.0 - 9.0    Specific Gravity, UA 1.038 (H) 1.003 - 1.030    Glucose, UA >=1000 mg/dL (3+) (A) Negative    Ketones, UA >=160 mg/dL (4+) (A) Negative    Bilirubin, UA Negative Negative    Blood, UA Negative Negative    Protein, UA 30 mg/dL (1+) (A) Negative    Leuk Esterase, UA Negative Negative    Nitrite, UA Negative Negative    Urobilinogen, UA 1.0 E.U./dL 0.2 - 1.0 E.U./dL   Magnesium    Specimen: Blood   Result Value Ref Range    Magnesium 1.6 1.6 - 2.6 mg/dL   CBC Auto Differential    Specimen: Blood   Result Value Ref Range    WBC 11.09 (H) 3.40 - 10.80 10*3/mm3    RBC 5.29 (H) 3.77 - 5.28 10*6/mm3    Hemoglobin 16.2 (H) 12.0 - 15.9 g/dL    Hematocrit  46.4 34.0 - 46.6 %    MCV 87.7 79.0 - 97.0 fL    MCH 30.6 26.6 - 33.0 pg    MCHC 34.9 31.5 - 35.7 g/dL    RDW 12.5 12.3 - 15.4 %    RDW-SD 39.7 37.0 - 54.0 fl    MPV 9.7 6.0 - 12.0 fL    Platelets 423 140 - 450 10*3/mm3    Neutrophil % 81.6 (H) 42.7 - 76.0 %    Lymphocyte % 12.4 (L) 19.6 - 45.3 %    Monocyte % 5.0 5.0 - 12.0 %    Eosinophil % 0.1 (L) 0.3 - 6.2 %    Basophil % 0.5 0.0 - 1.5 %    Immature Grans % 0.4 0.0 - 0.5 %    Neutrophils, Absolute 9.05 (H) 1.70 - 7.00 10*3/mm3    Lymphocytes, Absolute 1.38 0.70 - 3.10 10*3/mm3    Monocytes, Absolute 0.56 0.10 - 0.90 10*3/mm3    Eosinophils, Absolute 0.01 0.00 - 0.40 10*3/mm3    Basophils, Absolute 0.05 0.00 - 0.20 10*3/mm3    Immature Grans, Absolute 0.04 0.00 - 0.05 10*3/mm3    nRBC 0.0 0.0 - 0.2 /100 WBC   Acetone    Specimen: Blood   Result Value Ref Range    Acetone Small (A) Negative   Urinalysis, Microscopic Only - Urine, Clean Catch    Specimen: Urine, Clean Catch   Result Value Ref Range    RBC, UA 0-2 (A) None Seen /HPF    WBC, UA 0-2 None Seen, 0-2, 3-5 /HPF    Bacteria, UA None Seen None Seen /HPF    Squamous Epithelial Cells, UA 0-2 None Seen, 0-2 /HPF    Hyaline Casts, UA 0-2 None Seen /LPF    Methodology Automated Microscopy               MDM    Final diagnoses:   Intractable vomiting   Constipation, unspecified constipation type       ED Disposition  ED Disposition     ED Disposition   Decision to Admit    Condition   --    Comment   Level of Care: Telemetry [5]   Diagnosis: Intractable vomiting [[id number]]   Admitting Physician: DEAN ARORA [[id number]]   Attending Physician: DEAN ARORA [[id number]]               No follow-up provider specified.       Medication List      No changes were made to your prescriptions during this visit.          Tonio Spencer MD  04/03/22 3842

## 2022-04-04 ENCOUNTER — APPOINTMENT (OUTPATIENT)
Dept: GENERAL RADIOLOGY | Facility: HOSPITAL | Age: 22
End: 2022-04-04

## 2022-04-04 VITALS
TEMPERATURE: 98.1 F | HEART RATE: 95 BPM | OXYGEN SATURATION: 100 % | DIASTOLIC BLOOD PRESSURE: 74 MMHG | BODY MASS INDEX: 28.4 KG/M2 | RESPIRATION RATE: 18 BRPM | SYSTOLIC BLOOD PRESSURE: 117 MMHG | WEIGHT: 187.4 LBS | HEIGHT: 68 IN

## 2022-04-04 LAB
ANION GAP SERPL CALCULATED.3IONS-SCNC: 10 MMOL/L (ref 5–15)
BASOPHILS # BLD AUTO: 0.02 10*3/MM3 (ref 0–0.2)
BASOPHILS NFR BLD AUTO: 0.3 % (ref 0–1.5)
BUN SERPL-MCNC: 4 MG/DL (ref 6–20)
BUN/CREAT SERPL: 9.3 (ref 7–25)
CALCIUM SPEC-SCNC: 7.7 MG/DL (ref 8.6–10.5)
CHLORIDE SERPL-SCNC: 110 MMOL/L (ref 98–107)
CO2 SERPL-SCNC: 16 MMOL/L (ref 22–29)
CREAT SERPL-MCNC: 0.43 MG/DL (ref 0.57–1)
DEPRECATED RDW RBC AUTO: 39.1 FL (ref 37–54)
EGFRCR SERPLBLD CKD-EPI 2021: 141.2 ML/MIN/1.73
EOSINOPHIL # BLD AUTO: 0 10*3/MM3 (ref 0–0.4)
EOSINOPHIL NFR BLD AUTO: 0 % (ref 0.3–6.2)
ERYTHROCYTE [DISTWIDTH] IN BLOOD BY AUTOMATED COUNT: 12.7 % (ref 12.3–15.4)
GLUCOSE SERPL-MCNC: 111 MG/DL (ref 65–99)
HCT VFR BLD AUTO: 35.6 % (ref 34–46.6)
HGB BLD-MCNC: 12.6 G/DL (ref 12–15.9)
IMM GRANULOCYTES # BLD AUTO: 0.03 10*3/MM3 (ref 0–0.05)
IMM GRANULOCYTES NFR BLD AUTO: 0.4 % (ref 0–0.5)
LYMPHOCYTES # BLD AUTO: 0.63 10*3/MM3 (ref 0.7–3.1)
LYMPHOCYTES NFR BLD AUTO: 9.2 % (ref 19.6–45.3)
MAGNESIUM SERPL-MCNC: 2.1 MG/DL (ref 1.6–2.6)
MCH RBC QN AUTO: 30.2 PG (ref 26.6–33)
MCHC RBC AUTO-ENTMCNC: 35.4 G/DL (ref 31.5–35.7)
MCV RBC AUTO: 85.4 FL (ref 79–97)
MONOCYTES # BLD AUTO: 0.55 10*3/MM3 (ref 0.1–0.9)
MONOCYTES NFR BLD AUTO: 8 % (ref 5–12)
NEUTROPHILS NFR BLD AUTO: 5.63 10*3/MM3 (ref 1.7–7)
NEUTROPHILS NFR BLD AUTO: 82.1 % (ref 42.7–76)
NRBC BLD AUTO-RTO: 0 /100 WBC (ref 0–0.2)
PLATELET # BLD AUTO: 295 10*3/MM3 (ref 140–450)
PMV BLD AUTO: 9.9 FL (ref 6–12)
POTASSIUM SERPL-SCNC: 3.2 MMOL/L (ref 3.5–5.2)
POTASSIUM SERPL-SCNC: 3.7 MMOL/L (ref 3.5–5.2)
RBC # BLD AUTO: 4.17 10*6/MM3 (ref 3.77–5.28)
SODIUM SERPL-SCNC: 136 MMOL/L (ref 136–145)
WBC NRBC COR # BLD: 6.86 10*3/MM3 (ref 3.4–10.8)
WHOLE BLOOD HOLD SPECIMEN: NORMAL

## 2022-04-04 PROCEDURE — 74018 RADEX ABDOMEN 1 VIEW: CPT

## 2022-04-04 PROCEDURE — 83735 ASSAY OF MAGNESIUM: CPT | Performed by: FAMILY MEDICINE

## 2022-04-04 PROCEDURE — G0378 HOSPITAL OBSERVATION PER HR: HCPCS

## 2022-04-04 PROCEDURE — 84132 ASSAY OF SERUM POTASSIUM: CPT | Performed by: FAMILY MEDICINE

## 2022-04-04 PROCEDURE — 85025 COMPLETE CBC W/AUTO DIFF WBC: CPT | Performed by: FAMILY MEDICINE

## 2022-04-04 PROCEDURE — 80048 BASIC METABOLIC PNL TOTAL CA: CPT | Performed by: FAMILY MEDICINE

## 2022-04-04 PROCEDURE — 96376 TX/PRO/DX INJ SAME DRUG ADON: CPT

## 2022-04-04 PROCEDURE — 82962 GLUCOSE BLOOD TEST: CPT

## 2022-04-04 PROCEDURE — 25010000002 ONDANSETRON PER 1 MG: Performed by: FAMILY MEDICINE

## 2022-04-04 PROCEDURE — 96361 HYDRATE IV INFUSION ADD-ON: CPT

## 2022-04-04 RX ORDER — ONDANSETRON 4 MG/1
4 TABLET, ORALLY DISINTEGRATING ORAL EVERY 8 HOURS PRN
Qty: 20 TABLET | Refills: 0 | Status: SHIPPED | OUTPATIENT
Start: 2022-04-04 | End: 2022-07-15

## 2022-04-04 RX ORDER — LACTULOSE 10 G/15ML
20 SOLUTION ORAL 3 TIMES DAILY PRN
Status: DISCONTINUED | OUTPATIENT
Start: 2022-04-04 | End: 2022-04-04 | Stop reason: HOSPADM

## 2022-04-04 RX ADMIN — POTASSIUM CHLORIDE 40 MEQ: 750 CAPSULE, EXTENDED RELEASE ORAL at 12:19

## 2022-04-04 RX ADMIN — POTASSIUM CHLORIDE 40 MEQ: 750 CAPSULE, EXTENDED RELEASE ORAL at 00:56

## 2022-04-04 RX ADMIN — DOCUSATE SODIUM 50 MG AND SENNOSIDES 8.6 MG 2 TABLET: 8.6; 5 TABLET, FILM COATED ORAL at 08:36

## 2022-04-04 RX ADMIN — ONDANSETRON 4 MG: 2 INJECTION INTRAMUSCULAR; INTRAVENOUS at 01:16

## 2022-04-04 RX ADMIN — SODIUM CHLORIDE 200 ML/HR: 9 INJECTION, SOLUTION INTRAVENOUS at 05:42

## 2022-04-04 RX ADMIN — POTASSIUM CHLORIDE 40 MEQ: 750 CAPSULE, EXTENDED RELEASE ORAL at 08:36

## 2022-04-04 RX ADMIN — SODIUM CHLORIDE 200 ML/HR: 9 INJECTION, SOLUTION INTRAVENOUS at 00:57

## 2022-04-04 NOTE — PROGRESS NOTES
[facility] Medicine Services  INPATIENT PROGRESS NOTE    Length of Stay: 0  Date of Admission: 4/3/2022  Primary Care Physician: Maricruz Davis APRN    Subjective   Chief Complaint: Nausea vomiting  HPI: Patient reports improvement in her nausea today.  She states it is still present but not as severe.  She notes that she is also been having loose bowel movements without the aid of the lactulose.    Review of Systems   Constitutional: Negative for chills and fever.   HENT: Negative for congestion.    Respiratory: Negative for shortness of breath.    Cardiovascular: Negative for chest pain and palpitations.   Gastrointestinal: Positive for diarrhea and nausea. Negative for abdominal pain.   Genitourinary: Negative.    Musculoskeletal: Negative.    Skin: Negative.    Neurological: Negative.    Psychiatric/Behavioral: Negative.    All other systems reviewed and are negative.     All pertinent negatives and positives are as above. All other systems have been reviewed and are negative unless otherwise stated.     Objective    Temp:  [96.9 °F (36.1 °C)-98.1 °F (36.7 °C)] 96.9 °F (36.1 °C)  Heart Rate:  [] 83  Resp:  [16-20] 16  BP: (108-143)/(58-73) 108/68    Physical Exam  Constitutional:       General: She is not in acute distress.     Appearance: She is not toxic-appearing.   HENT:      Head: Normocephalic and atraumatic.      Right Ear: External ear normal.      Left Ear: External ear normal.      Nose: Nose normal.      Mouth/Throat:      Mouth: Mucous membranes are moist.      Pharynx: Oropharynx is clear.   Eyes:      Conjunctiva/sclera: Conjunctivae normal.   Cardiovascular:      Rate and Rhythm: Normal rate and regular rhythm.      Pulses: Normal pulses.      Heart sounds: Normal heart sounds.   Pulmonary:      Effort: Pulmonary effort is normal. No respiratory distress.      Breath sounds: Normal breath sounds.   Abdominal:      General: Bowel sounds are normal.       Palpations: Abdomen is soft.      Tenderness: There is no abdominal tenderness.   Musculoskeletal:         General: No swelling.      Cervical back: Neck supple.   Skin:     General: Skin is warm and dry.      Capillary Refill: Capillary refill takes less than 2 seconds.   Neurological:      General: No focal deficit present.      Mental Status: She is alert and oriented to person, place, and time. Mental status is at baseline.   Psychiatric:         Thought Content: Thought content normal.         Results Review:  I have reviewed the labs, radiology results, and diagnostic studies.    Laboratory Data:   Results from last 7 days   Lab Units 04/04/22  0530 04/03/22  1603 04/03/22  0535   SODIUM mmol/L 136  --  135*   POTASSIUM mmol/L 3.2* 3.4* 3.0*   CHLORIDE mmol/L 110*  --  101   CO2 mmol/L 16.0*  --  19.0*   BUN mg/dL 4*  --  12   CREATININE mg/dL 0.43*  --  0.65   GLUCOSE mg/dL 111*  --  217*   CALCIUM mg/dL 7.7*  --  9.3   BILIRUBIN mg/dL  --   --  0.3   ALK PHOS U/L  --   --  111   ALT (SGPT) U/L  --   --  10   AST (SGOT) U/L  --   --  14   ANION GAP mmol/L 10.0  --  15.0     Estimated Creatinine Clearance: 234.2 mL/min (A) (by C-G formula based on SCr of 0.43 mg/dL (L)).  Results from last 7 days   Lab Units 04/04/22  0530 04/03/22  1603 04/03/22  0535   MAGNESIUM mg/dL 2.1 1.3* 1.6         Results from last 7 days   Lab Units 04/04/22  0530 04/03/22  0535   WBC 10*3/mm3 6.86 11.09*   HEMOGLOBIN g/dL 12.6 16.2*   HEMATOCRIT % 35.6 46.4   PLATELETS 10*3/mm3 295 423           Culture Data:   No results found for: BLOODCX  No results found for: URINECX  No results found for: RESPCX  No results found for: WOUNDCX  No results found for: STOOLCX  No components found for: BODYFLD    Radiology Data:   Imaging Results (Last 24 Hours)     Procedure Component Value Units Date/Time    CT Abdomen Pelvis With Contrast [[id number]] Collected: 04/03/22 0840     Updated: 04/03/22 1054    Narrative:      CT ABDOMEN PELVIS  WITH CONTRAST    INDICATION: Nausea/vomiting    COMPARISON: None    TECHNIQUE: Spiral CT images were acquired of the abdomen and  pelvis with multiplanar reconstructions.    CONTRAST: 90mL Isovue 300 IV    FINDINGS:  INFERIOR THORAX: Unremarkable  LIVER: Focal fatty infiltration along the ligamentum teres.  GALLBLADDER: Unremarkable  SPLEEN: Unremarkable  PANCREAS: Unremarkable  ADRENAL GLANDS: Unremarkable  KIDNEYS: Unremarkable  URETERS: Unremarkable  BLADDER: Unremarkable  REPRODUCTIVE: Right ovarian follicle. Tampon.  GASTROINTESTINAL: Large volume of stool in the rectum with 8 cm  fecalith. there is high density material within the gastric wall  or lumen. Unremarkable appendix. Numerous fluid-filled loops of  small bowel which can be seen in gastroenteritis.   VASCULAR: Unremarkable  LYMPH NODES: No lymphadenopathy per CT criteria.  PERITONEUM/RETROPERITONEUM: Unremarkable.   OSSEOUS STRUCTURES: Unremarkable  SOFT TISSUES: Unremarkable      Impression:      1. Large volume of stool in the rectum with 8 cm fecalith.  2. High density material within gastric wall or lumen.  Correlation with recent barium administration is recommended.  Alternatively, this could represent artifactual calcification of  the gastric mucosa such as with antihypertensive or calcium  supplement administration or much less likely gastric neoplasm.  3. Numerous fluid-filled loops of small bowel which can be seen  in gastroenteritis.    Electronically signed by:  Liudmila Pedro MD  4/3/2022 10:52 AM CDT  Workstation: [id number]          I have reviewed the patient's current medications.     Assessment/Plan     Active Problems:    Type 1 diabetes mellitus with hyperglycemia (HCC)    Constipation    Intractable vomiting    Plan:  -Change lactulose to as needed  -Continue IV fluids with decrease rate 250 cc/h for now  -Electrolyte replacement protocol in place as she has hypokalemia today  -Recheck KUB to evaluate her stool burden since she is  having bowel movements now  -Continue current insulin orders and glucose checks  -Continue home medications as appropriate  -DVT prophylaxis not indicated given low risk factors  -CODE STATUS: Full    I confirmed that the patient's Advance Care Plan is present, code status is documented, or surrogate decision maker is listed in the patient's medical record.     Discharge Planning: In process.    Jay Vega MD

## 2022-04-04 NOTE — PLAN OF CARE
Goal Outcome Evaluation:  Plan of Care Reviewed With: patient        Progress: no change  Outcome Evaluation: pt pleasant and cooperative. no vomitting but has c/o nausea. prn medication given. mag and potassium replaced. no c/o pain. no s/s of distress noted or observed at this time.

## 2022-04-04 NOTE — DISCHARGE SUMMARY
[facility] Medicine Services  DISCHARGE SUMMARY       Date of Admission: 4/3/2022  Date of Discharge:  4/4/2022  Primary Care Physician: Maricruz Davis APRN    Presenting Problem/History of Present Illness:  Intractable vomiting [R11.10]  Constipation, unspecified constipation type [K59.00]     Final Discharge Diagnoses:  Active Hospital Problems    Diagnosis    • Intractable vomiting    • Constipation    • Type 1 diabetes mellitus with hyperglycemia (HCC)        Consults:   Consults     No orders found for last 30 day(s).          Procedures Performed:                 Pertinent Test Results:   Lab Results (most recent)     Procedure Component Value Units Date/Time    Basic Metabolic Panel [[id number]]  (Abnormal) Collected: 04/04/22 0530    Specimen: Blood Updated: 04/04/22 0625     Glucose 111 mg/dL      BUN 4 mg/dL      Creatinine 0.43 mg/dL      Sodium 136 mmol/L      Potassium 3.2 mmol/L      Chloride 110 mmol/L      CO2 16.0 mmol/L      Calcium 7.7 mg/dL      BUN/Creatinine Ratio 9.3     Anion Gap 10.0 mmol/L      eGFR 141.2 mL/min/1.73      Comment: National Kidney Foundation and American Society of Nephrology (ASN) Task Force recommended calculation based on the Chronic Kidney Disease Epidemiology Collaboration (CKD-EPI) equation refit without adjustment for race.       Narrative:      GFR Normal >60  Chronic Kidney Disease <60  Kidney Failure <15      Magnesium [[id number]]  (Normal) Collected: 04/04/22 0530    Specimen: Blood Updated: 04/04/22 0624     Magnesium 2.1 mg/dL     CBC Auto Differential [[id number]]  (Abnormal) Collected: 04/04/22 0530    Specimen: Blood Updated: 04/04/22 0611     WBC 6.86 10*3/mm3      RBC 4.17 10*6/mm3      Hemoglobin 12.6 g/dL      Hematocrit 35.6 %      MCV 85.4 fL      MCH 30.2 pg      MCHC 35.4 g/dL      RDW 12.7 %      RDW-SD 39.1 fl      MPV 9.9 fL      Platelets 295 10*3/mm3      Neutrophil % 82.1 %      Lymphocyte % 9.2 %       Monocyte % 8.0 %      Eosinophil % 0.0 %      Basophil % 0.3 %      Immature Grans % 0.4 %      Neutrophils, Absolute 5.63 10*3/mm3      Lymphocytes, Absolute 0.63 10*3/mm3      Monocytes, Absolute 0.55 10*3/mm3      Eosinophils, Absolute 0.00 10*3/mm3      Basophils, Absolute 0.02 10*3/mm3      Immature Grans, Absolute 0.03 10*3/mm3      nRBC 0.0 /100 WBC     Potassium [[id number]]  (Abnormal) Collected: 04/03/22 1603    Specimen: Blood Updated: 04/03/22 1618     Potassium 3.4 mmol/L     Magnesium [[id number]]  (Abnormal) Collected: 04/03/22 1603    Specimen: Blood Updated: 04/03/22 1618     Magnesium 1.3 mg/dL     Urinalysis, Microscopic Only - Urine, Clean Catch [[id number]]  (Abnormal) Collected: 04/03/22 0751    Specimen: Urine, Clean Catch Updated: 04/03/22 0803     RBC, UA 0-2 /HPF      WBC, UA 0-2 /HPF      Bacteria, UA None Seen /HPF      Squamous Epithelial Cells, UA 0-2 /HPF      Hyaline Casts, UA 0-2 /LPF      Methodology Automated Microscopy    Urinalysis With Microscopic If Indicated (No Culture) - Urine, Clean Catch [[id number]]  (Abnormal) Collected: 04/03/22 0751    Specimen: Urine, Clean Catch Updated: 04/03/22 0800     Color, UA Yellow     Appearance, UA Clear     pH, UA 5.5     Specific Gravity, UA 1.038     Comment: Result obtained by Refractometer        Glucose, UA >=1000 mg/dL (3+)     Ketones, UA >=160 mg/dL (4+)     Bilirubin, UA Negative     Blood, UA Negative     Protein, UA 30 mg/dL (1+)     Leuk Esterase, UA Negative     Nitrite, UA Negative     Urobilinogen, UA 1.0 E.U./dL    Acetone [[id number]]  (Abnormal) Collected: 04/03/22 0535    Specimen: Blood Updated: 04/03/22 0743     Acetone Small    COVID-19 and FLU A/B PCR - Swab, Nasopharynx [[id number]]  (Normal) Collected: 04/03/22 0612    Specimen: Swab from Nasopharynx Updated: 04/03/22 0652     COVID19 Not Detected     Influenza A PCR Not Detected     Influenza B PCR Not Detected    Narrative:      Fact sheet for providers:  https://www.fda.gov/media/817667/download    Fact sheet for patients: https://www.fda.gov/media/129206/download    Test performed by PCR.    hCG, Quantitative, Pregnancy [[id number]] Collected: 04/03/22 0535    Specimen: Blood Updated: 04/03/22 0622     HCG Quantitative <0.10 mIU/mL     Narrative:      HCG Ranges by Gestational Age    Females - non-pregnant premenopausal   </= 1mIU/mL HCG  Females - postmenopausal               </= 7mIU/mL HCG    3 Weeks         5.8 -    71.2 mIU/mL  4 Weeks         9.5 -     750 mIU/mL  5 Weeks         217 -   7,138 mIU/mL  6 Weeks         158 -  31,795 mIU/mL  7 Weeks       3,697 - 163,563 mIU/mL  8 Weeks      32,065 - 149,571 mIU/mL  9 Weeks      63,803 - 151,410 mIU/mL  10 Weeks     46,509 - 186,977 mIU/mL  12 Weeks     27,832 - 210,612 mIU/mL  14 Weeks     13,950 -  62,530 mIU/mL  15 Weeks     12,039 -  70,971 mIU/mL  16 Weeks      9,040 -  56,451 mIU/mL  17 Weeks      8,175 -  55,868 mIU/mL  18 Weeks      8,099 -  58,176 mIU/mL  Results may be falsely decreased if patient taking Biotin.      Comprehensive Metabolic Panel [[id number]]  (Abnormal) Collected: 04/03/22 0535    Specimen: Blood Updated: 04/03/22 0620     Glucose 217 mg/dL      BUN 12 mg/dL      Creatinine 0.65 mg/dL      Sodium 135 mmol/L      Potassium 3.0 mmol/L      Comment: Slight hemolysis detected by analyzer. Results may be affected.        Chloride 101 mmol/L      CO2 19.0 mmol/L      Calcium 9.3 mg/dL      Total Protein 7.8 g/dL      Albumin 4.30 g/dL      ALT (SGPT) 10 U/L      AST (SGOT) 14 U/L      Alkaline Phosphatase 111 U/L      Total Bilirubin 0.3 mg/dL      Globulin 3.5 gm/dL      A/G Ratio 1.2 g/dL      BUN/Creatinine Ratio 18.5     Anion Gap 15.0 mmol/L      eGFR 127.8 mL/min/1.73      Comment: National Kidney Foundation and American Society of Nephrology (ASN) Task Force recommended calculation based on the Chronic Kidney Disease Epidemiology Collaboration (CKD-EPI) equation refit without  adjustment for race.       Narrative:      GFR Normal >60  Chronic Kidney Disease <60  Kidney Failure <15      Lipase [[id number]]  (Normal) Collected: 04/03/22 0535    Specimen: Blood Updated: 04/03/22 0617     Lipase 13 U/L     CBC & Differential [[id number]]  (Abnormal) Collected: 04/03/22 0535    Specimen: Blood Updated: 04/03/22 0546    Narrative:      The following orders were created for panel order CBC & Differential.  Procedure                               Abnormality         Status                     ---------                               -----------         ------                     CBC Auto Differential[[id number]]        Abnormal            Final result                 Please view results for these tests on the individual orders.    CBC Auto Differential [[id number]]  (Abnormal) Collected: 04/03/22 0535    Specimen: Blood Updated: 04/03/22 0546     WBC 11.09 10*3/mm3      RBC 5.29 10*6/mm3      Hemoglobin 16.2 g/dL      Hematocrit 46.4 %      MCV 87.7 fL      MCH 30.6 pg      MCHC 34.9 g/dL      RDW 12.5 %      RDW-SD 39.7 fl      MPV 9.7 fL      Platelets 423 10*3/mm3      Neutrophil % 81.6 %      Lymphocyte % 12.4 %      Monocyte % 5.0 %      Eosinophil % 0.1 %      Basophil % 0.5 %      Immature Grans % 0.4 %      Neutrophils, Absolute 9.05 10*3/mm3      Lymphocytes, Absolute 1.38 10*3/mm3      Monocytes, Absolute 0.56 10*3/mm3      Eosinophils, Absolute 0.01 10*3/mm3      Basophils, Absolute 0.05 10*3/mm3      Immature Grans, Absolute 0.04 10*3/mm3      nRBC 0.0 /100 WBC         Imaging Results (Most Recent)     Procedure Component Value Units Date/Time    XR Abdomen KUB [[id number]] Collected: 04/04/22 0930     Updated: 04/04/22 1048    Narrative:      Procedure: Supine abdomen two views    Reason for exam: Constipation.    FINDINGS: Bony structures of the abdomen are normal. Soft tissue  structures of the abdomen are normal. No pathologic  calcifications. Nonobstructive bowel gas pattern.       Impression:      No acute abdominal abnormality.    Electronically signed by:  Stephen Londono MD  4/4/2022 10:46 AM CDT  Workstation: [id number]    CT Abdomen Pelvis With Contrast [[id number]] Collected: 04/03/22 0840     Updated: 04/03/22 1054    Narrative:      CT ABDOMEN PELVIS WITH CONTRAST    INDICATION: Nausea/vomiting    COMPARISON: None    TECHNIQUE: Spiral CT images were acquired of the abdomen and  pelvis with multiplanar reconstructions.    CONTRAST: 90mL Isovue 300 IV    FINDINGS:  INFERIOR THORAX: Unremarkable  LIVER: Focal fatty infiltration along the ligamentum teres.  GALLBLADDER: Unremarkable  SPLEEN: Unremarkable  PANCREAS: Unremarkable  ADRENAL GLANDS: Unremarkable  KIDNEYS: Unremarkable  URETERS: Unremarkable  BLADDER: Unremarkable  REPRODUCTIVE: Right ovarian follicle. Tampon.  GASTROINTESTINAL: Large volume of stool in the rectum with 8 cm  fecalith. there is high density material within the gastric wall  or lumen. Unremarkable appendix. Numerous fluid-filled loops of  small bowel which can be seen in gastroenteritis.   VASCULAR: Unremarkable  LYMPH NODES: No lymphadenopathy per CT criteria.  PERITONEUM/RETROPERITONEUM: Unremarkable.   OSSEOUS STRUCTURES: Unremarkable  SOFT TISSUES: Unremarkable      Impression:      1. Large volume of stool in the rectum with 8 cm fecalith.  2. High density material within gastric wall or lumen.  Correlation with recent barium administration is recommended.  Alternatively, this could represent artifactual calcification of  the gastric mucosa such as with antihypertensive or calcium  supplement administration or much less likely gastric neoplasm.  3. Numerous fluid-filled loops of small bowel which can be seen  in gastroenteritis.    Electronically signed by:  Liudmila Pedro MD  4/3/2022 10:52 AM CDT  Workstation: [id number]          Chief Complaint on Day of Discharge: No new complaints    Hospital Course:  The patient is a 22 y.o. female with medical history notable  "for type 1 diabetes mellitus who presented to [facility] with intractable nausea and vomiting.  Patient initially had concerns for DKA but her evaluation was not consistent with this.  CT scan of her abdomen pelvis showed constipation fecalith.  Patient was admitted and continued on IV fluids as well as sliding scale insulin and basal insulin.  She was started on full liquid diet and tolerated this well.  Patient's bowel function improved and she was noted to have loose stools on the day of discharge.  Patient otherwise felt improved and stable for discharge home.  Patient voiced understanding agreement with the plan.      Condition on Discharge: Stable    Physical Exam on Discharge:  /68 (BP Location: Left arm, Patient Position: Lying)   Pulse 106   Temp 96.9 °F (36.1 °C) (Oral)   Resp 16   Ht 172.7 cm (68\")   Wt 85 kg (187 lb 6.4 oz)   LMP 03/31/2022   SpO2 99%   BMI 28.49 kg/m²   Physical Exam  Constitutional:       General: She is not in acute distress.     Appearance: She is not toxic-appearing.   HENT:      Head: Normocephalic and atraumatic.      Right Ear: External ear normal.      Left Ear: External ear normal.      Nose: Nose normal.      Mouth/Throat:      Mouth: Mucous membranes are moist.      Pharynx: Oropharynx is clear.   Eyes:      Conjunctiva/sclera: Conjunctivae normal.   Cardiovascular:      Rate and Rhythm: Normal rate and regular rhythm.      Pulses: Normal pulses.      Heart sounds: Normal heart sounds.   Pulmonary:      Effort: Pulmonary effort is normal. No respiratory distress.      Breath sounds: Normal breath sounds.   Abdominal:      General: Bowel sounds are normal.      Palpations: Abdomen is soft.      Tenderness: There is no abdominal tenderness.   Musculoskeletal:         General: No swelling.      Cervical back: Neck supple.   Skin:     General: Skin is warm and dry.      Capillary Refill: Capillary refill takes less than 2 seconds.   Neurological:    "   General: No focal deficit present.      Mental Status: She is alert and oriented to person, place, and time. Mental status is at baseline.   Psychiatric:         Behavior: Behavior normal.         Thought Content: Thought content normal.       Discharge Disposition:  Home or Self Care    Discharge Medications:     Discharge Medications      Continue These Medications      Instructions Start Date   albuterol 108 (90 Base) MCG/ACT inhaler  Commonly known as: PROAIR RESPICLICK   2 puffs, Inhalation, Every 4 Hours PRN      B-D UF III MINI PEN NEEDLES 31G X 5 MM misc  Generic drug: Insulin Pen Needle   USE 4-6 TIMES PER DAY      Baqsimi One Pack 3 MG/DOSE powder  Generic drug: Glucagon   1 each, Nasal, As Needed, Apply intranasal if hypoglycemia      Blood Glucose/Ketone Monitor device   Use as indicated      clotrimazole-betamethasone 1-0.05 % cream  Commonly known as: LOTRISONE   Topical, 2 Times Daily PRN      dicyclomine 10 MG capsule  Commonly known as: BENTYL   10 mg, Oral, 4 Times Daily PRN      fluconazole 150 MG tablet  Commonly known as: Diflucan   Take 1 tablet by mouth today and repeat in 4 days if symptoms still present.      glucose blood test strip  Commonly known as: Accu-Chek Kamla Plus   200 each, Other, 6 Times Daily, Use as instructed      glucose blood test strip   Testing 4 times daily, DX E10.65      ONE TOUCH ULTRA TEST test strip  Generic drug: glucose blood   TEST BLOOD SUGAR 6 TIMES DAILY AS DIRECTED      HumaLOG KwikPen 200 UNIT/ML solution pen-injector  Generic drug: Insulin Lispro   25 Units, Subcutaneous, 3 Times Daily      Keto-Diastix strip   Test if BG greater than 250      loperamide 2 MG capsule  Commonly known as: IMODIUM   2 mg, Oral, 4 Times Daily PRN      Lyumjev 100 UNIT/ML injection  Generic drug: Insulin Lispro-aabc   35 units three times daily, provide a 30 day supply      norethindrone 0.35 MG tablet  Commonly known as: MICRONOR   No dose, route, or frequency recorded.       ondansetron 4 MG tablet  Commonly known as: ZOFRAN   No dose, route, or frequency recorded.      ondansetron ODT 4 MG disintegrating tablet  Commonly known as: ZOFRAN-ODT   4 mg, Oral, Every 8 Hours PRN      ondansetron ODT 4 MG disintegrating tablet  Commonly known as: ZOFRAN-ODT   4 mg, Translingual, Every 8 Hours PRN      PRECISION XTRA strip  Generic drug: Ketone Blood Test   CHECK AS NEEDED WHEN BLOOD SUGAR ABOVE 250      sertraline 50 MG tablet  Commonly known as: ZOLOFT   50 mg, Oral, Daily      Tilia Fe 1-20/1-30/1-35 MG-MCG tablet  Generic drug: norethindrone-ethinyl estradiol-iron   TAKE 1 TABLET BY MOUTH DAILY      T[place] Max SoloStar 300 UNIT/ML solution pen-injector injection  Generic drug: Insulin Glargine (2 Unit Dial)   42 Units, Subcutaneous, Daily             Discharge Diet:   Diet Instructions     Diet: Regular, Consistent Carbohydrate      Discharge Diet:  Regular  Consistent Carbohydrate             Activity at Discharge:   Activity Instructions     Activity as Tolerated            Discharge Care Plan/Instructions: Take medications as prescribed, follow-up with PCP, return for worsening symptoms.    Follow-up Appointments:   No future appointments.    Test Results Pending at Discharge: None    Jay Vega MD    Time: 29 minutes

## 2022-04-04 NOTE — PLAN OF CARE
Goal Outcome Evaluation:  Plan of Care Reviewed With: patient        Progress: improving  Outcome Evaluation: patient discharging

## 2022-04-05 LAB
GLUCOSE BLDC GLUCOMTR-MCNC: 171 MG/DL (ref 70–130)
GLUCOSE BLDC GLUCOMTR-MCNC: 175 MG/DL (ref 70–130)
GLUCOSE BLDC GLUCOMTR-MCNC: 189 MG/DL (ref 70–130)
GLUCOSE BLDC GLUCOMTR-MCNC: 197 MG/DL (ref 70–130)
GLUCOSE BLDC GLUCOMTR-MCNC: 79 MG/DL (ref 70–130)
GLUCOSE BLDC GLUCOMTR-MCNC: 82 MG/DL (ref 70–130)

## 2022-07-15 ENCOUNTER — TELEPHONE (OUTPATIENT)
Dept: ENDOCRINOLOGY | Facility: CLINIC | Age: 22
End: 2022-07-15

## 2022-07-15 ENCOUNTER — OFFICE VISIT (OUTPATIENT)
Dept: ENDOCRINOLOGY | Facility: CLINIC | Age: 22
End: 2022-07-15

## 2022-07-15 ENCOUNTER — LAB (OUTPATIENT)
Dept: LAB | Facility: HOSPITAL | Age: 22
End: 2022-07-15

## 2022-07-15 VITALS
WEIGHT: 192 LBS | OXYGEN SATURATION: 99 % | HEIGHT: 68 IN | SYSTOLIC BLOOD PRESSURE: 110 MMHG | HEART RATE: 101 BPM | DIASTOLIC BLOOD PRESSURE: 78 MMHG | BODY MASS INDEX: 29.1 KG/M2

## 2022-07-15 DIAGNOSIS — E55.9 VITAMIN D DEFICIENCY: ICD-10-CM

## 2022-07-15 DIAGNOSIS — R79.89 ABNORMAL THYROID BLOOD TEST: ICD-10-CM

## 2022-07-15 DIAGNOSIS — E10.65 TYPE 1 DIABETES MELLITUS WITH HYPERGLYCEMIA: Primary | ICD-10-CM

## 2022-07-15 LAB
25(OH)D3 SERPL-MCNC: 22.8 NG/ML (ref 30–100)
ALBUMIN SERPL-MCNC: 4.2 G/DL (ref 3.5–5.2)
ALBUMIN UR-MCNC: 2.1 MG/DL
ALBUMIN/GLOB SERPL: 1.4 G/DL
ALP SERPL-CCNC: 100 U/L (ref 39–117)
ALT SERPL W P-5'-P-CCNC: 12 U/L (ref 1–33)
ANION GAP SERPL CALCULATED.3IONS-SCNC: 12 MMOL/L (ref 5–15)
AST SERPL-CCNC: 16 U/L (ref 1–32)
BASOPHILS # BLD AUTO: 0.05 10*3/MM3 (ref 0–0.2)
BASOPHILS NFR BLD AUTO: 0.9 % (ref 0–1.5)
BILIRUB SERPL-MCNC: 0.2 MG/DL (ref 0–1.2)
BUN SERPL-MCNC: 13 MG/DL (ref 6–20)
BUN/CREAT SERPL: 26 (ref 7–25)
CALCIUM SPEC-SCNC: 8.9 MG/DL (ref 8.6–10.5)
CHLORIDE SERPL-SCNC: 100 MMOL/L (ref 98–107)
CHOLEST SERPL-MCNC: 182 MG/DL (ref 0–200)
CO2 SERPL-SCNC: 25 MMOL/L (ref 22–29)
CREAT SERPL-MCNC: 0.5 MG/DL (ref 0.57–1)
CREAT UR-MCNC: 113.2 MG/DL
DEPRECATED RDW RBC AUTO: 41 FL (ref 37–54)
EGFRCR SERPLBLD CKD-EPI 2021: 136.2 ML/MIN/1.73
EOSINOPHIL # BLD AUTO: 0.09 10*3/MM3 (ref 0–0.4)
EOSINOPHIL NFR BLD AUTO: 1.7 % (ref 0.3–6.2)
ERYTHROCYTE [DISTWIDTH] IN BLOOD BY AUTOMATED COUNT: 13 % (ref 12.3–15.4)
GLOBULIN UR ELPH-MCNC: 3.1 GM/DL
GLUCOSE SERPL-MCNC: 109 MG/DL (ref 65–99)
HBA1C MFR BLD: 12 % (ref 4.8–5.6)
HCT VFR BLD AUTO: 38.8 % (ref 34–46.6)
HDLC SERPL-MCNC: 63 MG/DL (ref 40–60)
HGB BLD-MCNC: 13.3 G/DL (ref 12–15.9)
IMM GRANULOCYTES # BLD AUTO: 0.01 10*3/MM3 (ref 0–0.05)
IMM GRANULOCYTES NFR BLD AUTO: 0.2 % (ref 0–0.5)
LDLC SERPL CALC-MCNC: 106 MG/DL (ref 0–100)
LDLC/HDLC SERPL: 1.67 {RATIO}
LYMPHOCYTES # BLD AUTO: 1.95 10*3/MM3 (ref 0.7–3.1)
LYMPHOCYTES NFR BLD AUTO: 36.2 % (ref 19.6–45.3)
MCH RBC QN AUTO: 29.8 PG (ref 26.6–33)
MCHC RBC AUTO-ENTMCNC: 34.3 G/DL (ref 31.5–35.7)
MCV RBC AUTO: 87 FL (ref 79–97)
MICROALBUMIN/CREAT UR: 18.6 MG/G
MONOCYTES # BLD AUTO: 0.54 10*3/MM3 (ref 0.1–0.9)
MONOCYTES NFR BLD AUTO: 10 % (ref 5–12)
NEUTROPHILS NFR BLD AUTO: 2.75 10*3/MM3 (ref 1.7–7)
NEUTROPHILS NFR BLD AUTO: 51 % (ref 42.7–76)
NRBC BLD AUTO-RTO: 0 /100 WBC (ref 0–0.2)
PLATELET # BLD AUTO: 412 10*3/MM3 (ref 140–450)
PMV BLD AUTO: 9.6 FL (ref 6–12)
POTASSIUM SERPL-SCNC: 3.5 MMOL/L (ref 3.5–5.2)
PROT SERPL-MCNC: 7.3 G/DL (ref 6–8.5)
RBC # BLD AUTO: 4.46 10*6/MM3 (ref 3.77–5.28)
SODIUM SERPL-SCNC: 137 MMOL/L (ref 136–145)
TRIGL SERPL-MCNC: 70 MG/DL (ref 0–150)
TSH SERPL DL<=0.05 MIU/L-ACNC: 3.11 UIU/ML (ref 0.27–4.2)
VIT B12 BLD-MCNC: 492 PG/ML (ref 211–946)
VLDLC SERPL-MCNC: 13 MG/DL (ref 5–40)
WBC NRBC COR # BLD: 5.39 10*3/MM3 (ref 3.4–10.8)

## 2022-07-15 PROCEDURE — 86364 TISS TRNSGLTMNASE EA IG CLAS: CPT | Performed by: INTERNAL MEDICINE

## 2022-07-15 PROCEDURE — 80061 LIPID PANEL: CPT | Performed by: INTERNAL MEDICINE

## 2022-07-15 PROCEDURE — 86341 ISLET CELL ANTIBODY: CPT | Performed by: INTERNAL MEDICINE

## 2022-07-15 PROCEDURE — 80050 GENERAL HEALTH PANEL: CPT | Performed by: INTERNAL MEDICINE

## 2022-07-15 PROCEDURE — 82043 UR ALBUMIN QUANTITATIVE: CPT | Performed by: INTERNAL MEDICINE

## 2022-07-15 PROCEDURE — 82784 ASSAY IGA/IGD/IGG/IGM EACH: CPT | Performed by: INTERNAL MEDICINE

## 2022-07-15 PROCEDURE — 36415 COLL VENOUS BLD VENIPUNCTURE: CPT | Performed by: INTERNAL MEDICINE

## 2022-07-15 PROCEDURE — 82607 VITAMIN B-12: CPT | Performed by: INTERNAL MEDICINE

## 2022-07-15 PROCEDURE — 82570 ASSAY OF URINE CREATININE: CPT | Performed by: INTERNAL MEDICINE

## 2022-07-15 PROCEDURE — 86258 DGP ANTIBODY EACH IG CLASS: CPT | Performed by: INTERNAL MEDICINE

## 2022-07-15 PROCEDURE — 83036 HEMOGLOBIN GLYCOSYLATED A1C: CPT | Performed by: INTERNAL MEDICINE

## 2022-07-15 PROCEDURE — 99214 OFFICE O/P EST MOD 30 MIN: CPT | Performed by: INTERNAL MEDICINE

## 2022-07-15 PROCEDURE — 84681 ASSAY OF C-PEPTIDE: CPT | Performed by: INTERNAL MEDICINE

## 2022-07-15 PROCEDURE — 82306 VITAMIN D 25 HYDROXY: CPT | Performed by: INTERNAL MEDICINE

## 2022-07-15 RX ORDER — FLURBIPROFEN SODIUM 0.3 MG/ML
SOLUTION/ DROPS OPHTHALMIC
Qty: 200 EACH | Refills: 10 | Status: SHIPPED | OUTPATIENT
Start: 2022-07-15

## 2022-07-15 RX ORDER — INSULIN GLARGINE 300 U/ML
50 INJECTION, SOLUTION SUBCUTANEOUS DAILY
Qty: 5 PEN | Refills: 11 | Status: SHIPPED | OUTPATIENT
Start: 2022-07-15

## 2022-07-15 RX ORDER — GLUCAGON 3 MG/1
1 POWDER NASAL AS NEEDED
Qty: 2 EACH | Refills: 11 | Status: SHIPPED | OUTPATIENT
Start: 2022-07-15

## 2022-07-15 RX ORDER — PROCHLORPERAZINE 25 MG/1
1 SUPPOSITORY RECTAL ONCE
Qty: 1 EACH | Refills: 3 | Status: SHIPPED | OUTPATIENT
Start: 2022-07-15 | End: 2022-07-15

## 2022-07-15 RX ORDER — PROCHLORPERAZINE 25 MG/1
SUPPOSITORY RECTAL AS NEEDED
Qty: 9 EACH | Refills: 3 | Status: SHIPPED | OUTPATIENT
Start: 2022-07-15

## 2022-07-15 NOTE — PROGRESS NOTES
Subjective    Anna García is a 22 y.o. female. she is here today for follow-up.          Diabetes  Pertinent negatives for hypoglycemia include no confusion, dizziness, headaches, pallor or tremors. Pertinent negatives for diabetes include no chest pain, no fatigue, no polydipsia, no polyphagia, no polyuria and no weakness.         IN OFFICE VISIT     History of Present Illness      Reason -  Diabetes         Duration/Timing:  Diabetes mellitus type 1, Age at onset of diabetes: 8 years     Timing - constant      Quality -  not controlled               Severity (Complications/Hospitalizations)     Secondary Macrovascular Complications:  No CAD, No CVA, No PAD  Secondary Microvascular Complications:  No Diabetic Nephropathy, No proteinuria, No Diabetic Retinopathy, No Diabetic Neuropathy     Context  Diabetes Regimen:      toujeo , lyumjev          Lab Results   Component Value Date    HGBA1C 12.00 (H) 07/15/2022                   Exercise:  Does not exercise     Associated Signs/Symptoms  Hyperglycemic Symptoms:  Positive  polyuria, No polydipsia, No polyphagia  Hypoglycemic Episodes:  + documented hypoglycemia  See dexcom below             Assessment & Plan      1. Type 1 diabetes mellitus with hyperglycemia (HCC)    2. Vitamin D deficiency    3. Abnormal thyroid blood test    .      Type 1 DM w hyperglycemia      no complications      Lab Results   Component Value Date    HGBA1C 12.00 (H) 07/15/2022      Dexcom G6      Type 1 DM w hyperglycemia      Toujeo 56 - decrease to 45 , Mealtime 1:4 ( lyumjev ) , correction 1:30 , target 140   Change to Tandem T slim , control IQ    1.8 u per hour , carb ratio 5, correction 30 , target 140   baqsimi       Approve for T slim        Lipid Management        Lab Results   Component Value Date     (H) 07/15/2022          None, young      Blood Pressure Management        nl w/o ace I        Microvascular Complication Monitoring:             No neuropathy          Preventive Care:  Patient is not smoking     Weight Related:  Not overweight, No obesity     Bone Health     low vit D           Lab Results   Component Value Date    TYCL68DQ 22.8 (L) 07/15/2022    DTKT12GG 20.0 (L) 08/10/2021        vitamin d 1000 units daily    start vit D 50 th units weekly      Other Diabetes Related Aspects  Jan 2016     nl celiac      Lab Results   Component Value Date    TSH 3.110 07/15/2022             Lab Results   Component Value Date    [id number] 492 07/15/2022

## 2022-07-15 NOTE — TELEPHONE ENCOUNTER
[facility] called they are confused about patient presciption for a keto monitor device. They would like a call back at [phone]

## 2022-07-16 LAB
C PEPTIDE SERPL-MCNC: <0.1 NG/ML (ref 1.1–4.4)
GLIADIN PEPTIDE IGA SER-ACNC: 8 UNITS (ref 0–19)
IGA SERPL-MCNC: 226 MG/DL (ref 87–352)
TTG IGA SER-ACNC: <2 U/ML (ref 0–3)

## 2022-07-17 RX ORDER — CHOLECALCIFEROL (VITAMIN D3) 1250 MCG
50000 CAPSULE ORAL
Qty: 4 CAPSULE | Refills: 11 | Status: SHIPPED | OUTPATIENT
Start: 2022-07-17

## 2022-07-19 LAB — GAD65 AB SER IA-ACNC: 1941.6 U/ML (ref 0–5)

## 2022-07-20 ENCOUNTER — DOCUMENTATION (OUTPATIENT)
Dept: ENDOCRINOLOGY | Facility: CLINIC | Age: 22
End: 2022-07-20

## 2022-10-28 ENCOUNTER — OFFICE VISIT (OUTPATIENT)
Dept: ENDOCRINOLOGY | Facility: CLINIC | Age: 22
End: 2022-10-28

## 2022-10-28 VITALS
SYSTOLIC BLOOD PRESSURE: 110 MMHG | BODY MASS INDEX: 28.64 KG/M2 | HEART RATE: 90 BPM | HEIGHT: 68 IN | OXYGEN SATURATION: 98 % | DIASTOLIC BLOOD PRESSURE: 70 MMHG | WEIGHT: 189 LBS

## 2022-10-28 DIAGNOSIS — E10.65 TYPE 1 DIABETES MELLITUS WITH HYPERGLYCEMIA: Primary | ICD-10-CM

## 2022-10-28 DIAGNOSIS — E65 LIPOHYPERTROPHY DUE TO INSULIN INJECTION: ICD-10-CM

## 2022-10-28 DIAGNOSIS — T80.89XA LIPOHYPERTROPHY DUE TO INSULIN INJECTION: ICD-10-CM

## 2022-10-28 DIAGNOSIS — R27.8 POOR MANUAL DEXTERITY: ICD-10-CM

## 2022-10-28 DIAGNOSIS — F40.231 FEAR OF INJECTIONS: ICD-10-CM

## 2022-10-28 DIAGNOSIS — L84 PRE-ULCERATIVE CALLUSES: ICD-10-CM

## 2022-10-28 PROCEDURE — 99214 OFFICE O/P EST MOD 30 MIN: CPT | Performed by: INTERNAL MEDICINE

## 2022-10-28 PROCEDURE — 95251 CONT GLUC MNTR ANALYSIS I&R: CPT | Performed by: INTERNAL MEDICINE

## 2022-10-28 RX ORDER — BLOOD-GLUCOSE METER
KIT MISCELLANEOUS
Qty: 120 EACH | Refills: 11 | Status: SHIPPED | OUTPATIENT
Start: 2022-10-28

## 2022-10-28 RX ORDER — LANCING DEVICE
EACH MISCELLANEOUS
Qty: 1 EACH | Refills: 11 | Status: SHIPPED | OUTPATIENT
Start: 2022-10-28

## 2022-10-28 RX ORDER — ISOPROPYL ALCOHOL 0.7 ML/1
SWAB TOPICAL
Qty: 120 EACH | Refills: 11 | Status: SHIPPED | OUTPATIENT
Start: 2022-10-28

## 2022-10-28 RX ORDER — GLUCOSAMINE HCL/CHONDROITIN SU 500-400 MG
CAPSULE ORAL
Qty: 120 EACH | Refills: 11 | Status: SHIPPED | OUTPATIENT
Start: 2022-10-28

## 2022-10-28 NOTE — PROGRESS NOTES
"  Subjective    Anna García is a 22 y.o. female. she is here today for follow-up.          Diabetes  Pertinent negatives for hypoglycemia include no confusion, dizziness, headaches, pallor or tremors. Pertinent negatives for diabetes include no chest pain, no fatigue, no polydipsia, no polyphagia, no polyuria and no weakness.         IN OFFICE VISIT     History of Present Illness      Reason -  Diabetes         Duration/Timing:  Diabetes mellitus type 1, Age at onset of diabetes: 8 years     Timing - constant      Quality -  not controlled               Severity (Complications/Hospitalizations)     Secondary Macrovascular Complications:  No CAD, No CVA, No PAD  Secondary Microvascular Complications:  No Diabetic Nephropathy, No proteinuria, No Diabetic Retinopathy, No Diabetic Neuropathy     Context  Diabetes Regimen:      mukesh beasley          Lab Results   Component Value Date    HGBA1C 12.00 (H) 07/15/2022                   Exercise:  Does not exercise     Associated Signs/Symptoms  Hyperglycemic Symptoms:  Positive  polyuria, No polydipsia, No polyphagia  Hypoglycemic Episodes:  + documented hypoglycemia  See dexcom below         PE    /70   Pulse 90   Ht 172.7 cm (68\")   Wt 85.7 kg (189 lb)   SpO2 98%   BMI 28.74 kg/m²   AOx3  No visible goiter  Normal Respiratory Effort , Lung CTA  RRR  No Edema  Decreased hand dexterity  lipohypertophy at injection sites     Bilateral preulcerative callus    Decreased sensation to monofilament   Labs    Lab Results   Component Value Date    WBC 5.39 07/15/2022    HGB 13.3 07/15/2022    HCT 38.8 07/15/2022    MCV 87.0 07/15/2022     07/15/2022     Lab Results   Component Value Date    GLUCOSE 109 (H) 07/15/2022    BUN 13 07/15/2022    CREATININE 0.50 (L) 07/15/2022    EGFRIFNONA >150 01/03/2021    EGFRIFAFRI 244 09/11/2021    BCR 26.0 (H) 07/15/2022     07/15/2022    K 3.5 07/15/2022    CO2 25.0 07/15/2022    CALCIUM 8.9 07/15/2022    " ALBUMIN 4.20 07/15/2022    AST 16 07/15/2022    ALT 12 07/15/2022         Assessment & Plan      1. Type 1 diabetes mellitus with hyperglycemia (HCC)    2. Lipohypertrophy due to insulin injection    3. Fear of injections    4. Poor manual dexterity    5. Pre-ulcerative calluses    .      Type 1 DM w hyperglycemia      no complications      Lab Results   Component Value Date    HGBA1C 12.00 (H) 07/15/2022      Dexcom G6      Type 1 DM w hyperglycemia , report scanned in      Toujeo 56 - decrease to 45 , Mealtime 1:4 ( lyumjev ) , correction 1:30 , target 140        1.8 u per hour , carb ratio 5, correction 30 , target 140   baqsimi       Approve for T slim , too expensive     She has lipohypertrophy , fear of injections and decreased dexterity        afrezza 4 to 32 units w meals, max dose per day 96 units per day   Lipid Management        Lab Results   Component Value Date     (H) 07/15/2022          None, young      Blood Pressure Management        nl w/o ace I        Microvascular Complication Monitoring:             No neuropathy         Preventive Care:  Patient is not smoking     Weight Related:  Not overweight, No obesity     Bone Health     low vit D           Lab Results   Component Value Date    EXUC51OI 22.8 (L) 07/15/2022    OWEI55VR 20.0 (L) 08/10/2021        vitamin d 1000 units daily    start vit D 50 th units weekly      Other Diabetes Related Aspects  2016     nl celiac      Lab Results   Component Value Date    TSH 3.110 07/15/2022             Lab Results   Component Value Date    DRTBPCGN16 492 07/15/2022            Patient Demographics    Patient Name   Anna García Legal Sex   Female    [address] [facility]    Address   [address] [place] [address] Phone   [phone] (Home)   [phone] (Mobile) *Preferred*     Patient Contacts    Name Relation Home Work Mobile   Gilberto García Spouse [phone]   Gelacio Garcia Mother [phone]  [phone]     PCP and Center    Primary Care Provider   Maricruz Davis, BUBBA Phone   [phone] Center   None     Patient Employment    Status   Part Time    Employer   [employer] [place]    Address   [address] [address]        Active Insurance as of 10/28/2022    ANTHEM BLUE CROSS - ANTHEM BLUE CROSS BLUE SHIELD PPO    Payor Plan Insurance Group Employer/Plan Group   ANTHEM BLUE CROSS ANTHEM BLUE CROSS BLUE SHIELD PPO [id number]    Payor Plan Address Payor Plan Phone Number Payor Plan Fax Number Effective Dates   [address] [phone]  1/1/2020 - None Entered   [place] [address]      Subscriber Name Subscriber Birth Date Member ID    GABRIEL BHAGAT II [birth year]18[birth year]1997 [id number]      ANTHEM BLUE CROSS - ANTHEM BLUE CROSS BLUE SHIELD PPO    Payor Plan Insurance Group Employer/Plan Group   ANTHEM BLUE CROSS ANTHEM BLUE CROSS BLUE SHIELD PPO [id number]    Payor Plan Address Payor Plan Phone Number Payor Plan Fax Number Effective Dates   [address] [phone]  1/1/2020 - None Entered   [place] [address]      Subscriber Name Subscriber Birth Date Member ID    JOSE RODRIGUEZ JR. [birth year]5/1967 [id number]      Current Medications    Acetone, Urine, Test (TRUEPLUS KETONE TEST STRIPS)   albuterol (PROAIR RESPICLICK) 108 (90 Base) MCG/ACT inhaler   Blood Glucose/Ketone Monitor device   Cholecalciferol (Vitamin D3) 1.25 MG (25717 UT) capsule   Continuous Blood Gluc Sensor (Dexcom G6 Sensor)   fluconazole (DIFLUCAN) 150 MG tablet   Glucagon (Baqsimi One Pack) 3 MG/DOSE powder   Insulin Glargine, 2 Unit Dial, (Toujeo Max SoloStar) 300 UNIT/ML solution pen-injector injection   Insulin Lispro-aabc 200 UNIT/ML solution pen-injector   Insulin Pen Needle (B-D UF III MINI PEN NEEDLES) 31G X 5 MM misc   Ketone Blood Test strip   norethindrone (MICRONOR) 0.35 MG tablet   ondansetron (ZOFRAN) 4 MG tablet   ondansetron ODT (ZOFRAN-ODT) 4 MG disintegrating tablet   sertraline  (ZOLOFT) 50 MG tablet   Urine Glucose-Ketones Test (Keto-Diastix) strip     Allergies    Cefprozil Hives

## 2023-02-01 ENCOUNTER — APPOINTMENT (OUTPATIENT)
Dept: INTERVENTIONAL RADIOLOGY/VASCULAR | Facility: HOSPITAL | Age: 23
DRG: 639 | End: 2023-02-01
Payer: COMMERCIAL

## 2023-02-01 ENCOUNTER — APPOINTMENT (OUTPATIENT)
Dept: ULTRASOUND IMAGING | Facility: HOSPITAL | Age: 23
DRG: 639 | End: 2023-02-01
Payer: COMMERCIAL

## 2023-02-01 ENCOUNTER — APPOINTMENT (OUTPATIENT)
Dept: GENERAL RADIOLOGY | Facility: HOSPITAL | Age: 23
DRG: 639 | End: 2023-02-01
Payer: COMMERCIAL

## 2023-02-01 ENCOUNTER — HOSPITAL ENCOUNTER (INPATIENT)
Facility: HOSPITAL | Age: 23
LOS: 3 days | Discharge: HOME | DRG: 639 | End: 2023-02-04
Attending: EMERGENCY MEDICINE | Admitting: STUDENT IN AN ORGANIZED HEALTH CARE EDUCATION/TRAINING PROGRAM
Payer: COMMERCIAL

## 2023-02-01 DIAGNOSIS — E83.42 HYPOMAGNESEMIA: ICD-10-CM

## 2023-02-01 DIAGNOSIS — E10.10 DIABETIC KETOACIDOSIS WITHOUT COMA ASSOCIATED WITH TYPE 1 DIABETES MELLITUS: Primary | ICD-10-CM

## 2023-02-01 LAB
ACETONE BLD QL: ABNORMAL
ALBUMIN SERPL-MCNC: 4.2 G/DL (ref 3.5–5.2)
ALBUMIN/GLOB SERPL: 1.1 G/DL
ALP SERPL-CCNC: 109 U/L (ref 39–117)
ALT SERPL W P-5'-P-CCNC: 18 U/L (ref 1–33)
ANION GAP SERPL CALCULATED.3IONS-SCNC: 25 MMOL/L (ref 5–15)
AST SERPL-CCNC: 22 U/L (ref 1–32)
BASOPHILS # BLD AUTO: 0.08 10*3/MM3 (ref 0–0.2)
BASOPHILS NFR BLD AUTO: 0.5 % (ref 0–1.5)
BILIRUB SERPL-MCNC: 0.2 MG/DL (ref 0–1.2)
BUN SERPL-MCNC: 12 MG/DL (ref 6–20)
BUN/CREAT SERPL: 16.2 (ref 7–25)
CALCIUM SPEC-SCNC: 9 MG/DL (ref 8.6–10.5)
CHLORIDE SERPL-SCNC: 97 MMOL/L (ref 98–107)
CO2 SERPL-SCNC: 7 MMOL/L (ref 22–29)
CREAT SERPL-MCNC: 0.74 MG/DL (ref 0.57–1)
DEPRECATED RDW RBC AUTO: 40.2 FL (ref 37–54)
EGFRCR SERPLBLD CKD-EPI 2021: 117.5 ML/MIN/1.73
EOSINOPHIL # BLD AUTO: 0.02 10*3/MM3 (ref 0–0.4)
EOSINOPHIL NFR BLD AUTO: 0.1 % (ref 0.3–6.2)
ERYTHROCYTE [DISTWIDTH] IN BLOOD BY AUTOMATED COUNT: 12.3 % (ref 12.3–15.4)
GLOBULIN UR ELPH-MCNC: 3.9 GM/DL
GLUCOSE BLDC GLUCOMTR-MCNC: 290 MG/DL (ref 70–130)
GLUCOSE BLDC GLUCOMTR-MCNC: 301 MG/DL (ref 70–130)
GLUCOSE BLDC GLUCOMTR-MCNC: 348 MG/DL (ref 70–130)
GLUCOSE SERPL-MCNC: 364 MG/DL (ref 65–99)
HBA1C MFR BLD: 11.3 % (ref 4.8–5.6)
HCT VFR BLD AUTO: 46.9 % (ref 34–46.6)
HGB BLD-MCNC: 15.8 G/DL (ref 12–15.9)
IMM GRANULOCYTES # BLD AUTO: 0.1 10*3/MM3 (ref 0–0.05)
IMM GRANULOCYTES NFR BLD AUTO: 0.6 % (ref 0–0.5)
LYMPHOCYTES # BLD AUTO: 1.9 10*3/MM3 (ref 0.7–3.1)
LYMPHOCYTES NFR BLD AUTO: 12.2 % (ref 19.6–45.3)
MAGNESIUM SERPL-MCNC: 1.5 MG/DL (ref 1.6–2.6)
MCH RBC QN AUTO: 30.4 PG (ref 26.6–33)
MCHC RBC AUTO-ENTMCNC: 33.7 G/DL (ref 31.5–35.7)
MCV RBC AUTO: 90.2 FL (ref 79–97)
MONOCYTES # BLD AUTO: 0.9 10*3/MM3 (ref 0.1–0.9)
MONOCYTES NFR BLD AUTO: 5.8 % (ref 5–12)
NEUTROPHILS NFR BLD AUTO: 12.6 10*3/MM3 (ref 1.7–7)
NEUTROPHILS NFR BLD AUTO: 80.8 % (ref 42.7–76)
NRBC BLD AUTO-RTO: 0 /100 WBC (ref 0–0.2)
OSMOLALITY SERPL: 304 MOSM/KG (ref 275–295)
PHOSPHATE SERPL-MCNC: 3.9 MG/DL (ref 2.5–4.5)
PLATELET # BLD AUTO: 489 10*3/MM3 (ref 140–450)
PMV BLD AUTO: 9.9 FL (ref 6–12)
POTASSIUM SERPL-SCNC: 5.1 MMOL/L (ref 3.5–5.2)
PROT SERPL-MCNC: 8.1 G/DL (ref 6–8.5)
RBC # BLD AUTO: 5.2 10*6/MM3 (ref 3.77–5.28)
SODIUM SERPL-SCNC: 129 MMOL/L (ref 136–145)
WBC NRBC COR # BLD: 15.6 10*3/MM3 (ref 3.4–10.8)

## 2023-02-01 PROCEDURE — 83036 HEMOGLOBIN GLYCOSYLATED A1C: CPT | Performed by: EMERGENCY MEDICINE

## 2023-02-01 PROCEDURE — 83930 ASSAY OF BLOOD OSMOLALITY: CPT | Performed by: EMERGENCY MEDICINE

## 2023-02-01 PROCEDURE — 05HB33Z INSERTION OF INFUSION DEVICE INTO RIGHT BASILIC VEIN, PERCUTANEOUS APPROACH: ICD-10-PCS | Performed by: RADIOLOGY

## 2023-02-01 PROCEDURE — 25010000002 PROCHLORPERAZINE 10 MG/2ML SOLUTION: Performed by: INTERNAL MEDICINE

## 2023-02-01 PROCEDURE — 93010 ELECTROCARDIOGRAM REPORT: CPT | Performed by: INTERNAL MEDICINE

## 2023-02-01 PROCEDURE — 93005 ELECTROCARDIOGRAM TRACING: CPT | Performed by: INTERNAL MEDICINE

## 2023-02-01 PROCEDURE — C1751 CATH, INF, PER/CENT/MIDLINE: HCPCS

## 2023-02-01 PROCEDURE — 36415 COLL VENOUS BLD VENIPUNCTURE: CPT

## 2023-02-01 PROCEDURE — 0 INSULIN REGULAR HUMAN PER 5 UNITS: Performed by: EMERGENCY MEDICINE

## 2023-02-01 PROCEDURE — 82962 GLUCOSE BLOOD TEST: CPT

## 2023-02-01 PROCEDURE — 25010000002 MAGNESIUM SULFATE 2 GM/50ML SOLUTION: Performed by: EMERGENCY MEDICINE

## 2023-02-01 PROCEDURE — B54MZZA ULTRASONOGRAPHY OF RIGHT UPPER EXTREMITY VEINS, GUIDANCE: ICD-10-PCS | Performed by: RADIOLOGY

## 2023-02-01 PROCEDURE — 76937 US GUIDE VASCULAR ACCESS: CPT

## 2023-02-01 PROCEDURE — 99285 EMERGENCY DEPT VISIT HI MDM: CPT

## 2023-02-01 PROCEDURE — 36410 VNPNXR 3YR/> PHY/QHP DX/THER: CPT

## 2023-02-01 PROCEDURE — 83735 ASSAY OF MAGNESIUM: CPT | Performed by: EMERGENCY MEDICINE

## 2023-02-01 PROCEDURE — 0 INSULIN REGULAR HUMAN PER 5 UNITS: Performed by: STUDENT IN AN ORGANIZED HEALTH CARE EDUCATION/TRAINING PROGRAM

## 2023-02-01 PROCEDURE — 85025 COMPLETE CBC W/AUTO DIFF WBC: CPT | Performed by: EMERGENCY MEDICINE

## 2023-02-01 PROCEDURE — 71045 X-RAY EXAM CHEST 1 VIEW: CPT

## 2023-02-01 PROCEDURE — 80053 COMPREHEN METABOLIC PANEL: CPT | Performed by: EMERGENCY MEDICINE

## 2023-02-01 PROCEDURE — 25010000002 ONDANSETRON PER 1 MG: Performed by: EMERGENCY MEDICINE

## 2023-02-01 PROCEDURE — 84100 ASSAY OF PHOSPHORUS: CPT | Performed by: EMERGENCY MEDICINE

## 2023-02-01 PROCEDURE — 82009 KETONE BODYS QUAL: CPT | Performed by: EMERGENCY MEDICINE

## 2023-02-01 RX ORDER — SODIUM CHLORIDE AND POTASSIUM CHLORIDE 300; 900 MG/100ML; MG/100ML
250 INJECTION, SOLUTION INTRAVENOUS CONTINUOUS PRN
Status: DISCONTINUED | OUTPATIENT
Start: 2023-02-01 | End: 2023-02-01 | Stop reason: SDUPTHER

## 2023-02-01 RX ORDER — SODIUM CHLORIDE 450 MG/100ML
250 INJECTION, SOLUTION INTRAVENOUS CONTINUOUS PRN
Status: DISCONTINUED | OUTPATIENT
Start: 2023-02-01 | End: 2023-02-02

## 2023-02-01 RX ORDER — NICOTINE POLACRILEX 4 MG
15 LOZENGE BUCCAL
Status: DISCONTINUED | OUTPATIENT
Start: 2023-02-01 | End: 2023-02-01 | Stop reason: SDUPTHER

## 2023-02-01 RX ORDER — SODIUM CHLORIDE AND POTASSIUM CHLORIDE 150; 450 MG/100ML; MG/100ML
250 INJECTION, SOLUTION INTRAVENOUS CONTINUOUS PRN
Status: DISCONTINUED | OUTPATIENT
Start: 2023-02-01 | End: 2023-02-01 | Stop reason: SDUPTHER

## 2023-02-01 RX ORDER — DEXTROSE AND SODIUM CHLORIDE 5; .45 G/100ML; G/100ML
150 INJECTION, SOLUTION INTRAVENOUS CONTINUOUS PRN
Status: DISCONTINUED | OUTPATIENT
Start: 2023-02-01 | End: 2023-02-01 | Stop reason: SDUPTHER

## 2023-02-01 RX ORDER — SODIUM CHLORIDE 9 MG/ML
40 INJECTION, SOLUTION INTRAVENOUS AS NEEDED
Status: DISCONTINUED | OUTPATIENT
Start: 2023-02-01 | End: 2023-02-04 | Stop reason: HOSPADM

## 2023-02-01 RX ORDER — ENOXAPARIN SODIUM 100 MG/ML
40 INJECTION SUBCUTANEOUS DAILY
Status: DISCONTINUED | OUTPATIENT
Start: 2023-02-01 | End: 2023-02-04 | Stop reason: HOSPADM

## 2023-02-01 RX ORDER — MAGNESIUM SULFATE HEPTAHYDRATE 40 MG/ML
2 INJECTION, SOLUTION INTRAVENOUS ONCE
Status: COMPLETED | OUTPATIENT
Start: 2023-02-01 | End: 2023-02-01

## 2023-02-01 RX ORDER — SODIUM CHLORIDE 0.9 % (FLUSH) 0.9 %
10 SYRINGE (ML) INJECTION AS NEEDED
Status: DISCONTINUED | OUTPATIENT
Start: 2023-02-01 | End: 2023-02-02

## 2023-02-01 RX ORDER — SODIUM CHLORIDE 0.9 % (FLUSH) 0.9 %
10 SYRINGE (ML) INJECTION AS NEEDED
Status: DISCONTINUED | OUTPATIENT
Start: 2023-02-01 | End: 2023-02-01 | Stop reason: SDUPTHER

## 2023-02-01 RX ORDER — POTASSIUM CHLORIDE, DEXTROSE MONOHYDRATE AND SODIUM CHLORIDE 300; 5; 900 MG/100ML; G/100ML; MG/100ML
150 INJECTION, SOLUTION INTRAVENOUS CONTINUOUS PRN
Status: DISCONTINUED | OUTPATIENT
Start: 2023-02-01 | End: 2023-02-02

## 2023-02-01 RX ORDER — DEXTROSE MONOHYDRATE 25 G/50ML
10-50 INJECTION, SOLUTION INTRAVENOUS
Status: DISCONTINUED | OUTPATIENT
Start: 2023-02-01 | End: 2023-02-01 | Stop reason: SDUPTHER

## 2023-02-01 RX ORDER — ONDANSETRON 2 MG/ML
4 INJECTION INTRAMUSCULAR; INTRAVENOUS EVERY 6 HOURS PRN
Status: DISCONTINUED | OUTPATIENT
Start: 2023-02-01 | End: 2023-02-01 | Stop reason: SDUPTHER

## 2023-02-01 RX ORDER — DEXTROSE AND SODIUM CHLORIDE 5; .9 G/100ML; G/100ML
150 INJECTION, SOLUTION INTRAVENOUS CONTINUOUS PRN
Status: DISCONTINUED | OUTPATIENT
Start: 2023-02-01 | End: 2023-02-02

## 2023-02-01 RX ORDER — SODIUM CHLORIDE 0.9 % (FLUSH) 0.9 %
10 SYRINGE (ML) INJECTION EVERY 12 HOURS SCHEDULED
Status: DISCONTINUED | OUTPATIENT
Start: 2023-02-01 | End: 2023-02-02

## 2023-02-01 RX ORDER — DEXTROSE AND SODIUM CHLORIDE 5; .45 G/100ML; G/100ML
150 INJECTION, SOLUTION INTRAVENOUS CONTINUOUS PRN
Status: DISCONTINUED | OUTPATIENT
Start: 2023-02-01 | End: 2023-02-02

## 2023-02-01 RX ORDER — SODIUM CHLORIDE AND POTASSIUM CHLORIDE 150; 450 MG/100ML; MG/100ML
250 INJECTION, SOLUTION INTRAVENOUS CONTINUOUS PRN
Status: DISCONTINUED | OUTPATIENT
Start: 2023-02-01 | End: 2023-02-02

## 2023-02-01 RX ORDER — PROCHLORPERAZINE EDISYLATE 5 MG/ML
5 INJECTION INTRAMUSCULAR; INTRAVENOUS EVERY 6 HOURS PRN
Status: DISCONTINUED | OUTPATIENT
Start: 2023-02-01 | End: 2023-02-01

## 2023-02-01 RX ORDER — POTASSIUM CHLORIDE, DEXTROSE MONOHYDRATE AND SODIUM CHLORIDE 300; 5; 900 MG/100ML; G/100ML; MG/100ML
150 INJECTION, SOLUTION INTRAVENOUS CONTINUOUS PRN
Status: DISCONTINUED | OUTPATIENT
Start: 2023-02-01 | End: 2023-02-01 | Stop reason: SDUPTHER

## 2023-02-01 RX ORDER — SODIUM CHLORIDE AND POTASSIUM CHLORIDE 150; 900 MG/100ML; MG/100ML
250 INJECTION, SOLUTION INTRAVENOUS CONTINUOUS PRN
Status: DISCONTINUED | OUTPATIENT
Start: 2023-02-01 | End: 2023-02-01 | Stop reason: SDUPTHER

## 2023-02-01 RX ORDER — DEXTROSE MONOHYDRATE 25 G/50ML
10-50 INJECTION, SOLUTION INTRAVENOUS
Status: DISCONTINUED | OUTPATIENT
Start: 2023-02-01 | End: 2023-02-02

## 2023-02-01 RX ORDER — DEXTROSE, SODIUM CHLORIDE, AND POTASSIUM CHLORIDE 5; .45; .3 G/100ML; G/100ML; G/100ML
150 INJECTION INTRAVENOUS CONTINUOUS PRN
Status: DISCONTINUED | OUTPATIENT
Start: 2023-02-01 | End: 2023-02-02

## 2023-02-01 RX ORDER — SODIUM CHLORIDE 9 MG/ML
40 INJECTION, SOLUTION INTRAVENOUS AS NEEDED
Status: DISCONTINUED | OUTPATIENT
Start: 2023-02-01 | End: 2023-02-02

## 2023-02-01 RX ORDER — DEXTROSE, SODIUM CHLORIDE, AND POTASSIUM CHLORIDE 5; .9; .15 G/100ML; G/100ML; G/100ML
150 INJECTION INTRAVENOUS CONTINUOUS PRN
Status: DISCONTINUED | OUTPATIENT
Start: 2023-02-01 | End: 2023-02-02

## 2023-02-01 RX ORDER — SODIUM CHLORIDE 9 MG/ML
250 INJECTION, SOLUTION INTRAVENOUS CONTINUOUS PRN
Status: DISCONTINUED | OUTPATIENT
Start: 2023-02-01 | End: 2023-02-01 | Stop reason: SDUPTHER

## 2023-02-01 RX ORDER — DEXTROSE, SODIUM CHLORIDE, AND POTASSIUM CHLORIDE 5; .45; .3 G/100ML; G/100ML; G/100ML
150 INJECTION INTRAVENOUS CONTINUOUS PRN
Status: DISCONTINUED | OUTPATIENT
Start: 2023-02-01 | End: 2023-02-01 | Stop reason: SDUPTHER

## 2023-02-01 RX ORDER — ONDANSETRON 2 MG/ML
4 INJECTION INTRAMUSCULAR; INTRAVENOUS EVERY 6 HOURS PRN
Status: DISCONTINUED | OUTPATIENT
Start: 2023-02-01 | End: 2023-02-01

## 2023-02-01 RX ORDER — SODIUM CHLORIDE 450 MG/100ML
250 INJECTION, SOLUTION INTRAVENOUS CONTINUOUS PRN
Status: DISCONTINUED | OUTPATIENT
Start: 2023-02-01 | End: 2023-02-01 | Stop reason: SDUPTHER

## 2023-02-01 RX ORDER — SODIUM CHLORIDE AND POTASSIUM CHLORIDE 150; 900 MG/100ML; MG/100ML
250 INJECTION, SOLUTION INTRAVENOUS CONTINUOUS PRN
Status: DISCONTINUED | OUTPATIENT
Start: 2023-02-01 | End: 2023-02-02

## 2023-02-01 RX ORDER — DEXTROSE, SODIUM CHLORIDE, AND POTASSIUM CHLORIDE 5; .45; .15 G/100ML; G/100ML; G/100ML
150 INJECTION INTRAVENOUS CONTINUOUS PRN
Status: DISCONTINUED | OUTPATIENT
Start: 2023-02-01 | End: 2023-02-02

## 2023-02-01 RX ORDER — ONDANSETRON 2 MG/ML
4 INJECTION INTRAMUSCULAR; INTRAVENOUS ONCE
Status: COMPLETED | OUTPATIENT
Start: 2023-02-01 | End: 2023-02-01

## 2023-02-01 RX ORDER — SODIUM CHLORIDE 9 MG/ML
40 INJECTION, SOLUTION INTRAVENOUS AS NEEDED
Status: DISCONTINUED | OUTPATIENT
Start: 2023-02-01 | End: 2023-02-01 | Stop reason: SDUPTHER

## 2023-02-01 RX ORDER — SODIUM CHLORIDE 9 MG/ML
250 INJECTION, SOLUTION INTRAVENOUS CONTINUOUS PRN
Status: DISCONTINUED | OUTPATIENT
Start: 2023-02-01 | End: 2023-02-02

## 2023-02-01 RX ORDER — DEXTROSE, SODIUM CHLORIDE, AND POTASSIUM CHLORIDE 5; .9; .15 G/100ML; G/100ML; G/100ML
150 INJECTION INTRAVENOUS CONTINUOUS PRN
Status: DISCONTINUED | OUTPATIENT
Start: 2023-02-01 | End: 2023-02-01 | Stop reason: SDUPTHER

## 2023-02-01 RX ORDER — NICOTINE POLACRILEX 4 MG
15 LOZENGE BUCCAL
Status: DISCONTINUED | OUTPATIENT
Start: 2023-02-01 | End: 2023-02-02

## 2023-02-01 RX ORDER — SODIUM CHLORIDE 0.9 % (FLUSH) 0.9 %
10 SYRINGE (ML) INJECTION EVERY 12 HOURS SCHEDULED
Status: DISCONTINUED | OUTPATIENT
Start: 2023-02-01 | End: 2023-02-01 | Stop reason: SDUPTHER

## 2023-02-01 RX ORDER — SODIUM CHLORIDE 0.9 % (FLUSH) 0.9 %
10 SYRINGE (ML) INJECTION EVERY 12 HOURS SCHEDULED
Status: DISCONTINUED | OUTPATIENT
Start: 2023-02-01 | End: 2023-02-04 | Stop reason: HOSPADM

## 2023-02-01 RX ORDER — DEXTROSE AND SODIUM CHLORIDE 5; .9 G/100ML; G/100ML
150 INJECTION, SOLUTION INTRAVENOUS CONTINUOUS PRN
Status: DISCONTINUED | OUTPATIENT
Start: 2023-02-01 | End: 2023-02-01 | Stop reason: SDUPTHER

## 2023-02-01 RX ORDER — DEXTROSE, SODIUM CHLORIDE, AND POTASSIUM CHLORIDE 5; .45; .15 G/100ML; G/100ML; G/100ML
150 INJECTION INTRAVENOUS CONTINUOUS PRN
Status: DISCONTINUED | OUTPATIENT
Start: 2023-02-01 | End: 2023-02-01 | Stop reason: SDUPTHER

## 2023-02-01 RX ORDER — SODIUM CHLORIDE AND POTASSIUM CHLORIDE 300; 900 MG/100ML; MG/100ML
250 INJECTION, SOLUTION INTRAVENOUS CONTINUOUS PRN
Status: DISCONTINUED | OUTPATIENT
Start: 2023-02-01 | End: 2023-02-02

## 2023-02-01 RX ADMIN — PROCHLORPERAZINE EDISYLATE 5 MG: 5 INJECTION INTRAMUSCULAR; INTRAVENOUS at 21:06

## 2023-02-01 RX ADMIN — Medication 10 ML: at 20:56

## 2023-02-01 RX ADMIN — DEXTROSE AND SODIUM CHLORIDE 150 ML/HR: 5; 450 INJECTION, SOLUTION INTRAVENOUS at 21:33

## 2023-02-01 RX ADMIN — ONDANSETRON 4 MG: 2 INJECTION INTRAMUSCULAR; INTRAVENOUS at 16:44

## 2023-02-01 RX ADMIN — SODIUM CHLORIDE 2.3 UNITS/HR: 9 INJECTION, SOLUTION INTRAVENOUS at 17:16

## 2023-02-01 RX ADMIN — DEXTROSE AND SODIUM CHLORIDE 150 ML/HR: 5; 900 INJECTION, SOLUTION INTRAVENOUS at 19:46

## 2023-02-01 RX ADMIN — SODIUM CHLORIDE 1000 ML/HR: 9 INJECTION, SOLUTION INTRAVENOUS at 16:44

## 2023-02-01 RX ADMIN — MAGNESIUM SULFATE HEPTAHYDRATE 2 G: 40 INJECTION, SOLUTION INTRAVENOUS at 20:13

## 2023-02-01 RX ADMIN — SODIUM CHLORIDE 3 UNITS/HR: 9 INJECTION, SOLUTION INTRAVENOUS at 18:25

## 2023-02-01 NOTE — ED PROVIDER NOTES
Subjective   History of Present Illness  Patient presents emergency department with complaints of nausea vomiting.  Patient with sugars in the 360 range.  Patient notes high ketones at home.  Patient has had several episodes of diabetic ketoacidosis in the past.  This does feel similar.  Patient has been taking her insulin but had missed NPH 2 nights before last night.  Patient actually had her NPH last night and awoke with the symptoms in the morning.  Patient denies any recent illnesses, no dysuria, no cough.  Patient has had some nasal congestion.  Patient denies pregnancy and is taking oral contraceptives.  There is a known type 1 diabetes.        Review of Systems   Constitutional: Positive for activity change, appetite change and fatigue. Negative for chills and fever.   HENT: Negative.  Negative for congestion.    Eyes: Negative.  Negative for photophobia and visual disturbance.   Respiratory: Negative.  Negative for cough, chest tightness and shortness of breath.    Cardiovascular: Negative.  Negative for chest pain and palpitations.   Gastrointestinal: Positive for nausea and vomiting. Negative for abdominal pain, constipation and diarrhea.   Endocrine: Negative.    Genitourinary: Negative.  Negative for decreased urine volume, dysuria, flank pain and hematuria.   Musculoskeletal: Negative.  Negative for arthralgias, back pain, myalgias, neck pain and neck stiffness.   Skin: Negative.  Negative for pallor.   Neurological: Negative.  Negative for dizziness, syncope, weakness, light-headedness, numbness and headaches.   Psychiatric/Behavioral: Negative.  Negative for confusion and suicidal ideas. The patient is not nervous/anxious.    All other systems reviewed and are negative.      Past Medical History:   Diagnosis Date   • Abdominal pain    • Acute bronchitis    • Acute pharyngitis    • Allergic rhinitis    • Asteatotic eczema    • Carbuncle of buttock    • Chalazion     - right upper and lower eyelids   •  Conjunctivitis    • Constipation    • Contact dermatitis    • Diabetic ketoacidosis (HCC)     - history of   • Diarrhea    • DKA (diabetic ketoacidoses)    • Esotropia    • Fatigue    • Folliculitis    • Hordeolum internum of right lower eyelid    • Influenza    • Laceration of skin of chin    • Nausea and vomiting    • Otitis media    • Tibial torsion      - left leg   • Type 1 diabetes mellitus (HCC)    • Vitamin D deficiency        Allergies   Allergen Reactions   • Cefprozil Hives       Past Surgical History:   Procedure Laterality Date   • CRYOTHERAPY  10/13/2010    acne   • OTHER SURGICAL HISTORY  05/04/2011    Remove Impacted Cerumen 78529        Family History   Problem Relation Age of Onset   • Breast cancer Maternal Grandmother    • Hypertension Mother    • Asthma Sister    • Heart disease Maternal Grandfather    • Colon cancer Neg Hx    • Endometrial cancer Neg Hx    • Ovarian cancer Neg Hx        Social History     Socioeconomic History   • Marital status:    Tobacco Use   • Smoking status: Never   • Smokeless tobacco: Never   Substance and Sexual Activity   • Alcohol use: No   • Drug use: No   • Sexual activity: Never           Objective   Physical Exam  Vitals and nursing note reviewed.   Constitutional:       General: She is in acute distress.      Appearance: She is well-developed. She is ill-appearing. She is not diaphoretic.   HENT:      Head: Normocephalic and atraumatic.   Cardiovascular:      Rate and Rhythm: Tachycardia present.      Heart sounds: Normal heart sounds.   Pulmonary:      Effort: Pulmonary effort is normal. No respiratory distress.      Breath sounds: No wheezing.   Abdominal:      General: Bowel sounds are normal.      Palpations: Abdomen is soft.   Skin:     General: Skin is warm and dry.      Capillary Refill: Capillary refill takes less than 2 seconds.   Neurological:      General: No focal deficit present.      Mental Status: She is alert and oriented to person, place,  and time.      Motor: No weakness.   Psychiatric:         Mood and Affect: Mood normal.         Behavior: Behavior normal.         Procedures           ED Course                                         Labs Reviewed   COMPREHENSIVE METABOLIC PANEL - Abnormal; Notable for the following components:       Result Value    Glucose 364 (*)     Sodium 129 (*)     Chloride 97 (*)     CO2 7.0 (*)     Anion Gap 25.0 (*)     All other components within normal limits    Narrative:     GFR Normal >60  Chronic Kidney Disease <60  Kidney Failure <15     MAGNESIUM - Abnormal; Notable for the following components:    Magnesium 1.5 (*)     All other components within normal limits   OSMOLALITY, SERUM - Abnormal; Notable for the following components:    Osmolality 304 (*)     All other components within normal limits   HEMOGLOBIN A1C - Abnormal; Notable for the following components:    Hemoglobin A1C 11.30 (*)     All other components within normal limits    Narrative:     Hemoglobin A1C Ranges:    Increased Risk for Diabetes  5.7% to 6.4%  Diabetes                     >= 6.5%  Diabetic Goal                < 7.0%   CBC WITH AUTO DIFFERENTIAL - Abnormal; Notable for the following components:    WBC 15.60 (*)     Hematocrit 46.9 (*)     Platelets 489 (*)     Neutrophil % 80.8 (*)     Lymphocyte % 12.2 (*)     Eosinophil % 0.1 (*)     Immature Grans % 0.6 (*)     Neutrophils, Absolute 12.60 (*)     Immature Grans, Absolute 0.10 (*)     All other components within normal limits   ACETONE - Abnormal; Notable for the following components:    Acetone Large (*)     All other components within normal limits   POCT GLUCOSE FINGERSTICK - Abnormal; Notable for the following components:    Glucose 348 (*)     All other components within normal limits   POCT GLUCOSE FINGERSTICK - Abnormal; Notable for the following components:    Glucose 290 (*)     All other components within normal limits   PHOSPHORUS - Normal   BASIC METABOLIC PANEL   MAGNESIUM    PHOSPHORUS   BLOOD GAS, VENOUS   URINALYSIS W/ MICROSCOPIC IF INDICATED (NO CULTURE)   CBC AND DIFFERENTIAL    Narrative:     The following orders were created for panel order CBC & Differential.  Procedure                               Abnormality         Status                     ---------                               -----------         ------                     CBC Auto Differential[[id number]]        Abnormal            Final result                 Please view results for these tests on the individual orders.   KETONE BODIES SERUM    Narrative:     The following orders were created for panel order Ketone Bodies, Serum (Not performed at [facility]).  Procedure                               Abnormality         Status                     ---------                               -----------         ------                     Acetone[[id number]]                      Abnormal            Final result                 Please view results for these tests on the individual orders.       IR Insert Midline Without Port Pump 5 Plus   Final Result      XR Chest 1 View   Final Result   CONCLUSION:   Normal portable chest      [id number]      Electronically signed by:  Gucci Vergara MD  2/1/2023 4:28 PM CST   Workstation: [id number]      US Guided Vascular Access    (Results Pending)           Medical Decision Making  Patient with signs and symptoms consistent with DKA.  Patient's drip started in the ED.  Still awaiting urinalysis.  Some hypomagnesemia though potassium will be replaced as needed with decreasing acidemia.  Patient will be placed in the ICU under hospitalist care.  Seen and evaluated by Dr. Ferrer.    Diabetic ketoacidosis without coma associated with type 1 diabetes mellitus (HCC): chronic illness or injury  Hypomagnesemia: complicated acute illness or injury  Amount and/or Complexity of Data Reviewed  Labs: ordered.  Radiology: ordered.  Discussion of management or test interpretation with external provider(s):   Luis Ferrerist.      Risk  OTC drugs.  Prescription drug management.  Decision regarding hospitalization.          Final diagnoses:   Diabetic ketoacidosis without coma associated with type 1 diabetes mellitus (HCC)   Hypomagnesemia       ED Disposition  ED Disposition     ED Disposition   Decision to Admit    Condition   --    Comment   Level of Care: Med/Surg [1]   Diagnosis: Diabetic ketoacidosis without coma associated with type 1 diabetes mellitus (HCC) [[id number]]   Admitting Physician: MARÍA FERRER [[id number]]   Attending Physician: MARÍA FERRER [[id number]]   Certification: I Certify That Inpatient Hospital Services Are Medically Necessary For Greater Than 2 Midnights               No follow-up provider specified.       Medication List      No changes were made to your prescriptions during this visit.          Jase Timmons MD  02/01/23 6398

## 2023-02-01 NOTE — PROGRESS NOTES
TWO PATIENT IDENTIFIERS WERE USED. THE PATIENT WAS DRAPED WITH A FULL BODY DRAPE AND THE PATIENT'S RIGHT ARM WAS PREPPED WITH CHLORA PREP. ULTRASOUND WAS USED TO LOCALIZE THERIGHT BASILIC VEIN. SUBCUTANEOUS TISSUE AT THE CATHETER SITE WAS INFILTRATED WITH 2% LIDOCAINE. UNDER ULTRASOUND GUIDANCE, THE VEIN WAS ACCESSED WITH A 21 GAUGE  NEEDLE. AN 0.018 WIRE WAS THEN THREADED THROUGH THE NEEDLE. THE 21 GAUGE NEEDLE WAS REMOVED AND A 4 Danish SHEATH WAS PLACED OVER THE WIRE INTO THE VEIN.THE MIDLINE CATHETER WAS TRIMMED TO 18CM. THE MIDLINE CATHETER WAS THEN PLACED OVER THE WIRE INTO THE VEIN, THE SHEATH WAS PEELED AWAY, WIRE WAS REMOVED. CATHETER WAS FLUSHED WITH NORMAL SALINE AND CATHETER TIP APPLIED. BIOPATCH PLACED. CATHETER SECURED WITH STAT LOCK AND TEGADERM. PATIENT TOLERATED PROCEDURE WELL. THIS WAS DONE AT BEDSIDE      IMPRESSION:SUCCESSFUL PLACEMENT OF DUAL LUMEN MIDLINE.           Mela Mercer RN  2/1/2023  17:35 CST

## 2023-02-02 ENCOUNTER — TELEPHONE (OUTPATIENT)
Dept: ENDOCRINOLOGY | Facility: CLINIC | Age: 23
End: 2023-02-02
Payer: COMMERCIAL

## 2023-02-02 LAB
ACETONE BLD QL: ABNORMAL
ANION GAP SERPL CALCULATED.3IONS-SCNC: 12 MMOL/L (ref 5–15)
ANION GAP SERPL CALCULATED.3IONS-SCNC: 18 MMOL/L (ref 5–15)
ANION GAP SERPL CALCULATED.3IONS-SCNC: 7 MMOL/L (ref 5–15)
BASOPHILS # BLD AUTO: 0.02 10*3/MM3 (ref 0–0.2)
BASOPHILS NFR BLD AUTO: 0.2 % (ref 0–1.5)
BUN SERPL-MCNC: 8 MG/DL (ref 6–20)
BUN SERPL-MCNC: 9 MG/DL (ref 6–20)
BUN SERPL-MCNC: 9 MG/DL (ref 6–20)
BUN/CREAT SERPL: 11.1 (ref 7–25)
BUN/CREAT SERPL: 15.1 (ref 7–25)
BUN/CREAT SERPL: 15.5 (ref 7–25)
CALCIUM SPEC-SCNC: 8.3 MG/DL (ref 8.6–10.5)
CHLORIDE SERPL-SCNC: 103 MMOL/L (ref 98–107)
CHLORIDE SERPL-SCNC: 104 MMOL/L (ref 98–107)
CHLORIDE SERPL-SCNC: 106 MMOL/L (ref 98–107)
CO2 SERPL-SCNC: 11 MMOL/L (ref 22–29)
CO2 SERPL-SCNC: 15 MMOL/L (ref 22–29)
CO2 SERPL-SCNC: 19 MMOL/L (ref 22–29)
CREAT SERPL-MCNC: 0.53 MG/DL (ref 0.57–1)
CREAT SERPL-MCNC: 0.58 MG/DL (ref 0.57–1)
CREAT SERPL-MCNC: 0.81 MG/DL (ref 0.57–1)
DEPRECATED RDW RBC AUTO: 39.1 FL (ref 37–54)
EGFRCR SERPLBLD CKD-EPI 2021: 105.4 ML/MIN/1.73
EGFRCR SERPLBLD CKD-EPI 2021: 131.4 ML/MIN/1.73
EGFRCR SERPLBLD CKD-EPI 2021: 134.3 ML/MIN/1.73
EOSINOPHIL # BLD AUTO: 0 10*3/MM3 (ref 0–0.4)
EOSINOPHIL NFR BLD AUTO: 0 % (ref 0.3–6.2)
ERYTHROCYTE [DISTWIDTH] IN BLOOD BY AUTOMATED COUNT: 12 % (ref 12.3–15.4)
GLUCOSE BLDC GLUCOMTR-MCNC: 105 MG/DL (ref 70–130)
GLUCOSE BLDC GLUCOMTR-MCNC: 127 MG/DL (ref 70–130)
GLUCOSE BLDC GLUCOMTR-MCNC: 163 MG/DL (ref 70–130)
GLUCOSE BLDC GLUCOMTR-MCNC: 163 MG/DL (ref 70–130)
GLUCOSE BLDC GLUCOMTR-MCNC: 181 MG/DL (ref 70–130)
GLUCOSE BLDC GLUCOMTR-MCNC: 195 MG/DL (ref 70–130)
GLUCOSE BLDC GLUCOMTR-MCNC: 204 MG/DL (ref 70–130)
GLUCOSE BLDC GLUCOMTR-MCNC: 208 MG/DL (ref 70–130)
GLUCOSE BLDC GLUCOMTR-MCNC: 213 MG/DL (ref 70–130)
GLUCOSE BLDC GLUCOMTR-MCNC: 217 MG/DL (ref 70–130)
GLUCOSE BLDC GLUCOMTR-MCNC: 221 MG/DL (ref 70–130)
GLUCOSE BLDC GLUCOMTR-MCNC: 266 MG/DL (ref 70–130)
GLUCOSE BLDC GLUCOMTR-MCNC: 282 MG/DL (ref 70–130)
GLUCOSE BLDC GLUCOMTR-MCNC: 296 MG/DL (ref 70–130)
GLUCOSE BLDC GLUCOMTR-MCNC: 318 MG/DL (ref 70–130)
GLUCOSE BLDC GLUCOMTR-MCNC: 349 MG/DL (ref 70–130)
GLUCOSE BLDC GLUCOMTR-MCNC: 95 MG/DL (ref 70–130)
GLUCOSE SERPL-MCNC: 116 MG/DL (ref 65–99)
GLUCOSE SERPL-MCNC: 193 MG/DL (ref 65–99)
GLUCOSE SERPL-MCNC: 226 MG/DL (ref 65–99)
HCT VFR BLD AUTO: 40.9 % (ref 34–46.6)
HGB BLD-MCNC: 13.5 G/DL (ref 12–15.9)
IMM GRANULOCYTES # BLD AUTO: 0.03 10*3/MM3 (ref 0–0.05)
IMM GRANULOCYTES NFR BLD AUTO: 0.2 % (ref 0–0.5)
LYMPHOCYTES # BLD AUTO: 1.48 10*3/MM3 (ref 0.7–3.1)
LYMPHOCYTES NFR BLD AUTO: 11.2 % (ref 19.6–45.3)
MAGNESIUM SERPL-MCNC: 2 MG/DL (ref 1.6–2.6)
MCH RBC QN AUTO: 29.6 PG (ref 26.6–33)
MCHC RBC AUTO-ENTMCNC: 33 G/DL (ref 31.5–35.7)
MCV RBC AUTO: 89.7 FL (ref 79–97)
MONOCYTES # BLD AUTO: 1.13 10*3/MM3 (ref 0.1–0.9)
MONOCYTES NFR BLD AUTO: 8.5 % (ref 5–12)
NEUTROPHILS NFR BLD AUTO: 10.58 10*3/MM3 (ref 1.7–7)
NEUTROPHILS NFR BLD AUTO: 79.9 % (ref 42.7–76)
NRBC BLD AUTO-RTO: 0 /100 WBC (ref 0–0.2)
PHOSPHATE SERPL-MCNC: 1.3 MG/DL (ref 2.5–4.5)
PHOSPHATE SERPL-MCNC: 1.5 MG/DL (ref 2.5–4.5)
PHOSPHATE SERPL-MCNC: 2.2 MG/DL (ref 2.5–4.5)
PHOSPHATE SERPL-MCNC: 2.3 MG/DL (ref 2.5–4.5)
PLATELET # BLD AUTO: 450 10*3/MM3 (ref 140–450)
PMV BLD AUTO: 9.3 FL (ref 6–12)
POTASSIUM SERPL-SCNC: 3.4 MMOL/L (ref 3.5–5.2)
POTASSIUM SERPL-SCNC: 3.8 MMOL/L (ref 3.5–5.2)
POTASSIUM SERPL-SCNC: 4 MMOL/L (ref 3.5–5.2)
POTASSIUM SERPL-SCNC: 4.1 MMOL/L (ref 3.5–5.2)
QT INTERVAL: 300 MS
QTC INTERVAL: 457 MS
RBC # BLD AUTO: 4.56 10*6/MM3 (ref 3.77–5.28)
SODIUM SERPL-SCNC: 130 MMOL/L (ref 136–145)
SODIUM SERPL-SCNC: 132 MMOL/L (ref 136–145)
SODIUM SERPL-SCNC: 133 MMOL/L (ref 136–145)
WBC NRBC COR # BLD: 13.24 10*3/MM3 (ref 3.4–10.8)
WHOLE BLOOD HOLD SPECIMEN: NORMAL

## 2023-02-02 PROCEDURE — 82009 KETONE BODYS QUAL: CPT | Performed by: INTERNAL MEDICINE

## 2023-02-02 PROCEDURE — 80048 BASIC METABOLIC PNL TOTAL CA: CPT | Performed by: STUDENT IN AN ORGANIZED HEALTH CARE EDUCATION/TRAINING PROGRAM

## 2023-02-02 PROCEDURE — 84100 ASSAY OF PHOSPHORUS: CPT | Performed by: STUDENT IN AN ORGANIZED HEALTH CARE EDUCATION/TRAINING PROGRAM

## 2023-02-02 PROCEDURE — 25010000002 ENOXAPARIN PER 10 MG: Performed by: STUDENT IN AN ORGANIZED HEALTH CARE EDUCATION/TRAINING PROGRAM

## 2023-02-02 PROCEDURE — 84132 ASSAY OF SERUM POTASSIUM: CPT | Performed by: STUDENT IN AN ORGANIZED HEALTH CARE EDUCATION/TRAINING PROGRAM

## 2023-02-02 PROCEDURE — 82962 GLUCOSE BLOOD TEST: CPT

## 2023-02-02 PROCEDURE — 63710000001 INSULIN ASPART PER 5 UNITS: Performed by: STUDENT IN AN ORGANIZED HEALTH CARE EDUCATION/TRAINING PROGRAM

## 2023-02-02 PROCEDURE — 63710000001 INSULIN DETEMIR PER 5 UNITS: Performed by: STUDENT IN AN ORGANIZED HEALTH CARE EDUCATION/TRAINING PROGRAM

## 2023-02-02 PROCEDURE — 85025 COMPLETE CBC W/AUTO DIFF WBC: CPT | Performed by: STUDENT IN AN ORGANIZED HEALTH CARE EDUCATION/TRAINING PROGRAM

## 2023-02-02 PROCEDURE — 83735 ASSAY OF MAGNESIUM: CPT | Performed by: STUDENT IN AN ORGANIZED HEALTH CARE EDUCATION/TRAINING PROGRAM

## 2023-02-02 RX ORDER — DEXTROSE MONOHYDRATE 25 G/50ML
25 INJECTION, SOLUTION INTRAVENOUS
Status: DISCONTINUED | OUTPATIENT
Start: 2023-02-02 | End: 2023-02-04 | Stop reason: HOSPADM

## 2023-02-02 RX ORDER — SODIUM CHLORIDE 0.9 % (FLUSH) 0.9 %
10 SYRINGE (ML) INJECTION EVERY 12 HOURS SCHEDULED
Status: DISCONTINUED | OUTPATIENT
Start: 2023-02-02 | End: 2023-02-04 | Stop reason: HOSPADM

## 2023-02-02 RX ORDER — INSULIN ASPART 100 [IU]/ML
0-7 INJECTION, SOLUTION INTRAVENOUS; SUBCUTANEOUS
Status: DISCONTINUED | OUTPATIENT
Start: 2023-02-02 | End: 2023-02-04 | Stop reason: HOSPADM

## 2023-02-02 RX ORDER — NICOTINE POLACRILEX 4 MG
15 LOZENGE BUCCAL
Status: DISCONTINUED | OUTPATIENT
Start: 2023-02-02 | End: 2023-02-04 | Stop reason: HOSPADM

## 2023-02-02 RX ORDER — INSULIN ASPART 100 [IU]/ML
7 INJECTION, SOLUTION INTRAVENOUS; SUBCUTANEOUS ONCE
Status: COMPLETED | OUTPATIENT
Start: 2023-02-03 | End: 2023-02-02

## 2023-02-02 RX ORDER — SODIUM CHLORIDE 0.9 % (FLUSH) 0.9 %
10 SYRINGE (ML) INJECTION AS NEEDED
Status: DISCONTINUED | OUTPATIENT
Start: 2023-02-02 | End: 2023-02-04 | Stop reason: HOSPADM

## 2023-02-02 RX ORDER — SODIUM CHLORIDE 9 MG/ML
40 INJECTION, SOLUTION INTRAVENOUS AS NEEDED
Status: DISCONTINUED | OUTPATIENT
Start: 2023-02-02 | End: 2023-02-04 | Stop reason: HOSPADM

## 2023-02-02 RX ADMIN — INSULIN ASPART 4 UNITS: 100 INJECTION, SOLUTION INTRAVENOUS; SUBCUTANEOUS at 10:59

## 2023-02-02 RX ADMIN — Medication 10 ML: at 21:16

## 2023-02-02 RX ADMIN — POTASSIUM & SODIUM PHOSPHATES POWDER PACK 280-160-250 MG 2 PACKET: 280-160-250 PACK at 08:50

## 2023-02-02 RX ADMIN — INSULIN ASPART 7 UNITS: 100 INJECTION, SOLUTION INTRAVENOUS; SUBCUTANEOUS at 23:20

## 2023-02-02 RX ADMIN — Medication 10 ML: at 08:59

## 2023-02-02 RX ADMIN — Medication 10 ML: at 08:58

## 2023-02-02 RX ADMIN — POTASSIUM CHLORIDE, DEXTROSE MONOHYDRATE AND SODIUM CHLORIDE 150 ML/HR: 150; 5; 450 INJECTION, SOLUTION INTRAVENOUS at 00:31

## 2023-02-02 RX ADMIN — POTASSIUM & SODIUM PHOSPHATES POWDER PACK 280-160-250 MG 2 PACKET: 280-160-250 PACK at 23:46

## 2023-02-02 RX ADMIN — Medication 10 ML: at 21:15

## 2023-02-02 RX ADMIN — POTASSIUM CHLORIDE, DEXTROSE MONOHYDRATE AND SODIUM CHLORIDE 150 ML/HR: 150; 5; 900 INJECTION, SOLUTION INTRAVENOUS at 06:24

## 2023-02-02 RX ADMIN — INSULIN ASPART 4 UNITS: 100 INJECTION, SOLUTION INTRAVENOUS; SUBCUTANEOUS at 17:17

## 2023-02-02 RX ADMIN — INSULIN DETEMIR 26 UNITS: 100 INJECTION, SOLUTION SUBCUTANEOUS at 20:42

## 2023-02-02 RX ADMIN — INSULIN DETEMIR 20 UNITS: 100 INJECTION, SOLUTION SUBCUTANEOUS at 08:49

## 2023-02-02 RX ADMIN — ENOXAPARIN SODIUM 40 MG: 40 INJECTION SUBCUTANEOUS at 08:50

## 2023-02-02 NOTE — PLAN OF CARE
Goal Outcome Evaluation:  Plan of Care Reviewed With: caregiver, patient        Progress: no change  Outcome Evaluation: Nutrition New Assessment:  pt admitted with DKA with hx of Type 1 DM.  She is followed by Endocirinologist Dr. Ellis.  She reports that her A1c is typically high. She denies any need for diet education.  She has had DM since the age of 7.  PO diet just started. Will monitor POC

## 2023-02-02 NOTE — CONSULTS
"Adult Nutrition  Assessment/PES    Patient Name:  Anna García  YOB: 2000  MRN: [id number]  Admit Date:  2/1/2023    Assessment Date:  2/2/2023    Comments:  Nutrition new Assessment:  Pt admitted with DKA requiring Glucommander.  RD visited to assess educational needs.  She has a hx of DM, Type1.  Insulin drip is now off and po diet just started.  A1c elevated on admit of 11.3. She reports that she is followed by Dr. Ellis, Endocrinologist.  She reports a hx of high A1c. She has seen dietitians in the past and denies any questions regarding her diet/carb counting.  She has a good understanding of her diet.  RD encouraged her to call with any questions.  Will continue to monitor POC and po intake.       Reason for Assessment     Row Name 02/02/23 3530          Reason for Assessment    Reason For Assessment diagnosis/disease state     Diagnosis diabetes diagnosis/complications     Identified At Risk by Screening Criteria need for education                Nutrition/Diet History     Row Name 02/02/23 1119          Nutrition/Diet History    Typical Intake (Food/Fluid/EN/PN) Pt reports that she is sees Dr. Nicole.  Her A1c has always been >10.  She has had DM since the age of 7.  She denies any Gi distress at this time.  Finished with her breakfast.                Labs/Tests/Procedures/Meds     Row Name 02/02/23 3123          Labs/Procedures/Meds    Lab Results Reviewed reviewed, pertinent     Lab Results Comments Na 130; A1c 11.30; Phos 1.5; Gluc 301/193/105        Diagnostic Tests/Procedures    Diagnostic Test/Procedure Reviewed reviewed, pertinent     Diagnostic Test/Procedures Comments Glucommander--now off; DKA protocol        Medications    Pertinent Medications Reviewed reviewed, pertinent     Pertinent Medications Comments Levemir; SSI                  Estimated/Assessed Needs - Anthropometrics     Row Name 02/02/23 3027          Anthropometrics    Height for Calculation 1.727 m (5' 8\")  "    Weight for Calculation 63.5 kg (140 lb)        Estimated/Assessed Needs    Additional Documentation Additional Requirements (Group);Fluid Requirements (Group);Modified Alcides State Equation (Group);Estimated Calorie Needs (Group);KCAL/KG (Group);[place]-St. Jeor Equation (Group);Protein Requirements (Group)        Estimated Calorie Needs    Estimated Calorie Need Method kcal/kg        KCAL/KG    KCAL/KG 30 Kcal/Kg (kcal)     30 Kcal/Kg (kcal) 1905.12        [place]-St. Jeor Equation    RMR ([place]-St. Jeor Equation) 1443.54        Protein Requirements    Weight Used For Protein Calculations 63.5 kg (140 lb)     Est Protein Requirement Amount (gms/kg) 1.2 gm protein     Estimated Protein Requirements (gms/day) 76.2        Fluid Requirements    Fluid Requirements (mL/day) 1900     Estimated Fluid Requirement Method RDA Method     RDA Method (mL) 1900                Nutrition Prescription Ordered     Row Name 02/02/23 1400          Nutrition Prescription PO    Current PO Diet Regular     Fluid Consistency Thin     Common Modifiers Consistent Carbohydrate                Evaluation of Received Nutrient/Fluid Intake     Row Name 02/02/23 1400          PO Evaluation    Number of Days PO Intake Evaluated Insufficient Data                   Problem/Interventions:   Problem 1     Row Name 02/02/23 1400          Nutrition Diagnoses Problem 1    Problem 1 Altered Nutrition Related to Labs     Etiology (related to) Medical Diagnosis     Endocrine Hyperglycemia     Signs/Symptoms (evidenced by) Biochemical;Potential Information Deficit     Specific Labs Noted HgbA1C                      Intervention Goal     Row Name 02/02/23 1401          Intervention Goal    General Meet nutritional needs for age/condition;Reduce/improve symptoms     PO Initiate feeding;Establish PO     Weight No significant weight loss                Nutrition Intervention     Row Name 02/02/23 1402          Nutrition Intervention    RD/Tech Action Follow Tx  progress;Care plan reviewd;Encourage intake;Advise alternate selection;Advise available snack;Menu provided;Interview for preference                  Education/Evaluation     Row Name 02/02/23 1402          Education    Education Education topics;Advised regarding habits/behavior;Education offered and refused     Education Topics Basic nutrition;Protein     Advised Regarding Habits/Behavior Food choices;Increased nutrient density        Monitor/Evaluation    Monitor Per protocol;I&O;PO intake;Pertinent labs;Weight;Skin status;Symptoms     Education Follow-up Reinforce PRN                 Electronically signed by:  Katelin Clark RD  02/02/23 14:05 CST

## 2023-02-02 NOTE — TELEPHONE ENCOUNTER
Patient mother called to inform Dr Ellis the patient had been admitted to the ICU for DKA overnight and has now been moved to the 3rd floor. Parent states patiet has been on insulin drip since admission.    Thank you

## 2023-02-02 NOTE — PAYOR COMM NOTE
"[facility]  Case Management  Gelacio Carmichael RN, CM  (P) [phone]  (F) [phone]    Updated clinicals for re-review for IP stay denial.  Reference # [id number]    Anna García (22 y.o. Female)     Date of Birth   2000    Social Security Number       Address   [address] [address]    Home Phone   [phone]    MRN   [id number]       Islam   [facility], [place]    Marital Status                               Admission Date   2/1/23    Admission Type   Emergency    Admitting Provider   Laura Ferrer MD    Attending Provider   Laura Ferrer MD    Department, Room/Bed   [facility] 3 EAST, 374/1       Discharge Date       Discharge Disposition       Discharge Destination                               Attending Provider: Laura Ferrer MD    Allergies: Cefprozil    Isolation: None   Infection: None   Code Status: CPR    Ht: 172.7 cm (68\")   Wt: 81.6 kg (180 lb)    Admission Cmt: None   Principal Problem: DKA, type 1 (HCC) [E10.10]                 Active Insurance as of 2/1/2023     Primary Coverage     Payor Plan Insurance Group Employer/Plan Group    [employer]HE[employer] BLUE CROSS [employer] BLUE CROSS BLUE [employer]IELD PPO [id number]     Payor Plan Address Payor Plan Phone Number Payor Plan Fax Number Effective Dates    [address] [phone]  1/1/2020 - None Entered    [place] [address]       Subscriber Name Subscriber Birth Date Member ID       GABRIEL GARCÍA II [birth year]1997 [id number]           Secondary Coverage     Payor Plan Insurance Group Employer/Plan Group    [employer]HEM BLUE CROSS ANTHEM BLUE CROSS BLUE SHIELD PPO [id number]     Payor Plan Address Payor Plan Phone Number Payor Plan Fax Number Effective Dates    [address] [phone]  1/1/2020 - None Entered    [place] [address]       Subscriber Name Subscriber Birth Date Member ID       JOSE RODRIGUEZ JR. 5/5/1967 [id number]                 Emergency " Contacts      (Rel.) Home Phone Work Phone Mobile Phone    Gilberto García (Spouse) [phone] -- [phone]    Gelacio Garcia (Mother) [phone]               Physician Progress Notes (most recent note)      Laura Ferrer MD at 02/02/23 1118                [facility] Medicine Admission      Date of Admission: 2/1/2023      Primary Care Physician: Maricruz Davis APRN      Chief Complaint: DKA    HPI:    She is a 21 yo that presented with DKA. Gap has closed. Nausea has improved some. Still with electrolyte abnormalities.     Concurrent Medical History:  has a past medical history of Abdominal pain, Acute bronchitis, Acute pharyngitis, Allergic rhinitis, Asteatotic eczema, Carbuncle of buttock, Chalazion, Conjunctivitis, Constipation, Contact dermatitis, Diabetic ketoacidosis (HCC), Diarrhea, DKA (diabetic ketoacidoses), Esotropia, Fatigue, Folliculitis, Hordeolum internum of right lower eyelid, Influenza, Laceration of skin of chin, Nausea and vomiting, Otitis media, Tibial torsion, Type 1 diabetes mellitus (HCC), and Vitamin D deficiency.    Past Surgical History:  has a past surgical history that includes Cryotherapy (10/13/2010) and Other surgical history (05/04/2011).    Family History: family history includes Asthma in her sister; Breast cancer in her maternal grandmother; Heart disease in her maternal grandfather; Hypertension in her mother.     Social History:  reports that she has never smoked. She has never used smokeless tobacco. She reports that she does not drink alcohol and does not use drugs.    Allergies:   Allergies   Allergen Reactions   • Cefprozil Hives       Medications:   Prior to Admission medications    Medication Sig Start Date End Date Taking? Authorizing Provider   Acetone, Urine, Test (TRUEPLUS KETONE TEST STRIPS) Place 1 each on the skin as directed by provider As Needed  (hyperglycemia). 7/15/22  Yes Mariano Fulton MD   albuterol (PROAIR RESPICLICK) 108 (90 Base) MCG/ACT inhaler Inhale 2 puffs Every 4 (Four) Hours As Needed for Wheezing or Shortness of Air. 9/9/20  Yes Jocelin Vergara MD   Alcohol Swabs (Alcohol Wipes) 70 % pads Use 4 x daily 10/28/22  Yes Mariano Fulton MD B-D ULTRA-FINE 33 LANCETS misc Use 4 x daily , any brand covered by insurance 10/28/22  Yes Mariano Fulton MD   Blood Glucose Monitoring Suppl w/Device kit USE AS INDICATED, ANY MONITOR , ICD10 code is E11.9 10/28/22  Yes Mariano Fulton MD   Blood Glucose/Ketone Monitor device Use as indicated 7/15/22  Yes Mariano Fulton MD   Cholecalciferol (Vitamin D3) 1.25 MG (48190 UT) capsule Take 1 capsule by mouth Every 7 (Seven) Days. 7/17/22  Yes Mariano Fulton MD   Glucagon (Baqsimi One Pack) 3 MG/DOSE powder 1 each into the nostril(s) as directed by provider As Needed (Hypoglycemia). Apply intranasal if hypoglycemia 7/15/22  Yes Mariano Fulton MD   Glucose Blood (Blood Glucose Test) strip Use 4 x daily use any brand covered by insurance or same brand as before ICD10 code is E11.9 10/28/22  Yes Mariano Fulton MD   Insulin Glargine, 2 Unit Dial, (Toujeo Max SoloStar) 300 UNIT/ML solution pen-injector injection Inject 50 Units under the skin into the appropriate area as directed Daily. 7/15/22  Yes Mariano Fulton MD   Insulin Lispro-aabc 200 UNIT/ML solution pen-injector Inject 40 Units under the skin into the appropriate area as directed 3 (Three) Times a Day With Meals. 7/15/22  Yes Mariano Fulton MD   Insulin Pen Needle (RICCARDO-MILTON UF III MINI PEN NEEDLES) 31G X 5 MM misc USE 4-6 TIMES PER DAY 7/15/22  Yes Mariano Fulton MD   Ketone Blood Test strip Use as needed w hyperglycemia 7/15/22  Yes Mariano Fulton MD   Lancet Devices (Lancing Device) misc USE AS INDICATED TO CORRELATE WITH STRIPS AND  METER 10/28/22  Yes Mariano Fulton MD   ondansetron (ZOFRAN) 4 MG tablet Take 4 mg by mouth Every 8 (Eight) Hours As Needed. 3/15/21  Yes Karen Gonzalze MD   sertraline (ZOLOFT) 50 MG tablet Take 50 mg by mouth Daily. 3/26/20  Yes Karen Gonzalez MD   Urine Glucose-Ketones Test (Keto-Diastix) strip Test if BG greater than 250 4/22/21  Yes Mariano Fulton MD   Continuous Blood Gluc Sensor (Dexcom G6 Sensor) As Needed (glucose control). Every 10 days,  Use as directed for continuous glucose monitoring 7/15/22   Mariano Fulton MD   fluconazole (DIFLUCAN) 150 MG tablet Take 1 tablet by mouth today and repeat in 4 days if symptoms still present. 8/1/18   Dorcas Roberson APRN   Insulin Regular Human 60x4 &60x8 & 60x12 UNIT powder Inhale 4-32 Units 3 (Three) Times a Day With Meals. 10/28/22   Mariano Fulton MD   Norethin Ace-Eth Estrad-FE (JUNEL FE 24 PO) Take 1 tablet by mouth Daily.    Provider, MD Karen       I have utilized all available immediate resources to obtain, update, or review the patient's current medications (including all prescriptions, over-the-counter products, herbals, cannabis/cannabidiol products, and vitamin/mineral/dietary (nutritional) supplements).     Review of Systems:  Review of Systems   Gastrointestinal: Positive for nausea.      Otherwise complete ROS is negative except as mentioned above.    Physical Exam:   Temp:  [97.9 °F (36.6 °C)-98.7 °F (37.1 °C)] 97.9 °F (36.6 °C)  Heart Rate:  [101-132] 110  Resp:  [18-20] 18  BP: (103-127)/(56-89) 108/66  Physical Exam  Vitals reviewed.   Constitutional:       General: She is not in acute distress.     Appearance: She is well-developed.   HENT:      Head: Normocephalic and atraumatic.      Nose: Nose normal.   Eyes:      Conjunctiva/sclera: Conjunctivae normal.   Cardiovascular:      Rate and Rhythm: Normal rate and regular rhythm.   Pulmonary:      Effort: Pulmonary effort is normal. No  respiratory distress.      Breath sounds: Normal breath sounds. No wheezing or rales.   Musculoskeletal:         General: Normal range of motion.      Cervical back: Normal range of motion and neck supple.   Skin:     General: Skin is warm and dry.   Neurological:      Mental Status: She is alert and oriented to person, place, and time.   Psychiatric:         Behavior: Behavior normal.           Results Reviewed:  I have personally reviewed current lab, radiology, and data and agree with results.  Lab Results (last 24 hours)     Procedure Component Value Units Date/Time    POC Glucose Once [[id number]]  (Abnormal) Collected: 02/02/23 1052    Specimen: Blood Updated: 02/02/23 1114     Glucose 282 mg/dL      Comment: RN NotifiedOperator: [id number] CLEMENCIA ALISHAISSAMeter ID: [id number]       Phosphorus [[id number]]  (Abnormal) Collected: 02/02/23 0754    Specimen: Blood Updated: 02/02/23 1031     Phosphorus 1.3 mg/dL     POC Glucose Once [[id number]]  (Normal) Collected: 02/02/23 0630    Specimen: Blood Updated: 02/02/23 1027     Glucose 127 mg/dL      Comment: : [id number] ALISHA MARSHALLMeter ID: [id number]       POC Glucose Once [[id number]]  (Abnormal) Collected: 02/02/23 0530    Specimen: Blood Updated: 02/02/23 1026     Glucose 163 mg/dL      Comment: : [id number] BETTY NICKMeter ID: [id number]       POC Glucose Once [[id number]]  (Abnormal) Collected: 02/02/23 0420    Specimen: Blood Updated: 02/02/23 1026     Glucose 163 mg/dL      Comment: : [id number] SHEREE MENENDEZIEMeter ID: [id number]       POC Glucose Once [[id number]]  (Abnormal) Collected: 02/02/23 0327    Specimen: Blood Updated: 02/02/23 1026     Glucose 181 mg/dL      Comment: : [id number] BETTY NICKMeter ID: [id number]       POC Glucose Once [[id number]]  (Abnormal) Collected: 02/02/23 0225    Specimen: Blood Updated: 02/02/23 1026     Glucose 195 mg/dL      Comment: : [id number] PABLO Verde  ID: [id number]       POC Glucose Once [[id number]]  (Abnormal) Collected: 02/02/23 0128    Specimen: Blood Updated: 02/02/23 1026     Glucose 204 mg/dL      Comment: : [id number] ALISHA VALLEMeter ID: [id number]       POC Glucose Once [[id number]]  (Abnormal) Collected: 02/02/23 0027    Specimen: Blood Updated: 02/02/23 1026     Glucose 221 mg/dL      Comment: : [id number] BETTY FERREIRAMeter ID: [id number]       POC Glucose Once [[id number]]  (Abnormal) Collected: 02/01/23 2322    Specimen: Blood Updated: 02/02/23 1025     Glucose 208 mg/dL      Comment: : [id number] SHEREE DELACRUZMeter ID: [id number]       POC Glucose Once [[id number]]  (Abnormal) Collected: 02/01/23 2226    Specimen: Blood Updated: 02/02/23 1025     Glucose 217 mg/dL      Comment: : [id number] ALISHA VALLEMeter ID: [id number]       POC Glucose Once [[id number]]  (Abnormal) Collected: 02/01/23 2128    Specimen: Blood Updated: 02/02/23 1025     Glucose 213 mg/dL      Comment: : [id number] JAKE Madden ID: [id number]       POC Glucose Once [[id number]]  (Abnormal) Collected: 02/01/23 2005    Specimen: Blood Updated: 02/02/23 1025     Glucose 266 mg/dL      Comment: Result Not ConfirmedOperator: [id number] SHEREE DELACRUZMeter ID: [id number]       POC Glucose Once [[id number]]  (Normal) Collected: 02/02/23 0847    Specimen: Blood Updated: 02/02/23 1024     Glucose 95 mg/dL      Comment: : [id number] DUSTY ANTHONYMeter ID: [id number]       Extra Tubes [[id number]] Collected: 02/02/23 0754    Specimen: Blood, Venous Line Updated: 02/02/23 0900    Narrative:      The following orders were created for panel order Extra Tubes.  Procedure                               Abnormality         Status                     ---------                               -----------         ------                     Lavender Top[[id number]]                                     Final result                  Please view results for these tests on the individual orders.    Lavender Top [[id number]] Collected: 02/02/23 0754    Specimen: Blood Updated: 02/02/23 0900     Extra Tube hold for add-on     Comment: Auto resulted       Basic Metabolic Panel [[id number]]  (Abnormal) Collected: 02/02/23 0754    Specimen: Blood Updated: 02/02/23 0829     Glucose 116 mg/dL      BUN 8 mg/dL      Creatinine 0.53 mg/dL      Sodium 132 mmol/L      Potassium 3.4 mmol/L      Chloride 106 mmol/L      CO2 19.0 mmol/L      Calcium 8.3 mg/dL      BUN/Creatinine Ratio 15.1     Anion Gap 7.0 mmol/L      eGFR 134.3 mL/min/1.73     Narrative:      GFR Normal >60  Chronic Kidney Disease <60  Kidney Failure <15      Acetone [[id number]]  (Abnormal) Collected: 02/02/23 0754    Specimen: Blood Updated: 02/02/23 0821     Acetone Small    Phosphorus [[id number]]  (Abnormal) Collected: 02/02/23 0413    Specimen: Blood Updated: 02/02/23 0809     Phosphorus 1.5 mg/dL     Magnesium [[id number]]  (Normal) Collected: 02/02/23 0413    Specimen: Blood Updated: 02/02/23 0804     Magnesium 2.0 mg/dL     POC Glucose Once [[id number]]  (Normal) Collected: 02/02/23 0738    Specimen: Blood Updated: 02/02/23 0755     Glucose 105 mg/dL      Comment: : [id number] DUSTY ANTHONYMeter ID: [id number]       Basic Metabolic Panel [[id number]]  (Abnormal) Collected: 02/02/23 0413    Specimen: Blood Updated: 02/02/23 0515     Glucose 193 mg/dL      BUN 9 mg/dL      Creatinine 0.58 mg/dL      Sodium 130 mmol/L      Potassium 3.8 mmol/L      Chloride 103 mmol/L      CO2 15.0 mmol/L      Calcium 8.3 mg/dL      BUN/Creatinine Ratio 15.5     Anion Gap 12.0 mmol/L      eGFR 131.4 mL/min/1.73     Narrative:      GFR Normal >60  Chronic Kidney Disease <60  Kidney Failure <15      CBC & Differential [[id number]]  (Abnormal) Collected: 02/02/23 0413    Specimen: Blood Updated: 02/02/23 0501    Narrative:      The following orders were created for panel order CBC &  Differential.  Procedure                               Abnormality         Status                     ---------                               -----------         ------                     CBC Auto Differential[[id number]]        Abnormal            Final result                 Please view results for these tests on the individual orders.    CBC Auto Differential [[id number]]  (Abnormal) Collected: 02/02/23 0413    Specimen: Blood Updated: 02/02/23 0501     WBC 13.24 10*3/mm3      RBC 4.56 10*6/mm3      Hemoglobin 13.5 g/dL      Hematocrit 40.9 %      MCV 89.7 fL      MCH 29.6 pg      MCHC 33.0 g/dL      RDW 12.0 %      RDW-SD 39.1 fl      MPV 9.3 fL      Platelets 450 10*3/mm3      Neutrophil % 79.9 %      Lymphocyte % 11.2 %      Monocyte % 8.5 %      Eosinophil % 0.0 %      Basophil % 0.2 %      Immature Grans % 0.2 %      Neutrophils, Absolute 10.58 10*3/mm3      Lymphocytes, Absolute 1.48 10*3/mm3      Monocytes, Absolute 1.13 10*3/mm3      Eosinophils, Absolute 0.00 10*3/mm3      Basophils, Absolute 0.02 10*3/mm3      Immature Grans, Absolute 0.03 10*3/mm3      nRBC 0.0 /100 WBC     Basic Metabolic Panel [[id number]]  (Abnormal) Collected: 02/02/23 0001    Specimen: Blood Updated: 02/02/23 0028     Glucose 226 mg/dL      BUN 9 mg/dL      Creatinine 0.81 mg/dL      Sodium 133 mmol/L      Potassium 4.1 mmol/L      Chloride 104 mmol/L      CO2 11.0 mmol/L      Calcium 8.3 mg/dL      BUN/Creatinine Ratio 11.1     Anion Gap 18.0 mmol/L      eGFR 105.4 mL/min/1.73     Narrative:      GFR Normal >60  Chronic Kidney Disease <60  Kidney Failure <15      POC Glucose Once [[id number]]  (Abnormal) Collected: 02/01/23 1822    Specimen: Blood Updated: 02/01/23 1839     Glucose 301 mg/dL      Comment: : [id number] SHEA ESPARZAMeter ID: [id number]       Osmolality, Serum [[id number]]  (Abnormal) Collected: 02/01/23 1711    Specimen: Blood Updated: 02/01/23 1758     Osmolality 304 mOsm/kg     Comprehensive Metabolic  Panel [[id number]]  (Abnormal) Collected: 02/01/23 1711    Specimen: Blood from Arm, Right Updated: 02/01/23 1748     Glucose 364 mg/dL      BUN 12 mg/dL      Creatinine 0.74 mg/dL      Sodium 129 mmol/L      Potassium 5.1 mmol/L      Comment: Slight hemolysis detected by analyzer. Results may be affected.        Chloride 97 mmol/L      CO2 7.0 mmol/L      Calcium 9.0 mg/dL      Total Protein 8.1 g/dL      Albumin 4.2 g/dL      ALT (SGPT) 18 U/L      AST (SGOT) 22 U/L      Comment: Slight hemolysis detected by analyzer. Results may be affected.        Alkaline Phosphatase 109 U/L      Total Bilirubin 0.2 mg/dL      Globulin 3.9 gm/dL      A/G Ratio 1.1 g/dL      BUN/Creatinine Ratio 16.2     Anion Gap 25.0 mmol/L      eGFR 117.5 mL/min/1.73     Narrative:      GFR Normal >60  Chronic Kidney Disease <60  Kidney Failure <15      Phosphorus [[id number]]  (Normal) Collected: 02/01/23 1711    Specimen: Blood from Arm, Right Updated: 02/01/23 1745     Phosphorus 3.9 mg/dL     Magnesium [[id number]]  (Abnormal) Collected: 02/01/23 1711    Specimen: Blood from Arm, Right Updated: 02/01/23 1745     Magnesium 1.5 mg/dL     Ketone Bodies, Serum (Not performed at [facility]) [[id number]]  (Abnormal) Collected: 02/01/23 1711    Specimen: Blood from Arm, Right Updated: 02/01/23 1736    Narrative:      The following orders were created for panel order Ketone Bodies, Serum (Not performed at [facility]).  Procedure                               Abnormality         Status                     ---------                               -----------         ------                     Acetone[[id number]]                      Abnormal            Final result                 Please view results for these tests on the individual orders.    Acetone [[id number]]  (Abnormal) Collected: 02/01/23 1711    Specimen: Blood from Arm, Right Updated: 02/01/23 1736     Acetone Large    Hemoglobin A1c [[id number]]  (Abnormal) Collected: 02/01/23 1711    Specimen:  Blood Updated: 02/01/23 1733     Hemoglobin A1C 11.30 %     Narrative:      Hemoglobin A1C Ranges:    Increased Risk for Diabetes  5.7% to 6.4%  Diabetes                     >= 6.5%  Diabetic Goal                < 7.0%    POC Glucose Once [[id number]]  (Abnormal) Collected: 02/01/23 1710    Specimen: Blood Updated: 02/01/23 1723     Glucose 290 mg/dL      Comment: RN NotifiedOperator: [id number] ZEYAD EVANGELINEMeter ID: [id number]       POC Glucose Once [[id number]]  (Abnormal) Collected: 02/01/23 1539    Specimen: Blood Updated: 02/01/23 1722     Glucose 348 mg/dL      Comment: RN NotifiedNotify DoctorOperator: [id number] ZEYAD EVANGELINEMeter ID: [id number]       CBC & Differential [[id number]]  (Abnormal) Collected: 02/01/23 1626    Specimen: Blood from Arm, Right Updated: 02/01/23 1638    Narrative:      The following orders were created for panel order CBC & Differential.  Procedure                               Abnormality         Status                     ---------                               -----------         ------                     CBC Auto Differential[[id number]]        Abnormal            Final result                 Please view results for these tests on the individual orders.    CBC Auto Differential [[id number]]  (Abnormal) Collected: 02/01/23 1626    Specimen: Blood from Arm, Right Updated: 02/01/23 1638     WBC 15.60 10*3/mm3      RBC 5.20 10*6/mm3      Hemoglobin 15.8 g/dL      Hematocrit 46.9 %      MCV 90.2 fL      MCH 30.4 pg      MCHC 33.7 g/dL      RDW 12.3 %      RDW-SD 40.2 fl      MPV 9.9 fL      Platelets 489 10*3/mm3      Neutrophil % 80.8 %      Lymphocyte % 12.2 %      Monocyte % 5.8 %      Eosinophil % 0.1 %      Basophil % 0.5 %      Immature Grans % 0.6 %      Neutrophils, Absolute 12.60 10*3/mm3      Lymphocytes, Absolute 1.90 10*3/mm3      Monocytes, Absolute 0.90 10*3/mm3      Eosinophils, Absolute 0.02 10*3/mm3      Basophils, Absolute 0.08 10*3/mm3      Immature Grans,  Absolute 0.10 10*3/mm3      nRBC 0.0 /100 WBC         Imaging Results (Last 24 Hours)     Procedure Component Value Units Date/Time    US Guided Vascular Access [[id number]] Collected: 02/01/23 1705     Updated: 02/02/23 0723    Narrative:      PROCEDURE: Ultrasound Guidance Vascular Access      Ordering physician(s): MEE RODRÍGUEZ    Clinical Indication: Venous access      Findings:    The vessel was sonographically evaluated and determined to be  patent. Concurrent realtime ultrasound was used to visualize  needle entry into the right basilic vein and a permanent image  for permanent recording and reporting.         Impression:      Impression: Please see above findings.    Electronically signed by:  Stephen Londono MD  2/2/2023 7:21 AM CST  Workstation: [id number]    IR Insert Midline Without Port Pump 5 Plus [[id number]] Resulted: 02/01/23 1735     Updated: 02/01/23 1735    Narrative:      This procedure was auto-finalized with no dictation required.    XR Chest 1 View [[id number]] Collected: 02/01/23 1609     Updated: 02/01/23 1631    Narrative:        PORTABLE CHEST    HISTORY: Diabetic ketoacidosis    Portable AP upright film of the chest was obtained at 4:01 PM.  COMPARISON: August 5, 2019    FINDINGS:   The lungs are clear of an acute process.  The heart is not enlarged.  The pulmonary vasculature is not increased.  No pleural effusion.  No pneumothorax.  No acute osseous abnormality.      Impression:      CONCLUSION:  Normal portable chest    [id number]    Electronically signed by:  Gucci Vergara MD  2/1/2023 4:28 PM CST  Workstation: [id number]            Assessment and Treatment Plan:     Active Hospital Problems    Diagnosis    • **DKA, type 1 (HCC)    • Diabetic ketoacidosis without coma associated with type 1 diabetes mellitus (HCC)      DKA, now resolved     -insulin drip > change to Levemir   -IVF  -BMP change to daily   -diabetic diet    -A1c 11.30   -home regimen is 46u at night and SSI   -monitor and  replace electrolytes            Medical Decision Making  Number and Complexity of problems: one complex        Conditions and Status:        Condition is unchanged.     Decision rules/scores evaluated (example [id number], Wells, etc): N/A               Care Planning     Code status and discussions: Full Code     I confirmed that the patient's Advance Care Plan is present, code status is documented, or surrogate decision maker is listed in the patient's medical record.     Disposition  Transfer to the floor, home tomorrow if electrolytes have normalized                   This document has been electronically signed by Laura Ferrer MD on February 2, 2023 11:18 CST                  Electronically signed by Laura Ferrer MD at 02/02/23 113
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
## 2023-02-02 NOTE — ED NOTES
"Nursing report ED to floor  Anna García  22 y.o.  female    HPI:   Chief Complaint   Patient presents with    Nausea    Vomiting    Abdominal Pain       Admitting doctor:   Laura Ferrer MD    Consulting provider(s):  Consults       No orders found from 1/3/2023 to 2/2/2023.             Admitting diagnosis:   The primary encounter diagnosis was Diabetic ketoacidosis without coma associated with type 1 diabetes mellitus (HCC). A diagnosis of Hypomagnesemia was also pertinent to this visit.    Code status:   Current Code Status       Date Active Code Status Order ID Comments User Context       2/1/2023 1801 CPR (Attempt to Resuscitate) [id number]  Laura Ferrer MD ED        Question Answer    Code Status (Patient has no pulse and is not breathing) CPR (Attempt to Resuscitate)    Medical Interventions (Patient has pulse or is breathing) Full Support                    Allergies:   Cefprozil    Intake and Output    Intake/Output Summary (Last 24 hours) at 2/1/2023 1828  Last data filed at 2/1/2023 1826  Gross per 24 hour   Intake 1000 ml   Output --   Net 1000 ml       Weight:       02/01/23  1550   Weight: 81.6 kg (180 lb)       Most recent vitals:   Vitals:    02/01/23 1531 02/01/23 1550 02/01/23 1652 02/01/23 1657   BP: 125/89  124/74    BP Location:   Left arm    Patient Position: Sitting  Lying    Pulse: (!) 131  116 116   Resp: 20  20    Temp: 98.2 °F (36.8 °C)      TempSrc: Oral      SpO2: 99%  99% 99%   Weight:  81.6 kg (180 lb)     Height:  172.7 cm (68\")       Oxygen Therapy: room air    Active LDAs/IV Access:   Lines, Drains & Airways       Active LDAs       Name Placement date Placement time Site Days    Midline Catheter - Double Lumen 02/01/23 Right Basilic 02/01/23  1734  -- less than 1    Peripheral IV 02/01/23 1632 Posterior;Right Hand 02/01/23  1632  Hand  less than 1                    Labs (abnormal labs have a star):   Labs Reviewed   COMPREHENSIVE METABOLIC PANEL - Abnormal; " Notable for the following components:       Result Value    Glucose 364 (*)     Sodium 129 (*)     Chloride 97 (*)     CO2 7.0 (*)     Anion Gap 25.0 (*)     All other components within normal limits    Narrative:     GFR Normal >60  Chronic Kidney Disease <60  Kidney Failure <15     MAGNESIUM - Abnormal; Notable for the following components:    Magnesium 1.5 (*)     All other components within normal limits   OSMOLALITY, SERUM - Abnormal; Notable for the following components:    Osmolality 304 (*)     All other components within normal limits   HEMOGLOBIN A1C - Abnormal; Notable for the following components:    Hemoglobin A1C 11.30 (*)     All other components within normal limits    Narrative:     Hemoglobin A1C Ranges:    Increased Risk for Diabetes  5.7% to 6.4%  Diabetes                     >= 6.5%  Diabetic Goal                < 7.0%   CBC WITH AUTO DIFFERENTIAL - Abnormal; Notable for the following components:    WBC 15.60 (*)     Hematocrit 46.9 (*)     Platelets 489 (*)     Neutrophil % 80.8 (*)     Lymphocyte % 12.2 (*)     Eosinophil % 0.1 (*)     Immature Grans % 0.6 (*)     Neutrophils, Absolute 12.60 (*)     Immature Grans, Absolute 0.10 (*)     All other components within normal limits   ACETONE - Abnormal; Notable for the following components:    Acetone Large (*)     All other components within normal limits   POCT GLUCOSE FINGERSTICK - Abnormal; Notable for the following components:    Glucose 348 (*)     All other components within normal limits   POCT GLUCOSE FINGERSTICK - Abnormal; Notable for the following components:    Glucose 290 (*)     All other components within normal limits   PHOSPHORUS - Normal   BLOOD GAS, VENOUS   URINALYSIS W/ MICROSCOPIC IF INDICATED (NO CULTURE)   CBC AND DIFFERENTIAL    Narrative:     The following orders were created for panel order CBC & Differential.  Procedure                               Abnormality         Status                     ---------                                -----------         ------                     CBC Auto Differential[[id number]]        Abnormal            Final result                 Please view results for these tests on the individual orders.   KETONE BODIES SERUM    Narrative:     The following orders were created for panel order Ketone Bodies, Serum (Not performed at [place]).  Procedure                               Abnormality         Status                     ---------                               -----------         ------                     Acetone[[id number]]                      Abnormal            Final result                 Please view results for these tests on the individual orders.       Meds given in ED:   Medications   magnesium sulfate 2g/50 mL (PREMIX) infusion (has no administration in time range)   sodium chloride 0.9 % flush 10 mL (has no administration in time range)   sodium chloride 0.9 % flush 10 mL (has no administration in time range)   sodium chloride 0.9 % infusion 40 mL (has no administration in time range)   Enoxaparin Sodium (LOVENOX) syringe 40 mg (has no administration in time range)   ondansetron (ZOFRAN) injection 4 mg (has no administration in time range)   sodium chloride 0.9 % flush 10 mL (has no administration in time range)   sodium chloride 0.9 % flush 10 mL (has no administration in time range)   sodium chloride 0.9 % infusion 40 mL (has no administration in time range)   dextrose (GLUTOSE) oral gel 15 g (has no administration in time range)   dextrose (D50W) (25 g/50 mL) IV injection 10-50 mL (has no administration in time range)   glucagon (human recombinant) (GLUCAGEN DIAGNOSTIC) injection 1 mg (has no administration in time range)   sodium chloride 0.9 % bolus (has no administration in time range)   sodium chloride 0.9 % infusion (has no administration in time range)   sodium chloride 0.9 % with KCl 20 mEq/L infusion (has no administration in time range)   sodium chloride 0.9 % with KCl 40  mEq/L infusion (has no administration in time range)   dextrose 5 % and sodium chloride 0.9 % infusion (has no administration in time range)   dextrose 5 % and sodium chloride 0.9 % with KCl 20 mEq/L infusion (has no administration in time range)   dextrose 5 % and sodium chloride 0.9 % with KCl 40 mEq/L infusion (has no administration in time range)   sodium chloride 0.45 % infusion (has no administration in time range)   sodium chloride 0.45 % with KCl 20 mEq/L infusion (has no administration in time range)   sodium chloride 0.45 % 1,000 mL with potassium chloride 40 mEq infusion (has no administration in time range)   dextrose 5 % and sodium chloride 0.45 % infusion (has no administration in time range)   dextrose 5 % and sodium chloride 0.45 % with KCl 20 mEq/L infusion (has no administration in time range)   dextrose 5 % and sodium chloride 0.45 % with KCl 40 mEq/L infusion (has no administration in time range)   insulin regular (HumuLIN R,NovoLIN R) 100 Units in sodium chloride 0.9 % 100 mL (1 Units/mL) infusion (3 Units/hr Intravenous New Bag 2/1/23 1825)   ondansetron (ZOFRAN) injection 4 mg (4 mg Intravenous Given 2/1/23 1644)     dextrose 5 % and sodium chloride 0.45 %, 150 mL/hr  dextrose 5 % and sodium chloride 0.45 % with KCl 20 mEq/L, 150 mL/hr  dextrose 5 % and sodium chloride 0.45 % with KCl 40 mEq/L, 150 mL/hr  dextrose 5 % and sodium chloride 0.9 %, 150 mL/hr  dextrose 5 % and sodium chloride 0.9 % with KCl 20 mEq, 150 mL/hr  dextrose 5% and sodium chloride 0.9% with KCl 40 mEq/L, 150 mL/hr  insulin, 0-100 Units/hr, Last Rate: 3 Units/hr (02/01/23 1825)  custom IV KCl infusion builder, 250 mL/hr  sodium chloride, 250 mL/hr  sodium chloride 0.45 % with KCl 20 mEq, 250 mL/hr  sodium chloride, 1,000 mL/hr  sodium chloride, 250 mL/hr  sodium chloride 0.9 % with KCl 20 mEq, 250 mL/hr  sodium chloride 0.9 % with KCl 40 mEq/L, 250 mL/hr         NIH Stroke Scale:       Isolation/Infection(s):  No active  isolations   No active infections     COVID Testing  Collected NA  Resulted NA    Nursing report ED to floor:  Mental status: A&Ox4  Ambulatory status: standby  Precautions: none    ED nurse phone extentsion- [phone]

## 2023-02-02 NOTE — PAYOR COMM NOTE
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 presents with   • Nausea   • Vomiting   • Abdominal Pain       Admitting doctor:   Laura Ferrer MD    Consulting provider(s):  Consults       No orders found from 1/3/2023 to 2/2/2023.             Admitting diagnosis:   The primary encounter diagnosis was Diabetic ketoacidosis without coma associated with type 1 diabetes mellitus (HCC). A diagnosis of Hypomagnesemia was also pertinent to this visit.    Code status:   Current Code Status       Date Active Code Status Order ID Comments User Context       2/1/2023 1801 CPR (Attempt to Resuscitate) [id number]  Laura Ferrer MD ED        Question Answer    Code Status (Patient has no pulse and is not breathing) CPR (Attempt to Resuscitate)    Medical Interventions (Patient has pulse or is breathing) Full Support                    Allergies:  "  Cefprozil    Intake and Output    Intake/Output Summary (Last 24 hours) at 2/1/2023 1828  Last data filed at 2/1/2023 1826  Gross per 24 hour   Intake 1000 ml   Output --   Net 1000 ml       Weight:       02/01/23  1550   Weight: 81.6 kg (180 lb)       Most recent vitals:   Vitals:    02/01/23 1531 02/01/23 1550 02/01/23 1652 02/01/23 1657   BP: 125/89  124/74    BP Location:   Left arm    Patient Position: Sitting  Lying    Pulse: (!) 131  116 116   Resp: 20  20    Temp: 98.2 °F (36.8 °C)      TempSrc: Oral      SpO2: 99%  99% 99%   Weight:  81.6 kg (180 lb)     Height:  172.7 cm (68\")       Oxygen Therapy: room air    Active LDAs/IV Access:   Lines, Drains & Airways       Active LDAs       Name Placement date Placement time Site Days    Midline Catheter - Double Lumen 02/01/23 Right Basilic 02/01/23  1734  -- less than 1    Peripheral IV 02/01/23 1632 Posterior;Right Hand 02/01/23  1632  Hand  less than 1                    Labs (abnormal labs have a star):   Labs Reviewed   COMPREHENSIVE METABOLIC PANEL - Abnormal; Notable for the following components:       Result Value    Glucose 364 (*)     Sodium 129 (*)     Chloride 97 (*)     CO2 7.0 (*)     Anion Gap 25.0 (*)     All other components within normal limits    Narrative:     GFR Normal >60  Chronic Kidney Disease <60  Kidney Failure <15     MAGNESIUM - Abnormal; Notable for the following components:    Magnesium 1.5 (*)     All other components within normal limits   OSMOLALITY, SERUM - Abnormal; Notable for the following components:    Osmolality 304 (*)     All other components within normal limits   HEMOGLOBIN A1C - Abnormal; Notable for the following components:    Hemoglobin A1C 11.30 (*)     All other components within normal limits    Narrative:     Hemoglobin A1C Ranges:    Increased Risk for Diabetes  5.7% to 6.4%  Diabetes                     >= 6.5%  Diabetic Goal                < 7.0%   CBC WITH AUTO DIFFERENTIAL - Abnormal; Notable for the " following components:    WBC 15.60 (*)     Hematocrit 46.9 (*)     Platelets 489 (*)     Neutrophil % 80.8 (*)     Lymphocyte % 12.2 (*)     Eosinophil % 0.1 (*)     Immature Grans % 0.6 (*)     Neutrophils, Absolute 12.60 (*)     Immature Grans, Absolute 0.10 (*)     All other components within normal limits   ACETONE - Abnormal; Notable for the following components:    Acetone Large (*)     All other components within normal limits   POCT GLUCOSE FINGERSTICK - Abnormal; Notable for the following components:    Glucose 348 (*)     All other components within normal limits   POCT GLUCOSE FINGERSTICK - Abnormal; Notable for the following components:    Glucose 290 (*)     All other components within normal limits   PHOSPHORUS - Normal   BLOOD GAS, VENOUS   URINALYSIS W/ MICROSCOPIC IF INDICATED (NO CULTURE)   CBC AND DIFFERENTIAL    Narrative:     The following orders were created for panel order CBC & Differential.  Procedure                               Abnormality         Status                     ---------                               -----------         ------                     CBC Auto Differential[[id number]]        Abnormal            Final result                 Please view results for these tests on the individual orders.   KETONE BODIES SERUM    Narrative:     The following orders were created for panel order Ketone Bodies, Serum (Not performed at [facility]).  Procedure                               Abnormality         Status                     ---------                               -----------         ------                     Acetone[[id number]]                      Abnormal            Final result                 Please view results for these tests on the individual orders.       Meds given in ED:   Medications   magnesium sulfate 2g/50 mL (PREMIX) infusion (has no administration in time range)   sodium chloride 0.9 % flush 10 mL (has no administration in time range)   sodium chloride 0.9 % flush  10 mL (has no administration in time range)   sodium chloride 0.9 % infusion 40 mL (has no administration in time range)   Enoxaparin Sodium (LOVENOX) syringe 40 mg (has no administration in time range)   ondansetron (ZOFRAN) injection 4 mg (has no administration in time range)   sodium chloride 0.9 % flush 10 mL (has no administration in time range)   sodium chloride 0.9 % flush 10 mL (has no administration in time range)   sodium chloride 0.9 % infusion 40 mL (has no administration in time range)   dextrose (GLUTOSE) oral gel 15 g (has no administration in time range)   dextrose (D50W) (25 g/50 mL) IV injection 10-50 mL (has no administration in time range)   glucagon (human recombinant) (GLUCAGEN DIAGNOSTIC) injection 1 mg (has no administration in time range)   sodium chloride 0.9 % bolus (has no administration in time range)   sodium chloride 0.9 % infusion (has no administration in time range)   sodium chloride 0.9 % with KCl 20 mEq/L infusion (has no administration in time range)   sodium chloride 0.9 % with KCl 40 mEq/L infusion (has no administration in time range)   dextrose 5 % and sodium chloride 0.9 % infusion (has no administration in time range)   dextrose 5 % and sodium chloride 0.9 % with KCl 20 mEq/L infusion (has no administration in time range)   dextrose 5 % and sodium chloride 0.9 % with KCl 40 mEq/L infusion (has no administration in time range)   sodium chloride 0.45 % infusion (has no administration in time range)   sodium chloride 0.45 % with KCl 20 mEq/L infusion (has no administration in time range)   sodium chloride 0.45 % 1,000 mL with potassium chloride 40 mEq infusion (has no administration in time range)   dextrose 5 % and sodium chloride 0.45 % infusion (has no administration in time range)   dextrose 5 % and sodium chloride 0.45 % with KCl 20 mEq/L infusion (has no administration in time range)   dextrose 5 % and sodium chloride 0.45 % with KCl 40 mEq/L infusion (has no  administration in time range)   insulin regular (HumuLIN R,NovoLIN R) 100 Units in sodium chloride 0.9 % 100 mL (1 Units/mL) infusion (3 Units/hr Intravenous New Bag 2/1/23 1825)   ondansetron (ZOFRAN) injection 4 mg (4 mg Intravenous Given 2/1/23 1644)     dextrose 5 % and sodium chloride 0.45 %, 150 mL/hr  dextrose 5 % and sodium chloride 0.45 % with KCl 20 mEq/L, 150 mL/hr  dextrose 5 % and sodium chloride 0.45 % with KCl 40 mEq/L, 150 mL/hr  dextrose 5 % and sodium chloride 0.9 %, 150 mL/hr  dextrose 5 % and sodium chloride 0.9 % with KCl 20 mEq, 150 mL/hr  dextrose 5% and sodium chloride 0.9% with KCl 40 mEq/L, 150 mL/hr  insulin, 0-100 Units/hr, Last Rate: 3 Units/hr (02/01/23 1825)  custom IV KCl infusion builder, 250 mL/hr  sodium chloride, 250 mL/hr  sodium chloride 0.45 % with KCl 20 mEq, 250 mL/hr  sodium chloride, 1,000 mL/hr  sodium chloride, 250 mL/hr  sodium chloride 0.9 % with KCl 20 mEq, 250 mL/hr  sodium chloride 0.9 % with KCl 40 mEq/L, 250 mL/hr         NIH Stroke Scale:       Isolation/Infection(s):  No active isolations   No active infections     COVID Testing  Collected NA  Resulted NA    Nursing report ED to floor:  Mental status: A&Ox4  Ambulatory status: standby  Precautions: none    ED nurse phone extentsiet- [phone]    Electronically signed by Lynn Munoz RN at 02/01/23 1829         Lab Results (last 24 hours)     Procedure Component Value Units Date/Time    Extra Tubes [[id number]] Collected: 02/02/23 0754    Specimen: Blood, Venous Line Updated: 02/02/23 0900    Narrative:      The following orders were created for panel order Extra Tubes.  Procedure                               Abnormality         Status                     ---------                               -----------         ------                     Lavender Top[[id number]]                                     Final result                 Please view results for these tests on the individual orders.    Lavender Top [[id number]]  Collected: 02/02/23 0754    Specimen: Blood Updated: 02/02/23 0900     Extra Tube hold for add-on     Comment: Auto resulted       Basic Metabolic Panel [[id number]]  (Abnormal) Collected: 02/02/23 0754    Specimen: Blood Updated: 02/02/23 0829     Glucose 116 mg/dL      BUN 8 mg/dL      Creatinine 0.53 mg/dL      Sodium 132 mmol/L      Potassium 3.4 mmol/L      Chloride 106 mmol/L      CO2 19.0 mmol/L      Calcium 8.3 mg/dL      BUN/Creatinine Ratio 15.1     Anion Gap 7.0 mmol/L      eGFR 134.3 mL/min/1.73     Narrative:      GFR Normal >60  Chronic Kidney Disease <60  Kidney Failure <15      Acetone [[id number]]  (Abnormal) Collected: 02/02/23 0754    Specimen: Blood Updated: 02/02/23 0821     Acetone Small    Phosphorus [[id number]]  (Abnormal) Collected: 02/02/23 0413    Specimen: Blood Updated: 02/02/23 0809     Phosphorus 1.5 mg/dL     Magnesium [[id number]]  (Normal) Collected: 02/02/23 0413    Specimen: Blood Updated: 02/02/23 0804     Magnesium 2.0 mg/dL     POC Glucose Once [[id number]]  (Normal) Collected: 02/02/23 0738    Specimen: Blood Updated: 02/02/23 0755     Glucose 105 mg/dL      Comment: : [id number] DUSTY ANTHONYMeter ID: [id number]       Basic Metabolic Panel [[id number]]  (Abnormal) Collected: 02/02/23 0413    Specimen: Blood Updated: 02/02/23 0515     Glucose 193 mg/dL      BUN 9 mg/dL      Creatinine 0.58 mg/dL      Sodium 130 mmol/L      Potassium 3.8 mmol/L      Chloride 103 mmol/L      CO2 15.0 mmol/L      Calcium 8.3 mg/dL      BUN/Creatinine Ratio 15.5     Anion Gap 12.0 mmol/L      eGFR 131.4 mL/min/1.73     Narrative:      GFR Normal >60  Chronic Kidney Disease <60  Kidney Failure <15      CBC & Differential [[id number]]  (Abnormal) Collected: 02/02/23 0413    Specimen: Blood Updated: 02/02/23 0501    Narrative:      The following orders were created for panel order CBC & Differential.  Procedure                               Abnormality         Status                      ---------                               -----------         ------                     CBC Auto Differential[[id number]]        Abnormal            Final result                 Please view results for these tests on the individual orders.    CBC Auto Differential [[id number]]  (Abnormal) Collected: 02/02/23 0413    Specimen: Blood Updated: 02/02/23 0501     WBC 13.24 10*3/mm3      RBC 4.56 10*6/mm3      Hemoglobin 13.5 g/dL      Hematocrit 40.9 %      MCV 89.7 fL      MCH 29.6 pg      MCHC 33.0 g/dL      RDW 12.0 %      RDW-SD 39.1 fl      MPV 9.3 fL      Platelets 450 10*3/mm3      Neutrophil % 79.9 %      Lymphocyte % 11.2 %      Monocyte % 8.5 %      Eosinophil % 0.0 %      Basophil % 0.2 %      Immature Grans % 0.2 %      Neutrophils, Absolute 10.58 10*3/mm3      Lymphocytes, Absolute 1.48 10*3/mm3      Monocytes, Absolute 1.13 10*3/mm3      Eosinophils, Absolute 0.00 10*3/mm3      Basophils, Absolute 0.02 10*3/mm3      Immature Grans, Absolute 0.03 10*3/mm3      nRBC 0.0 /100 WBC     Basic Metabolic Panel [[id number]]  (Abnormal) Collected: 02/02/23 0001    Specimen: Blood Updated: 02/02/23 0028     Glucose 226 mg/dL      BUN 9 mg/dL      Creatinine 0.81 mg/dL      Sodium 133 mmol/L      Potassium 4.1 mmol/L      Chloride 104 mmol/L      CO2 11.0 mmol/L      Calcium 8.3 mg/dL      BUN/Creatinine Ratio 11.1     Anion Gap 18.0 mmol/L      eGFR 105.4 mL/min/1.73     Narrative:      GFR Normal >60  Chronic Kidney Disease <60  Kidney Failure <15      POC Glucose Once [[id number]]  (Abnormal) Collected: 02/01/23 1822    Specimen: Blood Updated: 02/01/23 1839     Glucose 301 mg/dL      Comment: : [id number] SHEA ESPARZAMeter ID: [id number]       Osmolality, Serum [[id number]]  (Abnormal) Collected: 02/01/23 1711    Specimen: Blood Updated: 02/01/23 1758     Osmolality 304 mOsm/kg     Comprehensive Metabolic Panel [[id number]]  (Abnormal) Collected: 02/01/23 8552    Specimen: Blood from Arm, Right Updated:  02/01/23 1748     Glucose 364 mg/dL      BUN 12 mg/dL      Creatinine 0.74 mg/dL      Sodium 129 mmol/L      Potassium 5.1 mmol/L      Comment: Slight hemolysis detected by analyzer. Results may be affected.        Chloride 97 mmol/L      CO2 7.0 mmol/L      Calcium 9.0 mg/dL      Total Protein 8.1 g/dL      Albumin 4.2 g/dL      ALT (SGPT) 18 U/L      AST (SGOT) 22 U/L      Comment: Slight hemolysis detected by analyzer. Results may be affected.        Alkaline Phosphatase 109 U/L      Total Bilirubin 0.2 mg/dL      Globulin 3.9 gm/dL      A/G Ratio 1.1 g/dL      BUN/Creatinine Ratio 16.2     Anion Gap 25.0 mmol/L      eGFR 117.5 mL/min/1.73     Narrative:      GFR Normal >60  Chronic Kidney Disease <60  Kidney Failure <15      Phosphorus [[id number]]  (Normal) Collected: 02/01/23 1711    Specimen: Blood from Arm, Right Updated: 02/01/23 1745     Phosphorus 3.9 mg/dL     Magnesium [[id number]]  (Abnormal) Collected: 02/01/23 1711    Specimen: Blood from Arm, Right Updated: 02/01/23 1745     Magnesium 1.5 mg/dL     Ketone Bodies, Serum (Not performed at [facility]) [[id number]]  (Abnormal) Collected: 02/01/23 1711    Specimen: Blood from Arm, Right Updated: 02/01/23 1736    Narrative:      The following orders were created for panel order Ketone Bodies, Serum (Not performed at [facility]).  Procedure                               Abnormality         Status                     ---------                               -----------         ------                     Acetone[[id number]]                      Abnormal            Final result                 Please view results for these tests on the individual orders.    Acetone [[id number]]  (Abnormal) Collected: 02/01/23 1711    Specimen: Blood from Arm, Right Updated: 02/01/23 1736     Acetone Large    Hemoglobin A1c [[id number]]  (Abnormal) Collected: 02/01/23 1711    Specimen: Blood Updated: 02/01/23 1733     Hemoglobin A1C 11.30 %     Narrative:      Hemoglobin A1C  Ranges:    Increased Risk for Diabetes  5.7% to 6.4%  Diabetes                     >= 6.5%  Diabetic Goal                < 7.0%    POC Glucose Once [[id number]]  (Abnormal) Collected: 02/01/23 1710    Specimen: Blood Updated: 02/01/23 1723     Glucose 290 mg/dL      Comment: RN NotifiedOperator: [id number] ZEYAD EVANGELINEMeter ID: [id number]       POC Glucose Once [[id number]]  (Abnormal) Collected: 02/01/23 1539    Specimen: Blood Updated: 02/01/23 1722     Glucose 348 mg/dL      Comment: RN NotifiedNotify DoctorOperator: [id number] ZEYAD EVANGELINEMeter ID: [id number]       CBC & Differential [[id number]]  (Abnormal) Collected: 02/01/23 1626    Specimen: Blood from Arm, Right Updated: 02/01/23 1638    Narrative:      The following orders were created for panel order CBC & Differential.  Procedure                               Abnormality         Status                     ---------                               -----------         ------                     CBC Auto Differential[[id number]]        Abnormal            Final result                 Please view results for these tests on the individual orders.    CBC Auto Differential [[id number]]  (Abnormal) Collected: 02/01/23 1626    Specimen: Blood from Arm, Right Updated: 02/01/23 1638     WBC 15.60 10*3/mm3      RBC 5.20 10*6/mm3      Hemoglobin 15.8 g/dL      Hematocrit 46.9 %      MCV 90.2 fL      MCH 30.4 pg      MCHC 33.7 g/dL      RDW 12.3 %      RDW-SD 40.2 fl      MPV 9.9 fL      Platelets 489 10*3/mm3      Neutrophil % 80.8 %      Lymphocyte % 12.2 %      Monocyte % 5.8 %      Eosinophil % 0.1 %      Basophil % 0.5 %      Immature Grans % 0.6 %      Neutrophils, Absolute 12.60 10*3/mm3      Lymphocytes, Absolute 1.90 10*3/mm3      Monocytes, Absolute 0.90 10*3/mm3      Eosinophils, Absolute 0.02 10*3/mm3      Basophils, Absolute 0.08 10*3/mm3      Immature Grans, Absolute 0.10 10*3/mm3      nRBC 0.0 /100 WBC         Imaging Results (Last 24 Hours)      Procedure Component Value Units Date/Time    US Guided Vascular Access [[id number]] Collected: 02/01/23 1705     Updated: 02/02/23 0723    Narrative:      PROCEDURE: Ultrasound Guidance Vascular Access      Ordering physician(s): MEE RODRÍGUEZ    Clinical Indication: Venous access      Findings:    The vessel was sonographically evaluated and determined to be  patent. Concurrent realtime ultrasound was used to visualize  needle entry into the right basilic vein and a permanent image  for permanent recording and reporting.         Impression:      Impression: Please see above findings.    Electronically signed by:  Stephen Londono MD  2/2/2023 7:21 AM CST  Workstation: [id number]    IR Insert Midline Without Port Pump 5 Plus [[id number]] Resulted: 02/01/23 1735     Updated: 02/01/23 1735    Narrative:      This procedure was auto-finalized with no dictation required.    XR Chest 1 View [[id number]] Collected: 02/01/23 1609     Updated: 02/01/23 1631    Narrative:        PORTABLE CHEST    HISTORY: Diabetic ketoacidosis    Portable AP upright film of the chest was obtained at 4:01 PM.  COMPARISON: August 5, 2019    FINDINGS:   The lungs are clear of an acute process.  The heart is not enlarged.  The pulmonary vasculature is not increased.  No pleural effusion.  No pneumothorax.  No acute osseous abnormality.      Impression:      CONCLUSION:  Normal portable chest    [id number]    Electronically signed by:  Gucci Vergara MD  2/1/2023 4:28 PM CST  Workstation: [id number]        ECG/EMG Results (last 24 hours)     Procedure Component Value Units Date/Time    ECG 12 Lead Other; QTc evaluation [[id number]] Collected: 02/01/23 2116     Updated: 02/01/23 2315     QT Interval 368 ms      QTC Interval 559 ms     Narrative:      Test Reason : Other~  Blood Pressure :   */*   mmHG  Vent. Rate : 139 BPM     Atrial Rate : 139 BPM     P-R Int : 128 ms          QRS Dur :  78 ms      QT Int : 368 ms       P-R-T Axes :  63  69  60 degrees     QTc  Int : 559 ms    Sinus tachycardia  Possible Left atrial enlargement  Borderline ECG  When compared with ECG of 12-FEB-2020 11:56,  Vent. rate has increased BY  47 BPM    Referred By:            Confirmed By:           Physician Progress Notes (last 24 hours)  Notes from 02/01/23 0951 through 02/02/23 0951   No notes of this type exist for this encounter.         Medical Student Notes (last 24 hours)  Notes from 02/01/23 0951 through 02/02/23 0951   No notes of this type exist for this encounter.         Consult Notes (last 24 hours)  Notes from 02/01/23 0951 through 02/02/23 0951   No notes of this type exist for this encounter.

## 2023-02-02 NOTE — PAYOR COMM NOTE
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 presents with   • Nausea   • Vomiting   • Abdominal Pain       Admitting doctor:   Laura Ferrer MD    Consulting provider(s):  Consults       No orders found from 1/3/2023 to 2/2/2023.             Admitting diagnosis:   The primary encounter diagnosis was Diabetic ketoacidosis without coma associated with type 1 diabetes mellitus (HCC). A diagnosis of Hypomagnesemia was also pertinent to this visit.    Code status:   Current Code Status       Date Active Code Status Order ID Comments User Context       2/1/2023 1801 CPR (Attempt to Resuscitate) [id number]  Laura Ferrer MD ED        Question Answer    Code Status (Patient has no pulse and is not breathing) CPR (Attempt to Resuscitate)    Medical Interventions (Patient has pulse or is breathing) Full Support                    Allergies:   Cefprozil    Intake and  "Output    Intake/Output Summary (Last 24 hours) at 2/1/2023 1828  Last data filed at 2/1/2023 1826  Gross per 24 hour   Intake 1000 ml   Output --   Net 1000 ml       Weight:       02/01/23  1550   Weight: 81.6 kg (180 lb)       Most recent vitals:   Vitals:    02/01/23 1531 02/01/23 1550 02/01/23 1652 02/01/23 1657   BP: 125/89  124/74    BP Location:   Left arm    Patient Position: Sitting  Lying    Pulse: (!) 131  116 116   Resp: 20  20    Temp: 98.2 °F (36.8 °C)      TempSrc: Oral      SpO2: 99%  99% 99%   Weight:  81.6 kg (180 lb)     Height:  172.7 cm (68\")       Oxygen Therapy: room air    Active LDAs/IV Access:   Lines, Drains & Airways       Active LDAs       Name Placement date Placement time Site Days    Midline Catheter - Double Lumen 02/01/23 Right Basilic 02/01/23  1734  -- less than 1    Peripheral IV 02/01/23 1632 Posterior;Right Hand 02/01/23  1632  Hand  less than 1                    Labs (abnormal labs have a star):   Labs Reviewed   COMPREHENSIVE METABOLIC PANEL - Abnormal; Notable for the following components:       Result Value    Glucose 364 (*)     Sodium 129 (*)     Chloride 97 (*)     CO2 7.0 (*)     Anion Gap 25.0 (*)     All other components within normal limits    Narrative:     GFR Normal >60  Chronic Kidney Disease <60  Kidney Failure <15     MAGNESIUM - Abnormal; Notable for the following components:    Magnesium 1.5 (*)     All other components within normal limits   OSMOLALITY, SERUM - Abnormal; Notable for the following components:    Osmolality 304 (*)     All other components within normal limits   HEMOGLOBIN A1C - Abnormal; Notable for the following components:    Hemoglobin A1C 11.30 (*)     All other components within normal limits    Narrative:     Hemoglobin A1C Ranges:    Increased Risk for Diabetes  5.7% to 6.4%  Diabetes                     >= 6.5%  Diabetic Goal                < 7.0%   CBC WITH AUTO DIFFERENTIAL - Abnormal; Notable for the following components:    WBC " 15.60 (*)     Hematocrit 46.9 (*)     Platelets 489 (*)     Neutrophil % 80.8 (*)     Lymphocyte % 12.2 (*)     Eosinophil % 0.1 (*)     Immature Grans % 0.6 (*)     Neutrophils, Absolute 12.60 (*)     Immature Grans, Absolute 0.10 (*)     All other components within normal limits   ACETONE - Abnormal; Notable for the following components:    Acetone Large (*)     All other components within normal limits   POCT GLUCOSE FINGERSTICK - Abnormal; Notable for the following components:    Glucose 348 (*)     All other components within normal limits   POCT GLUCOSE FINGERSTICK - Abnormal; Notable for the following components:    Glucose 290 (*)     All other components within normal limits   PHOSPHORUS - Normal   BLOOD GAS, VENOUS   URINALYSIS W/ MICROSCOPIC IF INDICATED (NO CULTURE)   CBC AND DIFFERENTIAL    Narrative:     The following orders were created for panel order CBC & Differential.  Procedure                               Abnormality         Status                     ---------                               -----------         ------                     CBC Auto Differential[[id number]]        Abnormal            Final result                 Please view results for these tests on the individual orders.   KETONE BODIES SERUM    Narrative:     The following orders were created for panel order Ketone Bodies, Serum (Not performed at [facility]).  Procedure                               Abnormality         Status                     ---------                               -----------         ------                     Acetone[[id number]]                      Abnormal            Final result                 Please view results for these tests on the individual orders.       Meds given in ED:   Medications   magnesium sulfate 2g/50 mL (PREMIX) infusion (has no administration in time range)   sodium chloride 0.9 % flush 10 mL (has no administration in time range)   sodium chloride 0.9 % flush 10 mL (has no administration  in time range)   sodium chloride 0.9 % infusion 40 mL (has no administration in time range)   Enoxaparin Sodium (LOVENOX) syringe 40 mg (has no administration in time range)   ondansetron (ZOFRAN) injection 4 mg (has no administration in time range)   sodium chloride 0.9 % flush 10 mL (has no administration in time range)   sodium chloride 0.9 % flush 10 mL (has no administration in time range)   sodium chloride 0.9 % infusion 40 mL (has no administration in time range)   dextrose (GLUTOSE) oral gel 15 g (has no administration in time range)   dextrose (D50W) (25 g/50 mL) IV injection 10-50 mL (has no administration in time range)   glucagon (human recombinant) (GLUCAGEN DIAGNOSTIC) injection 1 mg (has no administration in time range)   sodium chloride 0.9 % bolus (has no administration in time range)   sodium chloride 0.9 % infusion (has no administration in time range)   sodium chloride 0.9 % with KCl 20 mEq/L infusion (has no administration in time range)   sodium chloride 0.9 % with KCl 40 mEq/L infusion (has no administration in time range)   dextrose 5 % and sodium chloride 0.9 % infusion (has no administration in time range)   dextrose 5 % and sodium chloride 0.9 % with KCl 20 mEq/L infusion (has no administration in time range)   dextrose 5 % and sodium chloride 0.9 % with KCl 40 mEq/L infusion (has no administration in time range)   sodium chloride 0.45 % infusion (has no administration in time range)   sodium chloride 0.45 % with KCl 20 mEq/L infusion (has no administration in time range)   sodium chloride 0.45 % 1,000 mL with potassium chloride 40 mEq infusion (has no administration in time range)   dextrose 5 % and sodium chloride 0.45 % infusion (has no administration in time range)   dextrose 5 % and sodium chloride 0.45 % with KCl 20 mEq/L infusion (has no administration in time range)   dextrose 5 % and sodium chloride 0.45 % with KCl 40 mEq/L infusion (has no administration in time range)   insulin  regular (HumuLIN R,NovoLIN R) 100 Units in sodium chloride 0.9 % 100 mL (1 Units/mL) infusion (3 Units/hr Intravenous New Bag 2/1/23 1825)   ondansetron (ZOFRAN) injection 4 mg (4 mg Intravenous Given 2/1/23 1644)     dextrose 5 % and sodium chloride 0.45 %, 150 mL/hr  dextrose 5 % and sodium chloride 0.45 % with KCl 20 mEq/L, 150 mL/hr  dextrose 5 % and sodium chloride 0.45 % with KCl 40 mEq/L, 150 mL/hr  dextrose 5 % and sodium chloride 0.9 %, 150 mL/hr  dextrose 5 % and sodium chloride 0.9 % with KCl 20 mEq, 150 mL/hr  dextrose 5% and sodium chloride 0.9% with KCl 40 mEq/L, 150 mL/hr  insulin, 0-100 Units/hr, Last Rate: 3 Units/hr (02/01/23 1825)  custom IV KCl infusion builder, 250 mL/hr  sodium chloride, 250 mL/hr  sodium chloride 0.45 % with KCl 20 mEq, 250 mL/hr  sodium chloride, 1,000 mL/hr  sodium chloride, 250 mL/hr  sodium chloride 0.9 % with KCl 20 mEq, 250 mL/hr  sodium chloride 0.9 % with KCl 40 mEq/L, 250 mL/hr         NIH Stroke Scale:       Isolation/Infection(s):  No active isolations   No active infections     COVID Testing  Collected NA  Resulted NA    Nursing report ED to floor:  Mental status: A&Ox4  Ambulatory status: standby  Precautions: none    ED nurse phone extentsiuu- [phone]    Electronically signed by Lynn Munoz RN at 02/01/23 1829         Lab Results (last 24 hours)     Procedure Component Value Units Date/Time    Extra Tubes [[id number]] Collected: 02/02/23 0754    Specimen: Blood, Venous Line Updated: 02/02/23 0900    Narrative:      The following orders were created for panel order Extra Tubes.  Procedure                               Abnormality         Status                     ---------                               -----------         ------                     Lavender Top[[id number]]                                     Final result                 Please view results for these tests on the individual orders.    Lavender Top [[id number]] Collected: 02/02/23 0754    Specimen:  Blood Updated: 02/02/23 0900     Extra Tube hold for add-on     Comment: Auto resulted       Basic Metabolic Panel [[id number]]  (Abnormal) Collected: 02/02/23 0754    Specimen: Blood Updated: 02/02/23 0829     Glucose 116 mg/dL      BUN 8 mg/dL      Creatinine 0.53 mg/dL      Sodium 132 mmol/L      Potassium 3.4 mmol/L      Chloride 106 mmol/L      CO2 19.0 mmol/L      Calcium 8.3 mg/dL      BUN/Creatinine Ratio 15.1     Anion Gap 7.0 mmol/L      eGFR 134.3 mL/min/1.73     Narrative:      GFR Normal >60  Chronic Kidney Disease <60  Kidney Failure <15      Acetone [[id number]]  (Abnormal) Collected: 02/02/23 0754    Specimen: Blood Updated: 02/02/23 0821     Acetone Small    Phosphorus [[id number]]  (Abnormal) Collected: 02/02/23 0413    Specimen: Blood Updated: 02/02/23 0809     Phosphorus 1.5 mg/dL     Magnesium [[id number]]  (Normal) Collected: 02/02/23 0413    Specimen: Blood Updated: 02/02/23 0804     Magnesium 2.0 mg/dL     POC Glucose Once [[id number]]  (Normal) Collected: 02/02/23 0738    Specimen: Blood Updated: 02/02/23 0755     Glucose 105 mg/dL      Comment: : [id number] DUSTY ANTHONYMeter ID: [id number]       Basic Metabolic Panel [[id number]]  (Abnormal) Collected: 02/02/23 0413    Specimen: Blood Updated: 02/02/23 0515     Glucose 193 mg/dL      BUN 9 mg/dL      Creatinine 0.58 mg/dL      Sodium 130 mmol/L      Potassium 3.8 mmol/L      Chloride 103 mmol/L      CO2 15.0 mmol/L      Calcium 8.3 mg/dL      BUN/Creatinine Ratio 15.5     Anion Gap 12.0 mmol/L      eGFR 131.4 mL/min/1.73     Narrative:      GFR Normal >60  Chronic Kidney Disease <60  Kidney Failure <15      CBC & Differential [[id number]]  (Abnormal) Collected: 02/02/23 0413    Specimen: Blood Updated: 02/02/23 0501    Narrative:      The following orders were created for panel order CBC & Differential.  Procedure                               Abnormality         Status                     ---------                                -----------         ------                     CBC Auto Differential[[id number]]        Abnormal            Final result                 Please view results for these tests on the individual orders.    CBC Auto Differential [[id number]]  (Abnormal) Collected: 02/02/23 0413    Specimen: Blood Updated: 02/02/23 0501     WBC 13.24 10*3/mm3      RBC 4.56 10*6/mm3      Hemoglobin 13.5 g/dL      Hematocrit 40.9 %      MCV 89.7 fL      MCH 29.6 pg      MCHC 33.0 g/dL      RDW 12.0 %      RDW-SD 39.1 fl      MPV 9.3 fL      Platelets 450 10*3/mm3      Neutrophil % 79.9 %      Lymphocyte % 11.2 %      Monocyte % 8.5 %      Eosinophil % 0.0 %      Basophil % 0.2 %      Immature Grans % 0.2 %      Neutrophils, Absolute 10.58 10*3/mm3      Lymphocytes, Absolute 1.48 10*3/mm3      Monocytes, Absolute 1.13 10*3/mm3      Eosinophils, Absolute 0.00 10*3/mm3      Basophils, Absolute 0.02 10*3/mm3      Immature Grans, Absolute 0.03 10*3/mm3      nRBC 0.0 /100 WBC     Basic Metabolic Panel [[id number]]  (Abnormal) Collected: 02/02/23 0001    Specimen: Blood Updated: 02/02/23 0028     Glucose 226 mg/dL      BUN 9 mg/dL      Creatinine 0.81 mg/dL      Sodium 133 mmol/L      Potassium 4.1 mmol/L      Chloride 104 mmol/L      CO2 11.0 mmol/L      Calcium 8.3 mg/dL      BUN/Creatinine Ratio 11.1     Anion Gap 18.0 mmol/L      eGFR 105.4 mL/min/1.73     Narrative:      GFR Normal >60  Chronic Kidney Disease <60  Kidney Failure <15      POC Glucose Once [[id number]]  (Abnormal) Collected: 02/01/23 1822    Specimen: Blood Updated: 02/01/23 1839     Glucose 301 mg/dL      Comment: : [id number] SHEA HOUCARLOSMeter ID: [id number]       Osmolality, Serum [[id number]]  (Abnormal) Collected: 02/01/23 1711    Specimen: Blood Updated: 02/01/23 1758     Osmolality 304 mOsm/kg     Comprehensive Metabolic Panel [[id number]]  (Abnormal) Collected: 02/01/23 1711    Specimen: Blood from Arm, Right Updated: 02/01/23 1748     Glucose 364 mg/dL      BUN  12 mg/dL      Creatinine 0.74 mg/dL      Sodium 129 mmol/L      Potassium 5.1 mmol/L      Comment: Slight hemolysis detected by analyzer. Results may be affected.        Chloride 97 mmol/L      CO2 7.0 mmol/L      Calcium 9.0 mg/dL      Total Protein 8.1 g/dL      Albumin 4.2 g/dL      ALT (SGPT) 18 U/L      AST (SGOT) 22 U/L      Comment: Slight hemolysis detected by analyzer. Results may be affected.        Alkaline Phosphatase 109 U/L      Total Bilirubin 0.2 mg/dL      Globulin 3.9 gm/dL      A/G Ratio 1.1 g/dL      BUN/Creatinine Ratio 16.2     Anion Gap 25.0 mmol/L      eGFR 117.5 mL/min/1.73     Narrative:      GFR Normal >60  Chronic Kidney Disease <60  Kidney Failure <15      Phosphorus [[id number]]  (Normal) Collected: 02/01/23 1711    Specimen: Blood from Arm, Right Updated: 02/01/23 1745     Phosphorus 3.9 mg/dL     Magnesium [[id number]]  (Abnormal) Collected: 02/01/23 1711    Specimen: Blood from Arm, Right Updated: 02/01/23 1745     Magnesium 1.5 mg/dL     Ketone Bodies, Serum (Not performed at [facility]) [[id number]]  (Abnormal) Collected: 02/01/23 1711    Specimen: Blood from Arm, Right Updated: 02/01/23 1736    Narrative:      The following orders were created for panel order Ketone Bodies, Serum (Not performed at [facility]).  Procedure                               Abnormality         Status                     ---------                               -----------         ------                     Acetone[[id number]]                      Abnormal            Final result                 Please view results for these tests on the individual orders.    Acetone [[id number]]  (Abnormal) Collected: 02/01/23 1711    Specimen: Blood from Arm, Right Updated: 02/01/23 1736     Acetone Large    Hemoglobin A1c [[id number]]  (Abnormal) Collected: 02/01/23 1711    Specimen: Blood Updated: 02/01/23 1733     Hemoglobin A1C 11.30 %     Narrative:      Hemoglobin A1C Ranges:    Increased Risk for Diabetes  5.7% to  6.4%  Diabetes                     >= 6.5%  Diabetic Goal                < 7.0%    POC Glucose Once [[id number]]  (Abnormal) Collected: 02/01/23 1710    Specimen: Blood Updated: 02/01/23 1723     Glucose 290 mg/dL      Comment: RN NotifiedOperator: [id number] ZEYAD EVANGELINEMeter ID: [id number]       POC Glucose Once [[id number]]  (Abnormal) Collected: 02/01/23 1539    Specimen: Blood Updated: 02/01/23 1722     Glucose 348 mg/dL      Comment: RN NotifiedNotify DoctorOperator: [id number] ZEYAD EVANGELINEMeter ID: [id number]       CBC & Differential [[id number]]  (Abnormal) Collected: 02/01/23 1626    Specimen: Blood from Arm, Right Updated: 02/01/23 1638    Narrative:      The following orders were created for panel order CBC & Differential.  Procedure                               Abnormality         Status                     ---------                               -----------         ------                     CBC Auto Differential[[id number]]        Abnormal            Final result                 Please view results for these tests on the individual orders.    CBC Auto Differential [[id number]]  (Abnormal) Collected: 02/01/23 1626    Specimen: Blood from Arm, Right Updated: 02/01/23 1638     WBC 15.60 10*3/mm3      RBC 5.20 10*6/mm3      Hemoglobin 15.8 g/dL      Hematocrit 46.9 %      MCV 90.2 fL      MCH 30.4 pg      MCHC 33.7 g/dL      RDW 12.3 %      RDW-SD 40.2 fl      MPV 9.9 fL      Platelets 489 10*3/mm3      Neutrophil % 80.8 %      Lymphocyte % 12.2 %      Monocyte % 5.8 %      Eosinophil % 0.1 %      Basophil % 0.5 %      Immature Grans % 0.6 %      Neutrophils, Absolute 12.60 10*3/mm3      Lymphocytes, Absolute 1.90 10*3/mm3      Monocytes, Absolute 0.90 10*3/mm3      Eosinophils, Absolute 0.02 10*3/mm3      Basophils, Absolute 0.08 10*3/mm3      Immature Grans, Absolute 0.10 10*3/mm3      nRBC 0.0 /100 WBC         Imaging Results (Last 24 Hours)     Procedure Component Value Units Date/Time    US  Guided Vascular Access [[id number]] Collected: 02/01/23 1705     Updated: 02/02/23 0723    Narrative:      PROCEDURE: Ultrasound Guidance Vascular Access      Ordering physician(s): MEE RODRÍGUEZ    Clinical Indication: Venous access      Findings:    The vessel was sonographically evaluated and determined to be  patent. Concurrent realtime ultrasound was used to visualize  needle entry into the right basilic vein and a permanent image  for permanent recording and reporting.         Impression:      Impression: Please see above findings.    Electronically signed by:  Stephen Londono MD  2/2/2023 7:21 AM CST  Workstation: [id number]    IR Insert Midline Without Port Pump 5 Plus [[id number]] Resulted: 02/01/23 1735     Updated: 02/01/23 1735    Narrative:      This procedure was auto-finalized with no dictation required.    XR Chest 1 View [[id number]] Collected: 02/01/23 1609     Updated: 02/01/23 1631    Narrative:        PORTABLE CHEST    HISTORY: Diabetic ketoacidosis    Portable AP upright film of the chest was obtained at 4:01 PM.  COMPARISON: August 5, 2019    FINDINGS:   The lungs are clear of an acute process.  The heart is not enlarged.  The pulmonary vasculature is not increased.  No pleural effusion.  No pneumothorax.  No acute osseous abnormality.      Impression:      CONCLUSION:  Normal portable chest    [id number]    Electronically signed by:  Gucci Vergara MD  2/1/2023 4:28 PM CST  Workstation: [id number]        ECG/EMG Results (last 24 hours)     Procedure Component Value Units Date/Time    ECG 12 Lead Other; QTc evaluation [[id number]] Collected: 02/01/23 2116     Updated: 02/01/23 2315     QT Interval 368 ms      QTC Interval 559 ms     Narrative:      Test Reason : Other~  Blood Pressure :   */*   mmHG  Vent. Rate : 139 BPM     Atrial Rate : 139 BPM     P-R Int : 128 ms          QRS Dur :  78 ms      QT Int : 368 ms       P-R-T Axes :  63  69  60 degrees     QTc Int : 559 ms    Sinus tachycardia  Possible  Left atrial enlargement  Borderline ECG  When compared with ECG of 12-FEB-2020 11:56,  Vent. rate has increased BY  47 BPM    Referred By:            Confirmed By:           Physician Progress Notes (last 24 hours)  Notes from 02/01/23 1006 through 02/02/23 1006   No notes of this type exist for this encounter.         Medical Student Notes (last 24 hours)  Notes from 02/01/23 1006 through 02/02/23 1006   No notes of this type exist for this encounter.         Consult Notes (last 24 hours)  Notes from 02/01/23 1006 through 02/02/23 1006   No notes of this type exist for this encounter.

## 2023-02-02 NOTE — PLAN OF CARE
Goal Outcome Evaluation:  Plan of Care Reviewed With: patient goals to get insulin drip turned off        Progress: improving

## 2023-02-03 LAB
ANION GAP SERPL CALCULATED.3IONS-SCNC: 8 MMOL/L (ref 5–15)
BUN SERPL-MCNC: 6 MG/DL (ref 6–20)
BUN/CREAT SERPL: 15.4 (ref 7–25)
CALCIUM SPEC-SCNC: 8.6 MG/DL (ref 8.6–10.5)
CHLORIDE SERPL-SCNC: 109 MMOL/L (ref 98–107)
CO2 SERPL-SCNC: 24 MMOL/L (ref 22–29)
CREAT SERPL-MCNC: 0.39 MG/DL (ref 0.57–1)
EGFRCR SERPLBLD CKD-EPI 2021: 144.6 ML/MIN/1.73
GLUCOSE BLDC GLUCOMTR-MCNC: 110 MG/DL (ref 70–130)
GLUCOSE BLDC GLUCOMTR-MCNC: 127 MG/DL (ref 70–130)
GLUCOSE BLDC GLUCOMTR-MCNC: 140 MG/DL (ref 70–130)
GLUCOSE BLDC GLUCOMTR-MCNC: 174 MG/DL (ref 70–130)
GLUCOSE BLDC GLUCOMTR-MCNC: 228 MG/DL (ref 70–130)
GLUCOSE BLDC GLUCOMTR-MCNC: 257 MG/DL (ref 70–130)
GLUCOSE BLDC GLUCOMTR-MCNC: 291 MG/DL (ref 70–130)
GLUCOSE BLDC GLUCOMTR-MCNC: 99 MG/DL (ref 70–130)
GLUCOSE SERPL-MCNC: 139 MG/DL (ref 65–99)
PHOSPHATE SERPL-MCNC: 2.9 MG/DL (ref 2.5–4.5)
POTASSIUM SERPL-SCNC: 3.5 MMOL/L (ref 3.5–5.2)
SODIUM SERPL-SCNC: 141 MMOL/L (ref 136–145)
WHOLE BLOOD HOLD SPECIMEN: NORMAL

## 2023-02-03 PROCEDURE — 25010000002 ENOXAPARIN PER 10 MG: Performed by: STUDENT IN AN ORGANIZED HEALTH CARE EDUCATION/TRAINING PROGRAM

## 2023-02-03 PROCEDURE — 80048 BASIC METABOLIC PNL TOTAL CA: CPT | Performed by: STUDENT IN AN ORGANIZED HEALTH CARE EDUCATION/TRAINING PROGRAM

## 2023-02-03 PROCEDURE — 82962 GLUCOSE BLOOD TEST: CPT

## 2023-02-03 PROCEDURE — 63710000001 INSULIN ASPART PER 5 UNITS: Performed by: HOSPITALIST

## 2023-02-03 PROCEDURE — 63710000001 INSULIN DETEMIR PER 5 UNITS: Performed by: STUDENT IN AN ORGANIZED HEALTH CARE EDUCATION/TRAINING PROGRAM

## 2023-02-03 PROCEDURE — 84100 ASSAY OF PHOSPHORUS: CPT | Performed by: STUDENT IN AN ORGANIZED HEALTH CARE EDUCATION/TRAINING PROGRAM

## 2023-02-03 PROCEDURE — 63710000001 INSULIN ASPART PER 5 UNITS: Performed by: INTERNAL MEDICINE

## 2023-02-03 PROCEDURE — 25010000002 ONDANSETRON PER 1 MG: Performed by: HOSPITALIST

## 2023-02-03 RX ORDER — SCOLOPAMINE TRANSDERMAL SYSTEM 1 MG/1
1 PATCH, EXTENDED RELEASE TRANSDERMAL
Status: DISCONTINUED | OUTPATIENT
Start: 2023-02-03 | End: 2023-02-04 | Stop reason: HOSPADM

## 2023-02-03 RX ORDER — SCOLOPAMINE TRANSDERMAL SYSTEM 1 MG/1
1 PATCH, EXTENDED RELEASE TRANSDERMAL
Qty: 11.33 PATCH | Refills: 0 | Status: SHIPPED | OUTPATIENT
Start: 2023-02-03

## 2023-02-03 RX ORDER — INSULIN ASPART 100 [IU]/ML
7 INJECTION, SOLUTION INTRAVENOUS; SUBCUTANEOUS
Status: DISCONTINUED | OUTPATIENT
Start: 2023-02-03 | End: 2023-02-04 | Stop reason: HOSPADM

## 2023-02-03 RX ORDER — ONDANSETRON 2 MG/ML
4 INJECTION INTRAMUSCULAR; INTRAVENOUS EVERY 6 HOURS PRN
Status: DISCONTINUED | OUTPATIENT
Start: 2023-02-03 | End: 2023-02-04 | Stop reason: HOSPADM

## 2023-02-03 RX ORDER — ONDANSETRON 4 MG/1
4 TABLET, ORALLY DISINTEGRATING ORAL EVERY 8 HOURS PRN
Qty: 20 TABLET | Refills: 0 | Status: SHIPPED | OUTPATIENT
Start: 2023-02-03

## 2023-02-03 RX ADMIN — Medication 10 ML: at 20:56

## 2023-02-03 RX ADMIN — Medication 10 ML: at 10:42

## 2023-02-03 RX ADMIN — INSULIN DETEMIR 26 UNITS: 100 INJECTION, SOLUTION SUBCUTANEOUS at 20:55

## 2023-02-03 RX ADMIN — SCOLOPAMINE TRANSDERMAL SYSTEM 1 PATCH: 1 PATCH, EXTENDED RELEASE TRANSDERMAL at 09:27

## 2023-02-03 RX ADMIN — INSULIN ASPART 7 UNITS: 100 INJECTION, SOLUTION INTRAVENOUS; SUBCUTANEOUS at 13:38

## 2023-02-03 RX ADMIN — ENOXAPARIN SODIUM 40 MG: 40 INJECTION SUBCUTANEOUS at 13:39

## 2023-02-03 RX ADMIN — INSULIN DETEMIR 20 UNITS: 100 INJECTION, SOLUTION SUBCUTANEOUS at 09:02

## 2023-02-03 RX ADMIN — INSULIN ASPART 7 UNITS: 100 INJECTION, SOLUTION INTRAVENOUS; SUBCUTANEOUS at 18:09

## 2023-02-03 RX ADMIN — ONDANSETRON 4 MG: 2 INJECTION INTRAMUSCULAR; INTRAVENOUS at 09:27

## 2023-02-03 RX ADMIN — Medication 10 ML: at 20:55

## 2023-02-03 NOTE — PLAN OF CARE
Goal Outcome Evaluation:  Plan of Care Reviewed With: patient        Progress: improving  Outcome Evaluation: pt is concerned about only being on sliding scale and not covering carbs with insulin. Doctor order one time dose for correction at 1130. Pt now concerned with gap number and ketone level. Will continue to monitor.

## 2023-02-03 NOTE — PROGRESS NOTES
[facility] Medicine   INPATIENT PROGRESS NOTE      Patient Name: Anna García  Date of Admission: 2/1/2023  Today's Date: 02/03/23  Length of Stay: 2  Primary Care Physician: Maricruz Davis APRN    Subjective   Chief Complaint:  HPI   Overnight copurse reviewed, she is concerned that her sugars jumped up after she came out of the ICU. Explained that there is some rebound as people transition from insulin drip to intermittent regimen with diabetic diet [ 282(1052 hrs on 2/2) -> 318 (1440) -> 296 (1606) -> 291 (1931) -> 349 (2300) -> 257 (0119 on 2/3) -> 140 (0346) -> 139 (0524) -> 127 (0614) -> 99 (1005 hrs)], ignoring the long dano ángela that was consumed last night. She is feeling nauseated this morning with flushed cheeks.   Review of Systems   All pertinent negatives and positives are as above. All other systems have been reviewed and are negative unless otherwise stated.     Objective    Temp:  [97.9 °F (36.6 °C)-98.7 °F (37.1 °C)] 98 °F (36.7 °C)  Heart Rate:  [] 85  Resp:  [16-20] 20  BP: (105-114)/(66-77) 105/69  Physical Exam  Laying in bed on the right side with her SO in bed. NAD  PERRL, EOMI  Mucosae moist  Breathing stable  Heart rate controlled. Normotensive  Upper abdomen bloating. ND  Moving all limbs  Rest as before  Results Review:  I have reviewed the labs, radiology results, and diagnostic studies.    Laboratory Data:   Results from last 7 days   Lab Units 02/02/23  0413 02/01/23  1626   WBC 10*3/mm3 13.24* 15.60*   HEMOGLOBIN g/dL 13.5 15.8   HEMATOCRIT % 40.9 46.9*   PLATELETS 10*3/mm3 450 489*        Results from last 7 days   Lab Units 02/03/23  0524 02/02/23  1501 02/02/23  0754 02/02/23  0413 02/02/23  0001 02/01/23  1711   SODIUM mmol/L 141  --  132* 130*   < > 129*   POTASSIUM mmol/L 3.5 4.0 3.4* 3.8   < > 5.1   CHLORIDE mmol/L 109*  --  106 103   < > 97*   CO2 mmol/L 24.0  --  19.0* 15.0*   < > 7.0*   BUN mg/dL 6  --  8  9   < > 12   CREATININE mg/dL 0.39*  --  0.53* 0.58   < > 0.74   CALCIUM mg/dL 8.6  --  8.3* 8.3*   < > 9.0   BILIRUBIN mg/dL  --   --   --   --   --  0.2   ALK PHOS U/L  --   --   --   --   --  109   ALT (SGPT) U/L  --   --   --   --   --  18   AST (SGOT) U/L  --   --   --   --   --  22   GLUCOSE mg/dL 139*  --  116* 193*   < > 364*    < > = values in this interval not displayed.       Culture Data:   No results found for: BLOODCX  No results found for: URINECX  No results found for: RESPCX  No results found for: WOUNDCX  No results found for: STOOLCX  No components found for: BODYFLD    Radiology Data:   Imaging Results (Last 24 Hours)     ** No results found for the last 24 hours. **        I have reviewed the patient's current medications.     Assessment/Plan     Active Hospital Problems    Diagnosis    • nausea Added V zofran with scopolamine patch and will try to keep her on PO dissolvable zofran at home   • Diabetic ketoacidosis without coma associated with type 1 diabetes mellitus (HCC) Continue basal BID levemir, and correctional scale, add prandial insulin while here with diabetic diet.    Initially planning to send her home because her labs look much better compared to yesterday but she wanted to stay today so her symptoms can be stabilized.   Electronically signed by Nathan Ca MD, 02/03/23, 08:49 CST.

## 2023-02-04 ENCOUNTER — READMISSION MANAGEMENT (OUTPATIENT)
Dept: CALL CENTER | Facility: HOSPITAL | Age: 23
End: 2023-02-04
Payer: COMMERCIAL

## 2023-02-04 VITALS
SYSTOLIC BLOOD PRESSURE: 112 MMHG | TEMPERATURE: 98 F | OXYGEN SATURATION: 98 % | RESPIRATION RATE: 16 BRPM | WEIGHT: 180 LBS | DIASTOLIC BLOOD PRESSURE: 60 MMHG | BODY MASS INDEX: 27.28 KG/M2 | HEIGHT: 68 IN | HEART RATE: 78 BPM

## 2023-02-04 LAB
ANION GAP SERPL CALCULATED.3IONS-SCNC: 8 MMOL/L (ref 5–15)
BUN SERPL-MCNC: 7 MG/DL (ref 6–20)
BUN/CREAT SERPL: 17.1 (ref 7–25)
CALCIUM SPEC-SCNC: 8.9 MG/DL (ref 8.6–10.5)
CHLORIDE SERPL-SCNC: 108 MMOL/L (ref 98–107)
CO2 SERPL-SCNC: 26 MMOL/L (ref 22–29)
CREAT SERPL-MCNC: 0.41 MG/DL (ref 0.57–1)
EGFRCR SERPLBLD CKD-EPI 2021: 142.9 ML/MIN/1.73
GLUCOSE BLDC GLUCOMTR-MCNC: 177 MG/DL (ref 70–130)
GLUCOSE BLDC GLUCOMTR-MCNC: 79 MG/DL (ref 70–130)
GLUCOSE SERPL-MCNC: 92 MG/DL (ref 65–99)
POTASSIUM SERPL-SCNC: 3.6 MMOL/L (ref 3.5–5.2)
SODIUM SERPL-SCNC: 142 MMOL/L (ref 136–145)
WHOLE BLOOD HOLD SPECIMEN: NORMAL

## 2023-02-04 PROCEDURE — 80048 BASIC METABOLIC PNL TOTAL CA: CPT | Performed by: STUDENT IN AN ORGANIZED HEALTH CARE EDUCATION/TRAINING PROGRAM

## 2023-02-04 PROCEDURE — 82962 GLUCOSE BLOOD TEST: CPT

## 2023-02-04 PROCEDURE — 63710000001 INSULIN DETEMIR PER 5 UNITS: Performed by: STUDENT IN AN ORGANIZED HEALTH CARE EDUCATION/TRAINING PROGRAM

## 2023-02-04 RX ADMIN — Medication 10 ML: at 10:03

## 2023-02-04 RX ADMIN — Medication 10 ML: at 10:04

## 2023-02-04 RX ADMIN — INSULIN DETEMIR 20 UNITS: 100 INJECTION, SOLUTION SUBCUTANEOUS at 10:03

## 2023-02-04 NOTE — PLAN OF CARE
Goal Outcome Evaluation:  Plan of Care Reviewed With: patient        Progress: improving  Outcome Evaluation: pt had D/C orders 2/3/2023 and was held due to N/V. Pt has not had any N/V or GI symptoms during shift. Will continue to monitor.
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
## 2023-02-05 NOTE — OUTREACH NOTE
Prep Survey    Flowsheet Row Responses   [facility] facility patient discharged from? [place]   Is LACE score < 7 ? No   Eligibility Readm Mgmt   Discharge diagnosis DKA   Does the patient have one of the following disease processes/diagnoses(primary or secondary)? Other   Does the patient have Home health ordered? No   Is there a DME ordered? No   Prep survey completed? Yes          CHRISTOPHER MCINTOSH - Registered Nurse
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
## 2023-02-08 ENCOUNTER — READMISSION MANAGEMENT (OUTPATIENT)
Dept: CALL CENTER | Facility: HOSPITAL | Age: 23
End: 2023-02-08
Payer: COMMERCIAL

## 2023-02-08 ENCOUNTER — TELEPHONE (OUTPATIENT)
Dept: ENDOCRINOLOGY | Facility: CLINIC | Age: 23
End: 2023-02-08
Payer: COMMERCIAL

## 2023-02-08 NOTE — OUTREACH NOTE
Medical Week 1 Survey    Flowsheet Row Responses   [facility] facility patient discharged from? [place]   Does the patient have one of the following disease processes/diagnoses(primary or secondary)? Other   Week 1 attempt successful? No   Unsuccessful attempts Attempt 1          Cesilia Jimenez Registered Nurse

## 2023-02-10 ENCOUNTER — OFFICE VISIT (OUTPATIENT)
Dept: ENDOCRINOLOGY | Facility: CLINIC | Age: 23
End: 2023-02-10
Payer: COMMERCIAL

## 2023-02-10 VITALS
OXYGEN SATURATION: 97 % | HEIGHT: 68 IN | DIASTOLIC BLOOD PRESSURE: 70 MMHG | BODY MASS INDEX: 29.4 KG/M2 | HEART RATE: 99 BPM | WEIGHT: 194 LBS | SYSTOLIC BLOOD PRESSURE: 126 MMHG

## 2023-02-10 DIAGNOSIS — E10.65 TYPE 1 DIABETES MELLITUS WITH HYPERGLYCEMIA: Primary | ICD-10-CM

## 2023-02-10 DIAGNOSIS — E83.39 HYPOPHOSPHATEMIA: ICD-10-CM

## 2023-02-10 DIAGNOSIS — R30.0 DYSURIA: ICD-10-CM

## 2023-02-10 PROCEDURE — 95251 CONT GLUC MNTR ANALYSIS I&R: CPT | Performed by: INTERNAL MEDICINE

## 2023-02-10 PROCEDURE — 99214 OFFICE O/P EST MOD 30 MIN: CPT | Performed by: INTERNAL MEDICINE

## 2023-02-10 RX ORDER — INSULIN PMP CART,AUT,G6/7,CNTR
1 EACH SUBCUTANEOUS ONCE
Qty: 1 KIT | Refills: 1 | Status: SHIPPED | OUTPATIENT
Start: 2023-02-10 | End: 2023-02-10

## 2023-02-10 RX ORDER — INSULIN PMP CART,AUT,G6/7,CNTR
1 EACH SUBCUTANEOUS
Qty: 10 EACH | Refills: 11 | Status: SHIPPED | OUTPATIENT
Start: 2023-02-10

## 2023-02-10 NOTE — PROGRESS NOTES
"  Subjective    Anna García is a 22 y.o. female. she is here today for follow-up.          Diabetes  Pertinent negatives for hypoglycemia include no confusion, dizziness, headaches, pallor or tremors. Pertinent negatives for diabetes include no chest pain, no fatigue, no polydipsia, no polyphagia, no polyuria and no weakness.         IN OFFICE VISIT     History of Present Illness      Reason -  Diabetes         Duration/Timing:  Diabetes mellitus type 1, Age at onset of diabetes: 8 years     Timing - constant      Quality -  not controlled               Severity (Complications/Hospitalizations)     Secondary Macrovascular Complications:  No CAD, No CVA, No PAD  Secondary Microvascular Complications:  No Diabetic Nephropathy, No proteinuria, No Diabetic Retinopathy, No Diabetic Neuropathy     Context  Diabetes Regimen:      toujeo mukesh          Lab Results   Component Value Date    HGBA1C 11.30 (H) 02/01/2023                   Exercise:  Does not exercise     Associated Signs/Symptoms  Hyperglycemic Symptoms:  Positive  polyuria, No polydipsia, No polyphagia  Hypoglycemic Episodes:  + documented hypoglycemia  See dexcom below      Recent hospitalization for DKA since her pharmacy didn't have toujeo in stock      PE    /70   Pulse 99   Ht 172.7 cm (68\")   Wt 88 kg (194 lb)   SpO2 97%   BMI 29.50 kg/m²   AOx3  No visible goiter  Normal Respiratory Effort , Lung CTA  RRR  No Edema  Decreased hand dexterity  lipohypertophy at injection sites     Bilateral preulcerative callus    Decreased sensation to monofilament   Labs    Lab Results   Component Value Date    WBC 13.24 (H) 02/02/2023    HGB 13.5 02/02/2023    HCT 40.9 02/02/2023    MCV 89.7 02/02/2023     02/02/2023     Lab Results   Component Value Date    GLUCOSE 92 02/04/2023    BUN 7 02/04/2023    CREATININE 0.41 (L) 02/04/2023    EGFRIFNONA >150 01/03/2021    EGFRIFAFRI 244 09/11/2021    BCR 17.1 02/04/2023     02/04/2023    " K 3.6 02/04/2023    CO2 26.0 02/04/2023    CALCIUM 8.9 02/04/2023    ALBUMIN 4.2 02/01/2023    AST 22 02/01/2023    ALT 18 02/01/2023         Assessment & Plan      1. Type 1 diabetes mellitus with hyperglycemia (HCC)    2. Hypophosphatemia    3. Dysuria    .      Type 1 DM w hyperglycemia      no complications      Lab Results   Component Value Date    HGBA1C 11.30 (H) 02/01/2023        Ambulatory Glucose Profile Report    Days Analyzed : 2 week period ending on 02/22/23      Continuous Glucose Monitory Device:  Dexcom G6     - 35% very high target range  - 23% high target range  - 37% in target range  - 4% below target range  - GMI na %  - Average glucose 216 mg/dl    Interpretation : Diabetes Type 1 w hyperglycemia               Type 1 DM w hyperglycemia , report scanned in      Toujeo 56 - decrease to 45 , Mealtime 1:4 ( lyumjev ) , correction 1:30 , target 140        She has baqsimi       Approve for T slim , too expensive but omnipod affordable   1.8 u per hour , carb ratio 5, correction 30 , target 140 if she restarts pump           Lipid Management        Lab Results   Component Value Date     (H) 07/15/2022          None, young      Blood Pressure Management        nl w/o ace I        Microvascular Complication Monitoring:             No neuropathy         Preventive Care:  Patient is not smoking     Weight Related:  Not overweight, No obesity     Bone Health     low vit D           Lab Results   Component Value Date    RGSW90WO 22.8 (L) 07/15/2022    YVSO46LG 20.0 (L) 08/10/2021        vitamin d 1000 units daily    start vit D 50 th units weekly      Other Diabetes Related Aspects  Jan 2016     nl celiac      Lab Results   Component Value Date    TSH 3.110 07/15/2022             Lab Results   Component Value Date    QVHUDEPR28 492 07/15/2022             This document has been electronically signed by Mariano Sexton MD on February 22, 2023 10:39 CST

## 2023-02-14 ENCOUNTER — READMISSION MANAGEMENT (OUTPATIENT)
Dept: CALL CENTER | Facility: HOSPITAL | Age: 23
End: 2023-02-14
Payer: COMMERCIAL

## 2023-02-14 NOTE — OUTREACH NOTE
Medical Week 2 Survey    Flowsheet Row Responses   [facility] patient discharged from? [place]   Does the patient have one of the following disease processes/diagnoses(primary or secondary)? Other   Week 2 attempt successful? Yes   Call start time 1636   Discharge diagnosis DKA   Call end time 1638   Meds reviewed with patient/caregiver? Yes   Is the patient having any side effects they believe may be caused by any medication additions or changes? No   Does the patient have all medications ordered at discharge? Yes   Is the patient taking all medications as directed (includes completed medication regime)? Yes   Comments regarding appointments Appt with endo is on 2/10/23   Does the patient have a primary care provider?  Yes   Does the patient have an appointment with their PCP within 7 days of discharge? Greater than 7 days   What is preventing the patient from scheduling follow up appointments within 7 days of discharge? --  [Pt has a regular appt scheduled ]   Nursing Interventions Advised patient to make appointment   Has the patient kept scheduled appointments due by today? Yes   Psychosocial issues? No   Did the patient receive a copy of their discharge instructions? Yes   Nursing interventions Reviewed instructions with patient   What is the patient's perception of their health status since discharge? Improving   Is the patient/caregiver able to teach back signs and symptoms related to disease process for when to call PCP? Yes   Is the patient/caregiver able to teach back signs and symptoms related to disease process for when to call 911? Yes   Is the patient/caregiver able to teach back the hierarchy of who to call/visit for symptoms/problems? PCP, Specialist, Home health nurse, Urgent Care, ED, 911 Yes   If the patient is a current smoker, are they able to teach back resources for cessation? Not a smoker   Week 2 Call Completed? Yes   Is the patient interested in additional calls from an  ambulatory ?  NOTE:  applies to high risk patients requiring additional follow-up. No   Revoked No further contact(revokes)-requires comment   Graduated/Revoked comments Pt reports she's doing well. She's followed up with Dejah ALEXANDER - Registered Nurse

## 2023-03-24 ENCOUNTER — DOCUMENTATION (OUTPATIENT)
Dept: ENDOCRINOLOGY | Facility: CLINIC | Age: 23
End: 2023-03-24
Payer: COMMERCIAL

## 2023-04-20 ENCOUNTER — TELEPHONE (OUTPATIENT)
Dept: ENDOCRINOLOGY | Facility: CLINIC | Age: 23
End: 2023-04-20
Payer: COMMERCIAL

## 2023-04-28 ENCOUNTER — TELEPHONE (OUTPATIENT)
Dept: ENDOCRINOLOGY | Facility: CLINIC | Age: 23
End: 2023-04-28
Payer: COMMERCIAL

## 2023-04-28 NOTE — TELEPHONE ENCOUNTER
Pt called, stating she usually takes U-200 insulin - for at least 6 months now. However her pharmacy says they have paperwork (PA?) that states she is taking U-100. She is questioning if she is supposed to still take the U-200, or if the U-100 was a written mistake?    Please advise    Pt callback number [phone]

## 2023-05-01 RX ORDER — INSULIN LISPRO 100 [IU]/ML
INJECTION, SOLUTION INTRAVENOUS; SUBCUTANEOUS
Qty: 50 ML | Refills: 11 | Status: SHIPPED | OUTPATIENT
Start: 2023-05-01

## 2023-06-05 ENCOUNTER — LAB (OUTPATIENT)
Dept: LAB | Facility: HOSPITAL | Age: 23
End: 2023-06-05
Payer: COMMERCIAL

## 2023-06-05 ENCOUNTER — OFFICE VISIT (OUTPATIENT)
Dept: ENDOCRINOLOGY | Facility: CLINIC | Age: 23
End: 2023-06-05
Payer: COMMERCIAL

## 2023-06-05 VITALS
BODY MASS INDEX: 33.8 KG/M2 | DIASTOLIC BLOOD PRESSURE: 70 MMHG | SYSTOLIC BLOOD PRESSURE: 110 MMHG | HEART RATE: 107 BPM | HEIGHT: 68 IN | OXYGEN SATURATION: 98 % | WEIGHT: 223 LBS

## 2023-06-05 DIAGNOSIS — L84 PRE-ULCERATIVE CALLUSES: ICD-10-CM

## 2023-06-05 DIAGNOSIS — R79.89 ABNORMAL THYROID BLOOD TEST: ICD-10-CM

## 2023-06-05 DIAGNOSIS — E10.9 WELL CONTROLLED TYPE 1 DIABETES MELLITUS: Primary | ICD-10-CM

## 2023-06-05 LAB
25(OH)D3 SERPL-MCNC: 28.1 NG/ML (ref 30–100)
ALBUMIN SERPL-MCNC: 3.6 G/DL (ref 3.5–5.2)
ALBUMIN UR-MCNC: <1.2 MG/DL
ALBUMIN/GLOB SERPL: 1.1 G/DL
ALP SERPL-CCNC: 87 U/L (ref 39–117)
ALT SERPL W P-5'-P-CCNC: 13 U/L (ref 1–33)
ANION GAP SERPL CALCULATED.3IONS-SCNC: 11 MMOL/L (ref 5–15)
AST SERPL-CCNC: 15 U/L (ref 1–32)
BACTERIA UR QL AUTO: ABNORMAL /HPF
BASOPHILS # BLD AUTO: 0.07 10*3/MM3 (ref 0–0.2)
BASOPHILS NFR BLD AUTO: 1.1 % (ref 0–1.5)
BILIRUB SERPL-MCNC: 0.2 MG/DL (ref 0–1.2)
BILIRUB UR QL STRIP: NEGATIVE
BUN SERPL-MCNC: 15 MG/DL (ref 6–20)
BUN/CREAT SERPL: 27.3 (ref 7–25)
CALCIUM SPEC-SCNC: 8.8 MG/DL (ref 8.6–10.5)
CHLORIDE SERPL-SCNC: 104 MMOL/L (ref 98–107)
CHOLEST SERPL-MCNC: 176 MG/DL (ref 0–200)
CLARITY UR: ABNORMAL
CO2 SERPL-SCNC: 24 MMOL/L (ref 22–29)
COLOR UR: YELLOW
CREAT SERPL-MCNC: 0.55 MG/DL (ref 0.57–1)
CREAT UR-MCNC: 46.8 MG/DL
CRP SERPL-MCNC: 0.61 MG/DL (ref 0–0.5)
DEPRECATED RDW RBC AUTO: 42.8 FL (ref 37–54)
EGFRCR SERPLBLD CKD-EPI 2021: 132.3 ML/MIN/1.73
EOSINOPHIL # BLD AUTO: 0.09 10*3/MM3 (ref 0–0.4)
EOSINOPHIL NFR BLD AUTO: 1.4 % (ref 0.3–6.2)
ERYTHROCYTE [DISTWIDTH] IN BLOOD BY AUTOMATED COUNT: 13.1 % (ref 12.3–15.4)
ERYTHROCYTE [SEDIMENTATION RATE] IN BLOOD: 22 MM/HR (ref 0–20)
GLOBULIN UR ELPH-MCNC: 3.2 GM/DL
GLUCOSE SERPL-MCNC: 102 MG/DL (ref 65–99)
GLUCOSE UR STRIP-MCNC: NEGATIVE MG/DL
HBA1C MFR BLD: 9.1 % (ref 4.8–5.6)
HCT VFR BLD AUTO: 38.3 % (ref 34–46.6)
HDLC SERPL-MCNC: 71 MG/DL (ref 40–60)
HGB BLD-MCNC: 12.6 G/DL (ref 12–15.9)
HGB UR QL STRIP.AUTO: NEGATIVE
HYALINE CASTS UR QL AUTO: ABNORMAL /LPF
IMM GRANULOCYTES # BLD AUTO: 0.01 10*3/MM3 (ref 0–0.05)
IMM GRANULOCYTES NFR BLD AUTO: 0.2 % (ref 0–0.5)
KETONES UR QL STRIP: NEGATIVE
LDLC SERPL CALC-MCNC: 94 MG/DL (ref 0–100)
LDLC/HDLC SERPL: 1.32 {RATIO}
LEUKOCYTE ESTERASE UR QL STRIP.AUTO: ABNORMAL
LYMPHOCYTES # BLD AUTO: 1.76 10*3/MM3 (ref 0.7–3.1)
LYMPHOCYTES NFR BLD AUTO: 28.1 % (ref 19.6–45.3)
MAGNESIUM SERPL-MCNC: 1.7 MG/DL (ref 1.6–2.6)
MCH RBC QN AUTO: 29.8 PG (ref 26.6–33)
MCHC RBC AUTO-ENTMCNC: 32.9 G/DL (ref 31.5–35.7)
MCV RBC AUTO: 90.5 FL (ref 79–97)
MICROALBUMIN/CREAT UR: NORMAL MG/G{CREAT}
MONOCYTES # BLD AUTO: 0.5 10*3/MM3 (ref 0.1–0.9)
MONOCYTES NFR BLD AUTO: 8 % (ref 5–12)
NEUTROPHILS NFR BLD AUTO: 3.83 10*3/MM3 (ref 1.7–7)
NEUTROPHILS NFR BLD AUTO: 61.2 % (ref 42.7–76)
NITRITE UR QL STRIP: NEGATIVE
NRBC BLD AUTO-RTO: 0 /100 WBC (ref 0–0.2)
PH UR STRIP.AUTO: 5.5 [PH] (ref 5–9)
PHOSPHATE SERPL-MCNC: 3.6 MG/DL (ref 2.5–4.5)
PLATELET # BLD AUTO: 346 10*3/MM3 (ref 140–450)
PMV BLD AUTO: 9.7 FL (ref 6–12)
POTASSIUM SERPL-SCNC: 3.9 MMOL/L (ref 3.5–5.2)
PROT SERPL-MCNC: 6.8 G/DL (ref 6–8.5)
PROT UR QL STRIP: NEGATIVE
RBC # BLD AUTO: 4.23 10*6/MM3 (ref 3.77–5.28)
RBC # UR STRIP: ABNORMAL /HPF
REF LAB TEST METHOD: ABNORMAL
SODIUM SERPL-SCNC: 139 MMOL/L (ref 136–145)
SP GR UR STRIP: 1.01 (ref 1–1.03)
SQUAMOUS #/AREA URNS HPF: ABNORMAL /HPF
TRIGL SERPL-MCNC: 58 MG/DL (ref 0–150)
TSH SERPL DL<=0.05 MIU/L-ACNC: 2.08 UIU/ML (ref 0.27–4.2)
UROBILINOGEN UR QL STRIP: ABNORMAL
VIT B12 BLD-MCNC: 255 PG/ML (ref 211–946)
VLDLC SERPL-MCNC: 11 MG/DL (ref 5–40)
WBC # UR STRIP: ABNORMAL /HPF
WBC NRBC COR # BLD: 6.26 10*3/MM3 (ref 3.4–10.8)

## 2023-06-05 PROCEDURE — 83036 HEMOGLOBIN GLYCOSYLATED A1C: CPT | Performed by: INTERNAL MEDICINE

## 2023-06-05 PROCEDURE — 82570 ASSAY OF URINE CREATININE: CPT | Performed by: INTERNAL MEDICINE

## 2023-06-05 PROCEDURE — 80061 LIPID PANEL: CPT | Performed by: INTERNAL MEDICINE

## 2023-06-05 PROCEDURE — 81001 URINALYSIS AUTO W/SCOPE: CPT | Performed by: INTERNAL MEDICINE

## 2023-06-05 PROCEDURE — 84100 ASSAY OF PHOSPHORUS: CPT | Performed by: INTERNAL MEDICINE

## 2023-06-05 PROCEDURE — 85652 RBC SED RATE AUTOMATED: CPT | Performed by: INTERNAL MEDICINE

## 2023-06-05 PROCEDURE — 87086 URINE CULTURE/COLONY COUNT: CPT | Performed by: INTERNAL MEDICINE

## 2023-06-05 PROCEDURE — 86140 C-REACTIVE PROTEIN: CPT | Performed by: INTERNAL MEDICINE

## 2023-06-05 PROCEDURE — 82043 UR ALBUMIN QUANTITATIVE: CPT | Performed by: INTERNAL MEDICINE

## 2023-06-05 PROCEDURE — 82607 VITAMIN B-12: CPT | Performed by: INTERNAL MEDICINE

## 2023-06-05 PROCEDURE — 80053 COMPREHEN METABOLIC PANEL: CPT | Performed by: INTERNAL MEDICINE

## 2023-06-05 PROCEDURE — 83735 ASSAY OF MAGNESIUM: CPT | Performed by: INTERNAL MEDICINE

## 2023-06-05 PROCEDURE — 85025 COMPLETE CBC W/AUTO DIFF WBC: CPT | Performed by: INTERNAL MEDICINE

## 2023-06-05 PROCEDURE — 82306 VITAMIN D 25 HYDROXY: CPT | Performed by: INTERNAL MEDICINE

## 2023-06-05 PROCEDURE — 84443 ASSAY THYROID STIM HORMONE: CPT | Performed by: INTERNAL MEDICINE

## 2023-06-05 NOTE — PROGRESS NOTES
"  Subjective    Anna García is a 23 y.o. female. she is here today for follow-up.          Diabetes  Pertinent negatives for hypoglycemia include no confusion, dizziness, headaches, pallor or tremors. Pertinent negatives for diabetes include no chest pain, no fatigue, no polydipsia, no polyphagia, no polyuria and no weakness.       IN OFFICE VISIT     History of Present Illness      Reason -  Diabetes         Duration/Timing:  Diabetes mellitus type 1, Age at onset of diabetes: 8 years     Timing - constant      Quality -  much better controlled               Severity (Complications/Hospitalizations)     Secondary Macrovascular Complications:  No CAD, No CVA, No PAD  Secondary Microvascular Complications:  No Diabetic Nephropathy, No proteinuria, No Diabetic Retinopathy, No Diabetic Neuropathy     Context  Diabetes Regimen:       Tandem           Lab Results   Component Value Date    HGBA1C 11.30 (H) 02/01/2023                   Exercise:  Does not exercise     Associated Signs/Symptoms  None w          PE    /70   Pulse 107   Ht 172.7 cm (68\")   Wt 101 kg (223 lb)   SpO2 98%   BMI 33.91 kg/m²   AOx3  No visible goiter  Normal Respiratory Effort , Lung CTA  RRR  No Edema  Decreased hand dexterity  lipohypertophy at injection sites     Bilateral preulcerative callus    Decreased sensation to monofilament   Labs    Lab Results   Component Value Date    WBC 13.24 (H) 02/02/2023    HGB 13.5 02/02/2023    HCT 40.9 02/02/2023    MCV 89.7 02/02/2023     02/02/2023     Lab Results   Component Value Date    GLUCOSE 92 02/04/2023    BUN 7 02/04/2023    CREATININE 0.41 (L) 02/04/2023    EGFRIFNONA >150 01/03/2021    EGFRIFAFRI 244 09/11/2021    BCR 17.1 02/04/2023     02/04/2023    K 3.6 02/04/2023    CO2 26.0 02/04/2023    CALCIUM 8.9 02/04/2023    ALBUMIN 4.2 02/01/2023    AST 22 02/01/2023    ALT 18 02/01/2023         Assessment & Plan      1. Type 1 diabetes mellitus with hyperglycemia  "   .      Type 1 DM w hyperglycemia      no complications      Lab Results   Component Value Date    HGBA1C 11.30 (H) 02/01/2023        Ambulatory Glucose Profile Report    Days Analyzed : 2 week period ending on 06/05/23      Continuous Glucose Monitory Device:  Dexcom G6     Above target 33  In target 64   Below target 3%    Interpretation : Diabetes Type 1 well controlled             T slim ,   1.8 u per hour , carb ratio 5, correction 30 , target 140 if she restarts pump     Changes   11 pm to 6 am 1.8 to 1.5     Change correction to 35    Change carb ratio to 6 from 11 pm to 6 am           Lipid Management        Lab Results   Component Value Date     (H) 07/15/2022          None, young      Blood Pressure Management        nl w/o ace I        Microvascular Complication Monitoring:             No neuropathy         Preventive Care:  Patient is not smoking     Weight Related:  Not overweight, No obesity     Bone Health     low vit D           Lab Results   Component Value Date    NPDL85TF 22.8 (L) 07/15/2022    ASVY87HD 20.0 (L) 08/10/2021        vitamin d 1000 units daily    start vit D 50 th units weekly      Other Diabetes Related Aspects  Jan 2016     nl celiac      Lab Results   Component Value Date    TSH 3.110 07/15/2022             Lab Results   Component Value Date    LKGKRGRO21 492 07/15/2022             This document has been electronically signed by Mariano Sexton MD on June 5, 2023 08:35 CDT

## 2023-06-06 DIAGNOSIS — R79.89 ABNORMAL THYROID BLOOD TEST: ICD-10-CM

## 2023-06-06 DIAGNOSIS — E10.9 WELL CONTROLLED TYPE 1 DIABETES MELLITUS: Primary | ICD-10-CM

## 2023-06-06 LAB — BACTERIA SPEC AEROBE CULT: NORMAL

## 2023-08-01 DIAGNOSIS — E10.9 WELL CONTROLLED TYPE 1 DIABETES MELLITUS: Primary | ICD-10-CM

## 2023-08-01 RX ORDER — PROCHLORPERAZINE 25 MG/1
SUPPOSITORY RECTAL
Qty: 9 EACH | Refills: 3 | Status: SHIPPED | OUTPATIENT
Start: 2023-08-01

## 2023-08-15 RX ORDER — PROCHLORPERAZINE 25 MG/1
1 SUPPOSITORY RECTAL DAILY
Qty: 1 EACH | Refills: 3 | Status: SHIPPED | OUTPATIENT
Start: 2023-08-15

## 2024-10-25 NOTE — TELEPHONE ENCOUNTER
INS WANTS TOUJEO SENT OVER TO CHANGE.     AFREZZA  PA. SENT INFO OVER TO [facility]  
Detail Level: Zone
Detail Level: Detailed
Detail Level: Generalized
Detail Level: Simple